# Patient Record
Sex: FEMALE | Race: BLACK OR AFRICAN AMERICAN | NOT HISPANIC OR LATINO | Employment: FULL TIME | URBAN - METROPOLITAN AREA
[De-identification: names, ages, dates, MRNs, and addresses within clinical notes are randomized per-mention and may not be internally consistent; named-entity substitution may affect disease eponyms.]

---

## 2017-04-14 ENCOUNTER — OFFICE VISIT (OUTPATIENT)
Dept: FAMILY MEDICINE | Facility: CLINIC | Age: 42
End: 2017-04-14
Payer: COMMERCIAL

## 2017-04-14 VITALS
TEMPERATURE: 98.3 F | OXYGEN SATURATION: 100 % | HEART RATE: 69 BPM | BODY MASS INDEX: 30.99 KG/M2 | SYSTOLIC BLOOD PRESSURE: 125 MMHG | DIASTOLIC BLOOD PRESSURE: 75 MMHG | WEIGHT: 186 LBS | HEIGHT: 65 IN

## 2017-04-14 DIAGNOSIS — Z11.3 SCREEN FOR STD (SEXUALLY TRANSMITTED DISEASE): ICD-10-CM

## 2017-04-14 DIAGNOSIS — Z00.00 ROUTINE GENERAL MEDICAL EXAMINATION AT A HEALTH CARE FACILITY: Primary | ICD-10-CM

## 2017-04-14 LAB
CHOLEST SERPL-MCNC: 177 MG/DL
GLUCOSE SERPL-MCNC: 101 MG/DL (ref 70–99)
HDLC SERPL-MCNC: 56 MG/DL
LDLC SERPL CALC-MCNC: 96 MG/DL
NONHDLC SERPL-MCNC: 121 MG/DL
TRIGL SERPL-MCNC: 125 MG/DL

## 2017-04-14 PROCEDURE — 86803 HEPATITIS C AB TEST: CPT | Performed by: PHYSICIAN ASSISTANT

## 2017-04-14 PROCEDURE — 87389 HIV-1 AG W/HIV-1&-2 AB AG IA: CPT | Performed by: PHYSICIAN ASSISTANT

## 2017-04-14 PROCEDURE — 99395 PREV VISIT EST AGE 18-39: CPT | Performed by: PHYSICIAN ASSISTANT

## 2017-04-14 PROCEDURE — 86709 HEPATITIS A IGM ANTIBODY: CPT | Performed by: PHYSICIAN ASSISTANT

## 2017-04-14 PROCEDURE — 82947 ASSAY GLUCOSE BLOOD QUANT: CPT | Performed by: PHYSICIAN ASSISTANT

## 2017-04-14 PROCEDURE — 36415 COLL VENOUS BLD VENIPUNCTURE: CPT | Performed by: PHYSICIAN ASSISTANT

## 2017-04-14 PROCEDURE — 87340 HEPATITIS B SURFACE AG IA: CPT | Performed by: PHYSICIAN ASSISTANT

## 2017-04-14 PROCEDURE — 86780 TREPONEMA PALLIDUM: CPT | Performed by: PHYSICIAN ASSISTANT

## 2017-04-14 PROCEDURE — 80061 LIPID PANEL: CPT | Performed by: PHYSICIAN ASSISTANT

## 2017-04-14 NOTE — NURSING NOTE
"Chief Complaint   Patient presents with     Physical     fasting       Initial /75 (BP Location: Left arm, Patient Position: Chair, Cuff Size: Adult Regular)  Pulse 69  Temp 98.3  F (36.8  C) (Oral)  Ht 5' 5\" (1.651 m)  Wt 186 lb (84.4 kg)  SpO2 100%  BMI 30.95 kg/m2 Estimated body mass index is 30.95 kg/(m^2) as calculated from the following:    Height as of this encounter: 5' 5\" (1.651 m).    Weight as of this encounter: 186 lb (84.4 kg).  Medication Reconciliation: complete       Rajni Sánchez MA  8:24 AM 4/14/2017    "

## 2017-04-14 NOTE — PATIENT INSTRUCTIONS
Preventive Health Recommendations  Female Ages 26 - 39  Yearly exam:   See your health care provider every year in order to    Review health changes.     Discuss preventive care.      Review your medicines if you your doctor has prescribed any.    Until age 30: Get a Pap test every three years (more often if you have had an abnormal result).    After age 30: Talk to your doctor about whether you should have a Pap test every 3 years or have a Pap test with HPV screening every 5 years.   You do not need a Pap test if your uterus was removed (hysterectomy) and you have not had cancer.  You should be tested each year for STDs (sexually transmitted diseases), if you're at risk.   Talk to your provider about how often to have your cholesterol checked.  If you are at risk for diabetes, you should have a diabetes test (fasting glucose).  Shots: Get a flu shot each year. Get a tetanus shot every 10 years.   Nutrition:     Eat at least 5 servings of fruits and vegetables each day.    Eat whole-grain bread, whole-wheat pasta and brown rice instead of white grains and rice.    Talk to your provider about Calcium and Vitamin D.     Lifestyle    Exercise at least 150 minutes a week (30 minutes a day, 5 days of the week). This will help you control your weight and prevent disease.    Limit alcohol to one drink per day.    No smoking.     Wear sunscreen to prevent skin cancer.    See your dentist every six months for an exam and cleaning.        Based on your medical history and these are the current health maintenance or preventive care services that you are due for (some may have been done at this visit)  Health Maintenance Due   Topic Date Due     PAP SCREENING Q3 YR (SYSTEM ASSIGNED)  07/22/1998         At [facility], we strive to deliver an exceptional experience to you, every time we see you.    If you receive a survey in the mail, please send us back your thoughts. We really do value your  feedback.    Your care team's suggested websites for health information:  Www.fairview.org : Up to date and easily searchable information on multiple topics.  Www.medlineplus.gov : medication info, interactive tutorials, watch real surgeries online  Www.familydoctor.org : good info from the Academy of Family Physicians  Www.cdc.gov : public health info, travel advisories, epidemics (H1N1)  Www.aap.org : children's health info, normal development, vaccinations  Www.health.[facility].[place].us : [place] dept of health, public health issues in [place], N1N1    How to contact your care team:   Team Ryann/Spirit [phone]         Pharmacy [phone]    Dr. Flowers, Tonie Bill PA-C, Dr. Noguera, Aditi Garcia APRN CNP, Adamaris Carrion PA-C, Dr. Grimes, and STEPHANIA Casillas CNP    Team RNs: Mariah & Desire      Clinic hours  M-Th 7 am-7 pm   Fri 7 am-5 pm.   Urgent care M-F 11 am-9 pm,   Sat/Sun 9 am-5 pm.  Pharmacy M-Th 8 am-8 pm Fri 8 am-6 pm  Sat/Sun 9 am-5 pm.     All password changes, disabled accounts, or ID changes in Swipely/MyHealth will be done by our Access Services Department.    If you need help with your account or password, call: [phone]. Clinic staff no longer has the ability to change passwords.

## 2017-04-14 NOTE — PROGRESS NOTES
SUBJECTIVE:     CC: Julianna Dickey is an 41 year old woman who presents for preventive health visit.     Healthy Habits:    Do you get at least three servings of calcium containing foods daily (dairy, green leafy vegetables, etc.)? no, taking calcium and/or vitamin D supplement: yes - when remembered    Amount of exercise or daily activities, outside of work: 0 day(s) per week    Problems taking medications regularly not applicable    Medication side effects: No    Have you had an eye exam in the past two years? no    Do you see a dentist twice per year? yes    Do you have sleep apnea, excessive snoring or daytime drowsiness?no    Pap smear done on this date: 2015 (approximately), by this group: [facility], results were normal.     Was on birth control pills off and on in the past.        She continues to have right shoulder pain (which has been treated at an outside clinic, she had an MRI and has been doing physical therapy and a cortisone shot).     Today's PHQ-2 Score:   PHQ-2 ( 1999 Pfizer) 4/14/2017   Q1: Little interest or pleasure in doing things 1   Q2: Feeling down, depressed or hopeless 0   PHQ-2 Score 1       Abuse: Current or Past(Physical, Sexual or Emotional)- No  Do you feel safe in your environment - Yes    Social History   Substance Use Topics     Smoking status: Never Smoker     Smokeless tobacco: Not on file     Alcohol use Yes      Comment: social, occasionally     The patient does not drink >3 drinks per day nor >7 drinks per week.    No results for input(s): CHOL, HDL, LDL, TRIG, CHOLHDLRATIO, NHDL in the last 88034 hours.    Reviewed orders with patient.  Reviewed health maintenance and updated orders accordingly - Yes    Mammo Decision Support:  Patient under age 50, mutual decision reflected in health maintenance.      Pertinent mammograms are reviewed under the imaging tab.  History of abnormal Pap smear: NO - age 30- 65 PAP every 3 years recommended    Reviewed and updated as needed this  "visit by clinical staff  Tobacco  Allergies         Reviewed and updated as needed this visit by Provider            ROS:  C: NEGATIVE for fever, chills, change in weight  I: NEGATIVE for worrisome rashes, moles or lesions  E: NEGATIVE for vision changes or irritation  ENT: NEGATIVE for ear, mouth and throat problems  R: NEGATIVE for significant cough or SOB  B: NEGATIVE for masses, tenderness or discharge  CV: NEGATIVE for chest pain, palpitations or peripheral edema  GI: NEGATIVE for nausea, abdominal pain, heartburn, or change in bowel habits  : NEGATIVE for unusual urinary or vaginal symptoms. Periods are regular.  M: NEGATIVE for significant arthralgias or myalgia  N: NEGATIVE for weakness, dizziness or paresthesias  P: NEGATIVE for changes in mood or affect    Problem list, Medication list, Allergies, and Medical/Social/Surgical histories reviewed in [facility] and updated as appropriate.  Labs reviewed in EPIC  BP Readings from Last 3 Encounters:   04/14/17 125/75   09/18/15 123/79    Wt Readings from Last 3 Encounters:   04/14/17 186 lb (84.4 kg)   09/18/15 176 lb (79.8 kg)                  OBJECTIVE:     /75 (BP Location: Left arm, Patient Position: Chair, Cuff Size: Adult Regular)  Pulse 69  Temp 98.3  F (36.8  C) (Oral)  Ht 5' 5\" (1.651 m)  Wt 186 lb (84.4 kg)  SpO2 100%  BMI 30.95 kg/m2  EXAM:  GENERAL: healthy, alert and no distress  EYES: Eyes grossly normal to inspection, PERRL and conjunctivae and sclerae normal  HENT: ear canals and TM's normal, nose and mouth without ulcers or lesions  NECK: no adenopathy, no asymmetry, masses, or scars and thyroid normal to palpation  RESP: lungs clear to auscultation - no rales, rhonchi or wheezes  BREAST: normal without masses, tenderness or nipple discharge and no palpable axillary masses or adenopathy  CV: regular rate and rhythm, normal S1 S2, no S3 or S4, no murmur, click or rub, no peripheral edema and peripheral pulses strong  ABDOMEN: soft, " "nontender, no hepatosplenomegaly, no masses and bowel sounds normal  MS: no gross musculoskeletal defects noted, no edema  SKIN: no suspicious lesions or rashes  NEURO: Normal strength and tone, mentation intact and speech normal  PSYCH: mentation appears normal, affect normal/bright    ASSESSMENT/PLAN:     1. Routine general medical examination at a health care facility       HEALTH CARE MAINTENANCE              Reviewed USPTF recommendations and anticipatory guidance.              See orders.   I discussed in detail with Julianna Dickey the importance of cervical cancer screenings through pap smears, will repeat pap every 3 year(s).         F/u with physician she has seen previously for her shoulder pain for next steps of treatment.     - Lipid panel reflex to direct LDL  - Glucose    2. Screen for STD (sexually transmitted disease)  Patient requests screening, no symptoms.   - Anti Treponema  - NEISSERIA GONORRHOEA PCR; Future  - CHLAMYDIA TRACHOMATIS PCR; Future  - Hepatitis A antibody IgM  - Hepatitis B surface antigen  - Hepatitis C antibody  - HIV Antigen Antibody Combo    COUNSELING:   Reviewed preventive health counseling, as reflected in patient instructions       Regular exercise       Healthy diet/nutrition       Safe sex practices/STD prevention         reports that she has never smoked. She does not have any smokeless tobacco history on file.    Estimated body mass index is 30.95 kg/(m^2) as calculated from the following:    Height as of this encounter: 5' 5\" (1.651 m).    Weight as of this encounter: 186 lb (84.4 kg).   Weight management plan: Discussed healthy diet and exercise guidelines and patient will follow up in 12 months in clinic to re-evaluate.    Counseling Resources:  ATP IV Guidelines  Pooled Cohorts Equation Calculator  Breast Cancer Risk Calculator  FRAX Risk Assessment  ICSI Preventive Guidelines  Dietary Guidelines for Americans, 2010  USDA's MyPlate  ASA Prophylaxis  Lung CA " Screening    Adamaris Carrion PA-C  [facility]

## 2017-04-14 NOTE — MR AVS SNAPSHOT
After Visit Summary   4/14/2017    Julianna Dickey    MRN: [id number]           Patient Information     Date Of Birth          1975        Visit Information        Provider Department      4/14/2017 8:00 AM Adamaris Carrion PA-C [facility]        Today's Diagnoses     Routine general medical examination at a health care facility    -  1    Screen for STD (sexually transmitted disease)          Care Instructions      Preventive Health Recommendations  Female Ages 26 - 39  Yearly exam:   See your health care provider every year in order to    Review health changes.     Discuss preventive care.      Review your medicines if you your doctor has prescribed any.    Until age 30: Get a Pap test every three years (more often if you have had an abnormal result).    After age 30: Talk to your doctor about whether you should have a Pap test every 3 years or have a Pap test with HPV screening every 5 years.   You do not need a Pap test if your uterus was removed (hysterectomy) and you have not had cancer.  You should be tested each year for STDs (sexually transmitted diseases), if you're at risk.   Talk to your provider about how often to have your cholesterol checked.  If you are at risk for diabetes, you should have a diabetes test (fasting glucose).  Shots: Get a flu shot each year. Get a tetanus shot every 10 years.   Nutrition:     Eat at least 5 servings of fruits and vegetables each day.    Eat whole-grain bread, whole-wheat pasta and brown rice instead of white grains and rice.    Talk to your provider about Calcium and Vitamin D.     Lifestyle    Exercise at least 150 minutes a week (30 minutes a day, 5 days of the week). This will help you control your weight and prevent disease.    Limit alcohol to one drink per day.    No smoking.     Wear sunscreen to prevent skin cancer.    See your dentist every six months for an exam and cleaning.        Based on your medical history and these  are the current health maintenance or preventive care services that you are due for (some may have been done at this visit)  Health Maintenance Due   Topic Date Due     PAP SCREENING Q3 YR (SYSTEM ASSIGNED)  07/22/1998         At [facility], we strive to deliver an exceptional experience to you, every time we see you.    If you receive a survey in the mail, please send us back your thoughts. We really do value your feedback.    Your care team's suggested websites for health information:  Www.Anexon.org : Up to date and easily searchable information on multiple topics.  Www.medlineplus.gov : medication info, interactive tutorials, watch real surgeries online  Www.familydoctor.org : good info from the Academy of Family Physicians  Www.cdc.gov : public health info, travel advisories, epidemics (H1N1)  Www.aap.org : children's health info, normal development, vaccinations  Www.health.[facility].[place].us : [place] dept of health, public health issues in [place], N1N1    How to contact your care team:   Team Ryann/Spirit [phone]         Pharmacy [phone]    Dr. Flowers, Tonie Bill PA-C, Dr. Noguera, Aditi CAT CNP, Adamaris Carrion PA-C, Dr. Grimes, and STEPHANIA Casillas CNP    Team RNs: Mariah & Desire      Clinic hours  M-Th 7 am-7 pm   Fri 7 am-5 pm.   Urgent care M-F 11 am-9 pm,   Sat/Sun 9 am-5 pm.  Pharmacy M-Th 8 am-8 pm Fri 8 am-6 pm  Sat/Sun 9 am-5 pm.     All password changes, disabled accounts, or ID changes in Flavourly/MyHealth will be done by our Access Services Department.    If you need help with your account or password, call: [phone]. Clinic staff no longer has the ability to change passwords.           Follow-ups after your visit        Your next 10 appointments already scheduled     Apr 19, 2017  3:30 PM CDT   [facility]    [address] [place] [address]  "[phone]   [phone]           Bring a current list of meds and any records pertaining to this visit.  For Physicals, please bring immunization records and any forms needing to be filled out.  Please arrive 10 minutes early to complete paperwork.              Future tests that were ordered for you today     Open Future Orders        Priority Expected Expires Ordered    NEISSERIA GONORRHOEA PCR Routine 2017    CHLAMYDIA TRACHOMATIS PCR Routine 2017            Who to contact     If you have questions or need follow up information about today's clinic visit or your schedule please contact [facility] directly at [phone].  Normal or non-critical lab and imaging results will be communicated to you by Unowhyhart, letter or phone within 4 business days after the clinic has received the results. If you do not hear from us within 7 days, please contact the clinic through Unowhyhart or phone. If you have a critical or abnormal lab result, we will notify you by phone as soon as possible.  Submit refill requests through [facility] or call your pharmacy and they will forward the refill request to us. Please allow 3 business days for your refill to be completed.          Additional Information About Your Visit        Unowhyhart Information     [facility] lets you send messages to your doctor, view your test results, renew your prescriptions, schedule appointments and more. To sign up, go to [email or web address] . Click on \"Log in\" on the left side of the screen, which will take you to the Welcome page. Then click on \"Sign up Now\" on the right side of the page.     You will be asked to enter the access code listed below, as well as some personal information. Please follow the directions to create your username and password.     Your access code is: [email or web address]  Expires: 2017  8:54 AM     Your access code will  in 90 days. If you need help or a new code, " "please call your [facility] clinic or [phone].        Care EveryWhere ID     This is your Care EveryWhere ID. This could be used by other organizations to access your [facility] medical records  [id number]        Your Vitals Were     Pulse Temperature Height Pulse Oximetry BMI (Body Mass Index)       69 98.3  F (36.8  C) (Oral) 5' 5\" (1.651 m) 100% 30.95 kg/m2        Blood Pressure from Last 3 Encounters:   04/14/17 125/75   09/18/15 123/79    Weight from Last 3 Encounters:   04/14/17 186 lb (84.4 kg)   09/18/15 176 lb (79.8 kg)              We Performed the Following     Anti Treponema     Glucose     Hepatitis A antibody IgM     Hepatitis B surface antigen     Hepatitis C antibody     HIV Antigen Antibody Combo     Lipid panel reflex to direct LDL        Primary Care Provider    None Specified       No primary provider on file.        Thank you!     Thank you for choosing [facility]  for your care. Our goal is always to provide you with excellent care. Hearing back from our patients is one way we can continue to improve our services. Please take a few minutes to complete the written survey that you may receive in the mail after your visit with us. Thank you!             Your Updated Medication List - Protect others around you: Learn how to safely use, store and throw away your medicines at www.disposemymeds.org.      Notice  As of 4/14/2017  8:54 AM    You have not been prescribed any medications.      "

## 2017-04-14 NOTE — Clinical Note
Please abstract the following data from this visit with this patient into the appropriate field in Epic:  Pap smear done on this date: 10/30/2015 (approximately), by this group: [facility], results were normal.

## 2017-04-15 LAB — T PALLIDUM IGG+IGM SER QL: NEGATIVE

## 2017-04-17 LAB
HAV IGM SERPL QL IA: NORMAL
HBV SURFACE AG SERPL QL IA: NONREACTIVE
HCV AB SERPL QL IA: NORMAL
HIV 1+2 AB+HIV1 P24 AG SERPL QL IA: NORMAL

## 2017-04-19 ENCOUNTER — OFFICE VISIT (OUTPATIENT)
Dept: OBGYN | Facility: CLINIC | Age: 42
End: 2017-04-19
Payer: COMMERCIAL

## 2017-04-19 VITALS
SYSTOLIC BLOOD PRESSURE: 132 MMHG | WEIGHT: 187 LBS | BODY MASS INDEX: 31.16 KG/M2 | HEART RATE: 81 BPM | HEIGHT: 65 IN | DIASTOLIC BLOOD PRESSURE: 80 MMHG

## 2017-04-19 DIAGNOSIS — R53.83 LOW ENERGY: ICD-10-CM

## 2017-04-19 DIAGNOSIS — N92.0 EXCESSIVE AND FREQUENT MENSTRUATION: ICD-10-CM

## 2017-04-19 DIAGNOSIS — D25.9 UTERINE LEIOMYOMA, UNSPECIFIED LOCATION: Primary | ICD-10-CM

## 2017-04-19 DIAGNOSIS — N94.6 DYSMENORRHEA: ICD-10-CM

## 2017-04-19 DIAGNOSIS — Z11.3 SCREEN FOR STD (SEXUALLY TRANSMITTED DISEASE): ICD-10-CM

## 2017-04-19 DIAGNOSIS — N83.202 CYST OF LEFT OVARY: ICD-10-CM

## 2017-04-19 LAB
ERYTHROCYTE [DISTWIDTH] IN BLOOD BY AUTOMATED COUNT: 14.5 % (ref 10–15)
HBA1C MFR BLD: 5.3 % (ref 4.3–6)
HCT VFR BLD AUTO: 36.7 % (ref 35–47)
HGB BLD-MCNC: 12.8 G/DL (ref 11.7–15.7)
MCH RBC QN AUTO: 26.6 PG (ref 26.5–33)
MCHC RBC AUTO-ENTMCNC: 34.9 G/DL (ref 31.5–36.5)
MCV RBC AUTO: 76 FL (ref 78–100)
PLATELET # BLD AUTO: 270 10E9/L (ref 150–450)
RBC # BLD AUTO: 4.82 10E12/L (ref 3.8–5.2)
TSH SERPL DL<=0.05 MIU/L-ACNC: 2.27 MU/L (ref 0.4–4)
WBC # BLD AUTO: 5.7 10E9/L (ref 4–11)

## 2017-04-19 PROCEDURE — 85027 COMPLETE CBC AUTOMATED: CPT | Performed by: OBSTETRICS & GYNECOLOGY

## 2017-04-19 PROCEDURE — 84443 ASSAY THYROID STIM HORMONE: CPT | Performed by: OBSTETRICS & GYNECOLOGY

## 2017-04-19 PROCEDURE — 87491 CHLMYD TRACH DNA AMP PROBE: CPT | Performed by: OBSTETRICS & GYNECOLOGY

## 2017-04-19 PROCEDURE — 99203 OFFICE O/P NEW LOW 30 MIN: CPT | Performed by: OBSTETRICS & GYNECOLOGY

## 2017-04-19 PROCEDURE — 87591 N.GONORRHOEAE DNA AMP PROB: CPT | Performed by: OBSTETRICS & GYNECOLOGY

## 2017-04-19 PROCEDURE — 36415 COLL VENOUS BLD VENIPUNCTURE: CPT | Performed by: OBSTETRICS & GYNECOLOGY

## 2017-04-19 PROCEDURE — 83036 HEMOGLOBIN GLYCOSYLATED A1C: CPT | Performed by: OBSTETRICS & GYNECOLOGY

## 2017-04-19 NOTE — MR AVS SNAPSHOT
After Visit Summary   4/19/2017    Julianna Dickey    MRN: [id number]           Patient Information     Date Of Birth          1975        Visit Information        Provider Department      4/19/2017 3:30 PM Gigi Poole MD [facility]        Care Instructions                                                        If you have any questions regarding your visit, Please contact your care team.     [facility] Access Services: [phone]    [facility] HOURS TELEPHONE NUMBER   Gigi Poole M.D.      Griselda-    Giuliano Stafford-ALISHA Camp-Medical Assistant   Monday-[place]  8:00a.m-4:45 p.m  Wednesday-[place] 8:00a.m-4:45 p.m.  Thursday-[place]  8:00a.m-4:45 p.m.  Friday-[place]  8:00a.m-4:45 p.m. [facility]  [address] [address] [place] [address] [place] [address]  [phone] ask [facility]  [phone] Fax  Imaging [phone]    [facility]  [address] [place] [address] [address].  [place], [place] [address] [phone]    [facility]  [address] [place] [address]  [phone] [facility]  [phone] Fax  Imaging [phone]     Urgent Care locations:    [place]        [place] Monday-Friday  5 pm - 9 pm  Saturday and Sunday   9 am - 5 pm    Monday-Friday   11 am - 9 pm  Saturday and Sunday   9 am - 5 pm   [phone]       If you need a medication refill, please contact your pharmacy. Please allow 3 business days for your refill to be completed.  As always, Thank you for trusting us with your healthcare needs!          Follow-ups after your visit        Who to contact     If you have questions or need follow up information about today's clinic visit or your schedule please contact [facility] directly at [phone].  Normal or non-critical lab and imaging results will be communicated to you by [employer]hart,  "letter or phone within 4 business days after the clinic has received the results. If you do not hear from us within 7 days, please contact the clinic through RestoMesto or phone. If you have a critical or abnormal lab result, we will notify you by phone as soon as possible.  Submit refill requests through [employer] or call your pharmacy and they will forward the refill request to us. Please allow 3 business days for your refill to be completed.          Additional Information About Your Visit        [facility] Information     [employer] gives you secure access to your electronic health record. If you see a primary care provider, you can also send messages to your care team and make appointments. If you have questions, please call your primary care clinic.  If you do not have a primary care provider, please call [phone] and they will assist you.        Care EveryWhere ID     This is your Care EveryWhere ID. This could be used by other organizations to access your [facility] medical records  [id number]        Your Vitals Were     Pulse Height Last Period Breastfeeding? BMI (Body Mass Index)       81 5' 5\" (1.651 m) 04/14/2017 No 31.12 kg/m2        Blood Pressure from Last 3 Encounters:   04/19/17 132/80   04/14/17 125/75   09/18/15 123/79    Weight from Last 3 Encounters:   04/19/17 187 lb (84.8 kg)   04/14/17 186 lb (84.4 kg)   09/18/15 176 lb (79.8 kg)              Today, you had the following     No orders found for display       Primary Care Provider    None Specified       No primary provider on file.        Thank you!     Thank you for choosing [facility]  for your care. Our goal is always to provide you with excellent care. Hearing back from our patients is one way we can continue to improve our services. Please take a few minutes to complete the written survey that you may receive in the mail after your visit with us. Thank you!             Your Updated Medication List - Protect others around " you: Learn how to safely use, store and throw away your medicines at www.disposemymeds.org.      Notice  As of 4/19/2017  3:35 PM    You have not been prescribed any medications.

## 2017-04-19 NOTE — PATIENT INSTRUCTIONS
If you have any questions regarding your visit, Please contact your care team.     [facility] Access Services: [phone]    [facility] CLINIC HOURS TELEPHONE NUMBER   NEIL Collins-    Giuliano Stafford-ALISHA Camp-Medical Assistant   Monday-[place]  8:00a.m-4:45 p.m  Wednesday-[place] 8:00a.m-4:45 p.m.  Thursday-[place]  8:00a.m-4:45 p.m.  Friday-[place]  8:00a.m-4:45 p.m. [facility]  [address]  [place], [place] [address] [phone] ask [facility]  [phone] Fax  Imaging [phone]    [facility]  [address] [place] [address] Dr.  [place], [place] [address] [phone]    [facility]  [address] [place] [address]  [phone] ask [facility]  [phone] Fax  Imaging [phone]     Urgent Care locations:    [place]        [place] Monday-Friday  5 pm - 9 pm  Saturday and Sunday   9 am - 5 pm    Monday-Friday   11 am - 9 pm  Saturday and Sunday   9 am - 5 pm   [phone]       If you need a medication refill, please contact your pharmacy. Please allow 3 business days for your refill to be completed.  As always, Thank you for trusting us with your healthcare needs!

## 2017-04-19 NOTE — NURSING NOTE
"Chief Complaint   Patient presents with     Consult     Discuss US done at  8/19/16 Fibroids/cysts       Initial /80 (BP Location: Left arm, Patient Position: Chair, Cuff Size: Adult Regular)  Pulse 81  Ht 5' 5\" (1.651 m)  Wt 187 lb (84.8 kg)  LMP 04/14/2017  Breastfeeding? No  BMI 31.12 kg/m2 Estimated body mass index is 31.12 kg/(m^2) as calculated from the following:    Height as of this encounter: 5' 5\" (1.651 m).    Weight as of this encounter: 187 lb (84.8 kg).  Medication Reconciliation: complete   Zoë Ramirez CMA      "

## 2017-04-20 LAB
C TRACH DNA SPEC QL NAA+PROBE: NORMAL
N GONORRHOEA DNA SPEC QL NAA+PROBE: NORMAL
SPECIMEN SOURCE: NORMAL
SPECIMEN SOURCE: NORMAL

## 2017-04-23 NOTE — PROGRESS NOTES
OB-GYN Problem-Oriented Visit or Consultation      Julianna Dickey is a 41 year old year old P 2 who presents with a chief complaint of follow-up to pelvic u/s done at  08/2016.  Referred by self.  Patient's last menstrual period was 04/14/2017.    HPI:     Menses q 14-42 days x 2-14 days. Dysmenorrhea. Heavy flow. Right sided pain at ovulation. Pelvic u/s last yr reviewed from Care Everywhere: 4 cm fibroid, 2 cm complex left ov cyst. No steady partner. Plans future pregnancy. Contraceptive method is spermicidal film. Dyspareunia over past 1 yr. Also notes poor appetite and low energy. Weight gain with DepoP.  Tried Mirena and Nuvaring. Prior OC use. No VTE.     Past medical, obstetrical, surgical, family and social history reviewed and as noted or updated in chart.     Allergies, meds and supplements are as noted or updated in chart.      ROS:   Systems reviewed include:  constitutional, cardiac, respiratory, gastrointestinal, genitourinary, psychological, hematologic/lymphatic and endocrine.    These systems were negative for significant symptoms except for the following additional: none; see HPI.    EXAM:  VS as noted.   Exam not repeated at this time. Had recent gen exam.     Assessment:   Encounter Diagnoses   Name Primary?     Uterine leiomyoma, unspecified location Yes     Cyst of left ovary      Dysmenorrhea      Excessive and frequent menstruation      Screen for STD (sexually transmitted disease)      Low energy      Plan and Recommendations: I reviewed the condition, causes, differential diagnosis, prognosis, evaluation and management considerations and options.  Questions answered and information given.  See orders.  Check pelvic u/s here and then consider another OC.   Total encounter time= 30min. Direct counseling, education and care coordination time with the patient present= 20min.     A/P:  Julianna was seen today for consult.    Diagnoses and all orders for this visit:    Uterine leiomyoma, unspecified  location  -     US Pelvic Complete with Transvaginal; Future    Cyst of left ovary  -     US Pelvic Complete with Transvaginal; Future    Dysmenorrhea  -     US Pelvic Complete with Transvaginal; Future    Excessive and frequent menstruation  -     US Pelvic Complete with Transvaginal; Future    Screen for STD (sexually transmitted disease)  -     CHLAMYDIA TRACHOMATIS PCR  -     NEISSERIA GONORRHOEA PCR    Low energy  -     CBC with platelets  -     TSH  -     Hemoglobin A1c        Gigi Poole MD

## 2017-05-01 ENCOUNTER — RADIANT APPOINTMENT (OUTPATIENT)
Dept: ULTRASOUND IMAGING | Facility: CLINIC | Age: 42
End: 2017-05-01
Attending: OBSTETRICS & GYNECOLOGY
Payer: COMMERCIAL

## 2017-05-01 DIAGNOSIS — N94.6 DYSMENORRHEA: ICD-10-CM

## 2017-05-01 DIAGNOSIS — N92.0 EXCESSIVE AND FREQUENT MENSTRUATION: ICD-10-CM

## 2017-05-01 DIAGNOSIS — N83.202 CYST OF LEFT OVARY: ICD-10-CM

## 2017-05-01 DIAGNOSIS — D25.9 UTERINE LEIOMYOMA, UNSPECIFIED LOCATION: ICD-10-CM

## 2017-05-01 PROCEDURE — 76856 US EXAM PELVIC COMPLETE: CPT

## 2017-05-01 PROCEDURE — 76830 TRANSVAGINAL US NON-OB: CPT

## 2017-06-21 ENCOUNTER — OFFICE VISIT (OUTPATIENT)
Dept: OBGYN | Facility: CLINIC | Age: 42
End: 2017-06-21
Payer: COMMERCIAL

## 2017-06-21 VITALS
DIASTOLIC BLOOD PRESSURE: 76 MMHG | WEIGHT: 188 LBS | HEART RATE: 95 BPM | BODY MASS INDEX: 31.32 KG/M2 | SYSTOLIC BLOOD PRESSURE: 129 MMHG | TEMPERATURE: 98.5 F | OXYGEN SATURATION: 100 % | HEIGHT: 65 IN

## 2017-06-21 DIAGNOSIS — N92.0 EXCESSIVE OR FREQUENT MENSTRUATION: Primary | ICD-10-CM

## 2017-06-21 DIAGNOSIS — D25.2 SUBSEROUS LEIOMYOMA OF UTERUS: ICD-10-CM

## 2017-06-21 PROCEDURE — 99214 OFFICE O/P EST MOD 30 MIN: CPT | Performed by: OBSTETRICS & GYNECOLOGY

## 2017-06-21 RX ORDER — LEVONORGESTREL AND ETHINYL ESTRADIOL 0.15-0.03
1 KIT ORAL DAILY
Qty: 84 TABLET | Refills: 1 | Status: SHIPPED | OUTPATIENT
Start: 2017-06-21 | End: 2017-12-12

## 2017-06-21 ASSESSMENT — ANXIETY QUESTIONNAIRES
3. WORRYING TOO MUCH ABOUT DIFFERENT THINGS: SEVERAL DAYS
GAD7 TOTAL SCORE: 3
6. BECOMING EASILY ANNOYED OR IRRITABLE: SEVERAL DAYS
2. NOT BEING ABLE TO STOP OR CONTROL WORRYING: SEVERAL DAYS
5. BEING SO RESTLESS THAT IT IS HARD TO SIT STILL: NOT AT ALL
IF YOU CHECKED OFF ANY PROBLEMS ON THIS QUESTIONNAIRE, HOW DIFFICULT HAVE THESE PROBLEMS MADE IT FOR YOU TO DO YOUR WORK, TAKE CARE OF THINGS AT HOME, OR GET ALONG WITH OTHER PEOPLE: NOT DIFFICULT AT ALL
7. FEELING AFRAID AS IF SOMETHING AWFUL MIGHT HAPPEN: NOT AT ALL
1. FEELING NERVOUS, ANXIOUS, OR ON EDGE: NOT AT ALL

## 2017-06-21 ASSESSMENT — PATIENT HEALTH QUESTIONNAIRE - PHQ9: 5. POOR APPETITE OR OVEREATING: NOT AT ALL

## 2017-06-21 ASSESSMENT — PAIN SCALES - GENERAL: PAINLEVEL: NO PAIN (0)

## 2017-06-21 NOTE — NURSING NOTE
"Chief Complaint   Patient presents with     Consult     Fibroids & Uterine Cyst       Initial /76 (BP Location: Left arm, Patient Position: Chair, Cuff Size: Adult Large)  Pulse 95  Temp 98.5  F (36.9  C) (Oral)  Ht 5' 5\" (1.651 m)  Wt 188 lb (85.3 kg)  SpO2 100%  BMI 31.28 kg/m2 Estimated body mass index is 31.28 kg/(m^2) as calculated from the following:    Height as of this encounter: 5' 5\" (1.651 m).    Weight as of this encounter: 188 lb (85.3 kg).  Medication Reconciliation: complete   JULIO Boudreaux MA      "

## 2017-06-21 NOTE — MR AVS SNAPSHOT
"              After Visit Summary   6/21/2017    Julianna Dickey    MRN: [id number]           Patient Information     Date Of Birth          1975        Visit Information        Provider Department      6/21/2017 3:00 PM Gigi Poole MD [facility]         Follow-ups after your visit        Who to contact     If you have questions or need follow up information about today's clinic visit or your schedule please contact [facility] directly at [phone].  Normal or non-critical lab and imaging results will be communicated to you by MyChart, letter or phone within 4 business days after the clinic has received the results. If you do not hear from us within 7 days, please contact the clinic through [employer] or phone. If you have a critical or abnormal lab result, we will notify you by phone as soon as possible.  Submit refill requests through [employer] or call your pharmacy and they will forward the refill request to us. Please allow 3 business days for your refill to be completed.          Additional Information About Your Visit        MyChart Information     [employer] gives you secure access to your electronic health record. If you see a primary care provider, you can also send messages to your care team and make appointments. If you have questions, please call your primary care clinic.  If you do not have a primary care provider, please call [phone] and they will assist you.        Care EveryWhere ID     This is your Care EveryWhere ID. This could be used by other organizations to access your [place] medical records  [id number]        Your Vitals Were     Pulse Temperature Height Pulse Oximetry BMI (Body Mass Index)       95 98.5  F (36.9  C) (Oral) 5' 5\" (1.651 m) 100% 31.28 kg/m2        Blood Pressure from Last 3 Encounters:   06/21/17 129/76   04/19/17 132/80   04/14/17 125/75    Weight from Last 3 Encounters:   06/21/17 188 lb (85.3 kg)   04/19/17 187 lb (84.8 kg) "   04/14/17 186 lb (84.4 kg)              Today, you had the following     No orders found for display       Primary Care Provider    None Specified       No primary provider on file.        Equal Access to Services     GAYATRI TIMMONS : Kamilah Castro, william chappell, guillemigue zelayaharshadmelinda bryantgayatri, waxyovany yusra lindseynydia bryantkate wheatraquel cotton. So [facility] [phone].    ATENCIÓN: Si habla español, tiene a deluna disposición servicios gratuitos de asistencia lingüística. Llame al [phone].    We comply with applicable federal civil rights laws and [place] laws. We do not discriminate on the basis of race, color, national origin, age, disability sex, sexual orientation or gender identity.            Thank you!     Thank you for choosing [facility]  for your care. Our goal is always to provide you with excellent care. Hearing back from our patients is one way we can continue to improve our services. Please take a few minutes to complete the written survey that you may receive in the mail after your visit with us. Thank you!             Your Updated Medication List - Protect others around you: Learn how to safely use, store and throw away your medicines at www.disposemymeds.org.      Notice  As of 6/21/2017  3:20 PM    You have not been prescribed any medications.

## 2017-06-22 ASSESSMENT — ANXIETY QUESTIONNAIRES: GAD7 TOTAL SCORE: 3

## 2017-06-22 ASSESSMENT — PATIENT HEALTH QUESTIONNAIRE - PHQ9: SUM OF ALL RESPONSES TO PHQ QUESTIONS 1-9: 6

## 2017-06-23 NOTE — PROGRESS NOTES
Julianna Dickey is a 41 year old year old who is here today for a recheck of dysmenorrhea and uterine fibroid.  See prior notes. Pelvic u/s including images reviewed.     Significant interval changes: none.  No signif signs, symptoms or concerns otherwise. LMP was same. Fibroid now 7 cm.     Past medical, obstetrical, surgical, family and social history reviewed and as noted or updated in chart.      Exam: not repeated.    A/P: Symptomatic uterus fibroid and small ovarian cyst. Chronic pelvic pain. Dysmenorrhea.   I reviewed the condition, causes, differential diagnosis, prognosis, evaluation and management considerations and options.  Questions answered and information given.  See orders.  Advise trying a new OC to improve menses. Plans to keep uterus and keep option of future pregnancy open. Lupron or UFE not advised. Alternatives of myomectomy or Lysteda also discussed.  Nordette prescription. Recheck progress and exam in 6 mo.   Medications and prescriptions given as noted.  I reviewed side effects, risks, benefits and instructions on proper use.  Total encounter time= 25min. Direct counseling, education and care coordination time with the patient present= 20min.     Julianna was seen today for consult.    Diagnoses and all orders for this visit:    Excessive or frequent menstruation  -     levonorgestrel-ethinyl estradiol (NORDETTE) 0.15-30 MG-MCG per tablet; Take 1 tablet by mouth daily    Subserous leiomyoma of uterus  -     levonorgestrel-ethinyl estradiol (NORDETTE) 0.15-30 MG-MCG per tablet; Take 1 tablet by mouth daily        Gigi Poole MD

## 2017-09-11 ENCOUNTER — OFFICE VISIT (OUTPATIENT)
Dept: OBGYN | Facility: CLINIC | Age: 42
End: 2017-09-11
Payer: COMMERCIAL

## 2017-09-11 VITALS
SYSTOLIC BLOOD PRESSURE: 125 MMHG | HEART RATE: 81 BPM | BODY MASS INDEX: 31.28 KG/M2 | DIASTOLIC BLOOD PRESSURE: 73 MMHG | WEIGHT: 188 LBS

## 2017-09-11 DIAGNOSIS — N94.6 DYSMENORRHEA: ICD-10-CM

## 2017-09-11 DIAGNOSIS — G89.29 CHRONIC PELVIC PAIN IN FEMALE: ICD-10-CM

## 2017-09-11 DIAGNOSIS — D25.2 SUBSEROUS LEIOMYOMA OF UTERUS: Primary | ICD-10-CM

## 2017-09-11 DIAGNOSIS — R10.2 CHRONIC PELVIC PAIN IN FEMALE: ICD-10-CM

## 2017-09-11 PROCEDURE — 99214 OFFICE O/P EST MOD 30 MIN: CPT | Performed by: OBSTETRICS & GYNECOLOGY

## 2017-09-11 NOTE — NURSING NOTE
"Chief Complaint   Patient presents with     RECHECK     Follow-up fibroids       Initial /73 (BP Location: Right arm, Patient Position: Chair, Cuff Size: Adult Regular)  Pulse 81  Wt 85.3 kg (188 lb)  LMP 08/29/2017  Breastfeeding? No  BMI 31.28 kg/m2 Estimated body mass index is 31.28 kg/(m^2) as calculated from the following:    Height as of 6/21/17: 1.651 m (5' 5\").    Weight as of this encounter: 85.3 kg (188 lb).  Medication Reconciliation: complete   Zoë Ramirez CMA      "

## 2017-09-11 NOTE — MR AVS SNAPSHOT
After Visit Summary   9/11/2017    Julianna Dickey    MRN: [id number]           Patient Information     Date Of Birth          1975        Visit Information        Provider Department      9/11/2017 2:30 PM Gigi Poole MD [facility]        Care Instructions                                                        If you have any questions regarding your visit, Please contact your care team.     [facility]: [phone]    [facility] CLINIC HOURS TELEPHONE NUMBER   Gigi Poole M.D.      Griselda-    Giuliano Stafford-ALISHA Camp-Medical Assistant   Monday-[place]  8:00a.m-4:45 p.m  Wednesday-[place] 8:00a.m-4:45 p.m.  Thursday-[place]  8:00a.m-4:45 p.m.  Friday-[place]  8:00a.m-4:45 p.m. [facility]  [address] [place], [place] [address].  [place], [place] [address] [phone] ask [facility]  [phone] Fax  Imaging [phone]    [facility]  [address] [place] [address] Dr.  [place], [place] [address] [phone]    [facility]  [address] [place], [place] [address] [phone] [facility]  [phone] Fax  Imaging [phone]     Urgent Care locations:    [facility] Monday-Friday  5 pm - 9 pm  Saturday and Sunday   9 am - 5 pm    Monday-Friday   11 am - 9 pm  Saturday and Sunday   9 am - 5 pm   [phone]       If you need a medication refill, please contact your pharmacy. Please allow 3 business days for your refill to be completed.  As always, Thank you for trusting us with your healthcare needs!            Follow-ups after your visit        Who to contact     If you have questions or need follow up information about today's clinic visit or your schedule please contact [facility] directly at [phone].  Normal or non-critical lab and imaging results will be communicated to you by MyChart, letter  or phone within 4 business days after the clinic has received the results. If you do not hear from us within 7 days, please contact the clinic through [employer] or phone. If you have a critical or abnormal lab result, we will notify you by phone as soon as possible.  Submit refill requests through [employer] or call your pharmacy and they will forward the refill request to us. Please allow 3 business days for your refill to be completed.          Additional Information About Your Visit        [employer] Information     [employer] gives you secure access to your electronic health record. If you see a primary care provider, you can also send messages to your care team and make appointments. If you have questions, please call your primary care clinic.  If you do not have a primary care provider, please call [phone] and they will assist you.        Care EveryWhere ID     This is your Care EveryWhere ID. This could be used by other organizations to access your [place] medical records  [id number]        Your Vitals Were     Pulse Last Period Breastfeeding? BMI (Body Mass Index)          81 08/29/2017 No 31.28 kg/m2         Blood Pressure from Last 3 Encounters:   09/11/17 125/73   06/21/17 129/76   04/19/17 132/80    Weight from Last 3 Encounters:   09/11/17 85.3 kg (188 lb)   06/21/17 85.3 kg (188 lb)   04/19/17 84.8 kg (187 lb)              Today, you had the following     No orders found for display       Primary Care Provider    None Specified       No primary provider on file.        Equal Access to Services     [facility] : Hadii titus Csatro, wataniada lualyssa, qaybta kaalmada molly, hilton danielle . So [facility] [phone].    ATENCIÓN: Si habla español, tiene a deluna disposición servicios gratuitos de asistencia lingüística. Llame al [phone].    We comply with applicable federal civil rights laws and [place] laws. We do not discriminate on the basis of race, color, national  origin, age, disability sex, sexual orientation or gender identity.            Thank you!     Thank you for choosing [facility]  for your care. Our goal is always to provide you with excellent care. Hearing back from our patients is one way we can continue to improve our services. Please take a few minutes to complete the written survey that you may receive in the mail after your visit with us. Thank you!             Your Updated Medication List - Protect others around you: Learn how to safely use, store and throw away your medicines at www.disposemymeds.org.          This list is accurate as of: 9/11/17  2:42 PM.  Always use your most recent med list.                   Brand Name Dispense Instructions for use Diagnosis    levonorgestrel-ethinyl estradiol 0.15-30 MG-MCG per tablet    ANALY    84 tablet    Take 1 tablet by mouth daily    Excessive or frequent menstruation, Subserous leiomyoma of uterus

## 2017-09-11 NOTE — PATIENT INSTRUCTIONS
If you have any questions regarding your visit, Please contact your care team.     [place] [facility] [facility]: [phone]    [facility] [address] CLINIC HOURS TELEPHONE NUMBER   NEIL Collins-    Giuliano Stafford-ALISHA Camp-Medical Assistant   Monday-[place]  8:00a.m-4:45 p.m  Wednesday-[place] 8:00a.m-4:45 p.m.  Thursday-[place]  8:00a.m-4:45 p.m.  Friday-[place]  8:00a.m-4:45 p.m. [facility]  [address]  [place], [place] [address] [phone] ask [facility]  [phone] Fax  Imaging [phone]    [facility]  [address] [place] [address] Dr.  [place], [place] [address] [phone]    [facility]  [address] [place] [address]  [phone] ask [facility]  [phone] Fax  Imaging [phone]     Urgent Care locations:    [place]        [place] Monday-Friday  5 pm - 9 pm  Saturday and Sunday   9 am - 5 pm    Monday-Friday   11 am - 9 pm  Saturday and Sunday   9 am - 5 pm   [phone]       If you need a medication refill, please contact your pharmacy. Please allow 3 business days for your refill to be completed.  As always, Thank you for trusting us with your healthcare needs!

## 2017-09-12 ENCOUNTER — DOCUMENTATION ONLY (OUTPATIENT)
Dept: OBGYN | Facility: CLINIC | Age: 42
End: 2017-09-12

## 2017-09-12 NOTE — PROGRESS NOTES
OB-GYN Problem-Oriented Visit or Consultation      Julianna Dickey is a 42 year old year old P 2 who presents with a chief complaint of dysmenorrhea.  Referred by self.  Patient's last menstrual period was 08/29/2017.    HPI:     See prior notes. Has known 7 cm subserosal fibroid on the right posterior uterus that is painful. Pain is in RLQ. Had small left ovarian cyst also that was likely functional. Menorrhagia is slightly better after two cycles on current OC. May not have yet achieved full effect of OC but improvement is limited so far. Has missed work due to pain. Dysmenorrhea remains disabling despite med treatment. Menses q 28-30 days x 8-10 days.  Contraceptive method is OC. Pap/HRHPV due in 1 yr.     Past medical, obstetrical, surgical, family and social history reviewed and as noted or updated in chart.     Allergies, meds and supplements are as noted or updated in chart.      ROS:   Systems reviewed include:  constitutional, gastrointestinal, genitourinary, musculoskeletal, integumentary, psychological, hematologic/lymphatic and endocrine.    These systems were negative for significant symptoms except for the following additional: none; see HPI.    EXAM:  VS as noted.   Exam not repeated at this time.      Assessment:   Encounter Diagnoses   Name Primary?     Subserous leiomyoma of uterus Yes     Chronic pelvic pain in female      Dysmenorrhea        Plan and Recommendations: I reviewed the condition, causes, differential diagnosis, prognosis, evaluation and management considerations and options.  Questions answered and information given. See orders. Advise and patient desires open myomectomy. Plan low transverse incision. I discussed the operation, indications, goals, general and specific risks, complications, alternatives and anticipated post-op course including success/failure rates, limitations and consequences.  Patient understands and desires to proceed.  Instructions reviewed. Second opinion or  subspecialty evaluation and management also offered.  Pre-anesthetic medical evaluation is to be arranged. Continue OC through another cycle until surgery.   Total encounter time= 25min. Direct counseling, education and care coordination time with the patient present= 20min.       A/P:  Julianna was seen today for recheck.    Diagnoses and all orders for this visit:    Subserous leiomyoma of uterus    Chronic pelvic pain in female    Dysmenorrhea        Gigi Poole MD

## 2017-09-12 NOTE — LETTER
[address] [address]  [phone]        September 14, 2017    Julianna Dickey                                                                                                            [address] [facility] [address]              Dear Julianna,    I have tried to reach you by phone to schedule surgery and your number is not in service.     Please call us at [phone] to update preferred contact information.      Thank you!        [place] [facility]

## 2017-09-12 NOTE — PROGRESS NOTES
Julianna Dickey  1975  Gender: Female  SSN: Not available  Phone: [phone] (home)     Pre-operative diagnosis: Symptomatic uterine leiomyoma, dysmenorrhea, chronic pelvic pain  Surgical procedure: Open myomectomy  Special equipment: None    Anesthesia: General  Position: Supine  Latex Allergy: No  VRE or MRSA or Other Precautions: None  Initiate Pre-op orders for above procedure: Yes as ordered in Epic.   Additional orders noted there also.   [facility] or [facility] Consent signed by surgeon: Not yet.  H&P: To be scheduled and will be faxed before surgery. Primary Care Provider will do H&P.  [facility] or [facility] Consent signed by patient: Not yet.  Sterilization or Hysterectomy consent signed in advance: Not applicable.   Date sterilization consent signed for Med Assistance pt: Not applicable.  Surgeon: Gigi Poole MD   PA assistant: No  Physician assistant: No  Hospital assistant requested: Yes    Electronically signed by: Gigi Poole MD       September 12, 2017          9:20 AM     Location for Surgery: [facility]  Date: open Tuesday.   Estimated Case Length: 2 hours.  Scheduled as: Admit following surgery  Other Special Preparations: None.  Pre-anesthetic medical evaluation is to be arranged with primary care provider.  Anticipate a post-op visit at 6 weeks will be needed. Pt may arrange this now or later.

## 2017-09-14 NOTE — PROGRESS NOTES
Attempted to contact patient. Number invalid. Letter sent to patient for request to contact clinic and update information to schedule surgery.    Zoë Ramirez, [facility]

## 2017-09-26 ENCOUNTER — TELEPHONE (OUTPATIENT)
Dept: OBGYN | Facility: CLINIC | Age: 42
End: 2017-09-26

## 2017-09-26 NOTE — PROGRESS NOTES
I called patient and have her scheduled on 10/31/2017 at 7:30am with a 6am arrival.  Pre-op is scheduled with Ekaterina Chapman on 10/18/2017.  Post op appointment is scheduled 12/12/2017.  Providers schedule was updated. Pre- certs done. Surgery Scheduling form emailed per protocol.  Darcy Hess RN    Surgery Scheduled.    Date of Surgery 10/31/2017 Time of Surgery 7:30am  Procedure: Open Myomectomy  Hospital/Surgical Facility: [facility]  Surgeon: Dr. Poole  Type of Anesthesia Anticipated: General  Pre-op: 10/18/2017 with Ekaterina Chapman at [facility]  2 week post op:  6 week post op 12[facility]12/2017 [facility] See below  Consent signed: NA  Hospital Stay yes        Surgery packet mailed to patient's home address.  Patient instructed NPO 12 hours prior to surgery, arrive 1 1/2 hours prior to surgery,  Patient understood and agrees to the plan.      Darcy Hess RN          Surgery Pre-Certification  Medical Record Number: [id number]  Julianna Dickey  YOB: 1975   Phone: [phone] (home) [phone] (work)  Primary Provider: No primary care provider on file.  Reason for Admit:  Surgeon: Gigi Poole MD  Surgical Procedure: Open Myomectomy  ICD-9 Coded: symptomatic uterine leiomyoma, dysmenorrhea, chronic pelvic pain  Date of Surgery: 10/31/2017  Consent signed? N/A    Date signed:   Hospital[facility]  Inpatient- Length of stay:  2 days.  Requestor:  Darcy Hess   [facility]  Darcy Hess RN

## 2017-10-18 ENCOUNTER — OFFICE VISIT (OUTPATIENT)
Dept: FAMILY MEDICINE | Facility: CLINIC | Age: 42
End: 2017-10-18
Payer: COMMERCIAL

## 2017-10-18 VITALS
DIASTOLIC BLOOD PRESSURE: 77 MMHG | TEMPERATURE: 98.1 F | WEIGHT: 186.2 LBS | OXYGEN SATURATION: 100 % | HEART RATE: 72 BPM | HEIGHT: 65 IN | SYSTOLIC BLOOD PRESSURE: 125 MMHG | BODY MASS INDEX: 31.02 KG/M2

## 2017-10-18 DIAGNOSIS — R10.13 DYSPEPSIA: ICD-10-CM

## 2017-10-18 DIAGNOSIS — D57.3 SICKLE CELL TRAIT (H): ICD-10-CM

## 2017-10-18 DIAGNOSIS — D25.2 SUBSEROUS LEIOMYOMA OF UTERUS: ICD-10-CM

## 2017-10-18 DIAGNOSIS — R10.2 CHRONIC PELVIC PAIN IN FEMALE: ICD-10-CM

## 2017-10-18 DIAGNOSIS — Z01.818 PREOP GENERAL PHYSICAL EXAM: Primary | ICD-10-CM

## 2017-10-18 DIAGNOSIS — G89.29 CHRONIC PELVIC PAIN IN FEMALE: ICD-10-CM

## 2017-10-18 LAB — HGB BLD-MCNC: 13.5 G/DL (ref 11.7–15.7)

## 2017-10-18 PROCEDURE — 36415 COLL VENOUS BLD VENIPUNCTURE: CPT | Performed by: NURSE PRACTITIONER

## 2017-10-18 PROCEDURE — 99214 OFFICE O/P EST MOD 30 MIN: CPT | Performed by: NURSE PRACTITIONER

## 2017-10-18 PROCEDURE — 85018 HEMOGLOBIN: CPT | Performed by: NURSE PRACTITIONER

## 2017-10-18 NOTE — NURSING NOTE
"Chief Complaint   Patient presents with     Pre-Op Exam       Initial /77 (BP Location: Left arm, Patient Position: Sitting, Cuff Size: Adult Regular)  Pulse 72  Temp 98.1  F (36.7  C) (Oral)  Ht 5' 5.12\" (1.654 m)  Wt 186 lb 3.2 oz (84.5 kg)  SpO2 100%  BMI 30.87 kg/m2 Estimated body mass index is 30.87 kg/(m^2) as calculated from the following:    Height as of this encounter: 5' 5.12\" (1.654 m).    Weight as of this encounter: 186 lb 3.2 oz (84.5 kg).  Medication Reconciliation: complete   Briana Phelps MA      "

## 2017-10-18 NOTE — MR AVS SNAPSHOT
After Visit Summary   10/18/2017    Julianna Dickey    MRN: [id number]           Patient Information     Date Of Birth          1975        Visit Information        Provider Department      10/18/2017 7:40 AM Marizol Chapman APRN CNP [facility]        Today's Diagnoses     Preop general physical exam    -  1    Subserous leiomyoma of uterus        Chronic pelvic pain in female        Dyspepsia        Sickle cell trait (H)          Care Instructions    Based on your medical history and these are the current health maintenance or preventive care services that you are due for (some may have been done at this visit)  Health Maintenance Due   Topic Date Due     INFLUENZA VACCINE (SYSTEM ASSIGNED)  09/01/2017         At [facility], we strive to deliver an exceptional experience to you, every time we see you.    If you receive a survey in the mail, please send us back your thoughts. We really do value your feedback.    Your care team's suggested websites for health information:  Www.[employer] : Up to date and easily searchable information on multiple topics.  Www.medlineplus.gov : medication info, interactive tutorials, watch real surgeries online  Www.familydoctor.org : good info from the Academy of Family Physicians  Www.cdc.gov : public health info, travel advisories, epidemics (H1N1)  Www.aap.org : children's health info, normal development, vaccinations  Www.health.[facility].[place].us : [place] dept of health, public health issues in [place], N1N1    How to contact your care team:   Team Ryann/Spirit [phone]         Pharmacy [phone]    Dr. Flowers, Tonie Bill PA-C, Dr. Noguera, Aditi CAT CNP, Adamaris Carrion PA-C, Dr. Grimes, and STEPHANIA Casillas CNP    Team RN: Desire      Clinic hours  M-Th 7 am-7 pm   Fri 7 am-5 pm.   Urgent care M-F 11 am-9 pm,   Sat/Sun 9 am-5 pm.  Pharmacy M-Th 8 am-8 pm Fri 8 am-6 pm  Sat/Sun 9 am-5 pm.     All  password changes, disabled accounts, or ID changes in [employer]/MyHealth will be done by our Access Services Department.    If you need help with your account or password, call: [phone]. Clinic staff no longer has the ability to change passwords.     Before Your Surgery      Call your surgeon if there is any change in your health. This includes signs of a cold or flu (such as a sore throat, runny nose, cough, rash or fever).    Do not smoke, drink alcohol or take over the counter medicine (unless your surgeon or primary care doctor tells you to) for the 24 hours before and after surgery.    If you take prescribed drugs: Follow your doctor s orders about which medicines to take and which to stop until after surgery.    Eating and drinking prior to surgery: follow the instructions from your surgeon    Take a shower or bath the night before surgery. Use the soap your surgeon gave you to gently clean your skin. If you do not have soap from your surgeon, use your regular soap. Do not shave or scrub the surgery site.  Wear clean pajamas and have clean sheets on your bed.           Follow-ups after your visit        Your next 10 appointments already scheduled     Dec 12, 2017  9:00 AM CST   Office Visit with Gigi Poole MD   [facility] ([facility])    [address] [place] [address] [phone]           Bring a current list of meds and any records pertaining to this visit. For Physicals, please bring immunization records and any forms needing to be filled out. Please arrive 10 minutes early to complete paperwork.              Who to contact     If you have questions or need follow up information about today's clinic visit or your schedule please contact [facility] directly at [phone].  Normal or non-critical lab and imaging results will be communicated to you by MyChart, letter or phone within 4 business days after the  "clinic has received the results. If you do not hear from us within 7 days, please contact the clinic through [facility] or phone. If you have a critical or abnormal lab result, we will notify you by phone as soon as possible.  Submit refill requests through [facility] or call your pharmacy and they will forward the refill request to us. Please allow 3 business days for your refill to be completed.          Additional Information About Your Visit        [email or web address] Information     [facility] gives you secure access to your electronic health record. If you see a primary care provider, you can also send messages to your care team and make appointments. If you have questions, please call your primary care clinic.  If you do not have a primary care provider, please call [phone] and they will assist you.        Care EveryWhere ID     This is your Care EveryWhere ID. This could be used by other organizations to access your [facility] medical records  [id number]        Your Vitals Were     Pulse Temperature Height Pulse Oximetry BMI (Body Mass Index)       72 98.1  F (36.7  C) (Oral) 5' 5.12\" (1.654 m) 100% 30.87 kg/m2        Blood Pressure from Last 3 Encounters:   10/18/17 125/77   09/11/17 125/73   06/21/17 129/76    Weight from Last 3 Encounters:   10/18/17 186 lb 3.2 oz (84.5 kg)   09/11/17 188 lb (85.3 kg)   06/21/17 188 lb (85.3 kg)              We Performed the Following     Hemoglobin          Today's Medication Changes          These changes are accurate as of: 10/18/17  8:12 AM.  If you have any questions, ask your nurse or doctor.               Start taking these medicines.        Dose/Directions    omeprazole 20 MG CR capsule   Commonly known as:  priLOSEC   Used for:  Dyspepsia   Started by:  Marizol Chapman APRN CNP        Dose:  20 mg   Take 1 capsule (20 mg) by mouth daily   Quantity:  90 capsule   Refills:  1            Where to get your medicines      These medications were sent to [facility] " [place], [place] - [address]  [address], [facility] [address]     Phone:  [phone]     omeprazole 20 MG CR capsule                Primary Care Provider Office Phone # Fax #    [facility] [phone]       [address]  [facility] [address]        Equal Access to Services     JEWEL TIMMONS : Hadii aad ku hadasho Soomaali, waaxda luqadaha, qaybta kaalmada adeegyada, waxay idiin hayaan adeeg kharash la'aan ah. So [facility] [phone].    ATENCIÓN: Si zain astorga, tiene a deluna disposición servicios gratuitos de asistencia lingüística. Llame al [phone].    We comply with applicable federal civil rights laws and [place] laws. We do not discriminate on the basis of race, color, national origin, age, disability, sex, sexual orientation, or gender identity.            Thank you!     Thank you for choosing [facility]  for your care. Our goal is always to provide you with excellent care. Hearing back from our patients is one way we can continue to improve our services. Please take a few minutes to complete the written survey that you may receive in the mail after your visit with us. Thank you!             Your Updated Medication List - Protect others around you: Learn how to safely use, store and throw away your medicines at www.disposemymeds.org.          This list is accurate as of: 10/18/17  8:12 AM.  Always use your most recent med list.                   Brand Name Dispense Instructions for use Diagnosis    levonorgestrel-ethinyl estradiol 0.15-30 MG-MCG per tablet    NORDETTE    84 tablet    Take 1 tablet by mouth daily    Excessive or frequent menstruation, Subserous leiomyoma of uterus       omeprazole 20 MG CR capsule    priLOSEC    90 capsule    Take 1 capsule (20 mg) by mouth daily    Dyspepsia

## 2017-10-18 NOTE — PROGRESS NOTES
[address] [address]  [phone]  Dept: [phone]    PRE-OP EVALUATION:  Today's date: 10/18/2017    Julianna Dickey (: 1975) presents for pre-operative evaluation assessment as requested by Gigi Monsalve.  She requires evaluation and anesthesia risk assessment prior to undergoing surgery/procedure for treatment of Open Myomectomy .      Date of Surgery/ Procedure: 10/31/2017  Time of Surgery/ Procedure: 7:30AM  [place] [facility]  [place] number for surgical facility:   Primary Physician: No primary care provider on file.  Type of Anesthesia Anticipated: General    Patient has a Health Care Directive or Living Will:  NO    1. NO - Do you have a history of heart attack, stroke, stent, bypass or surgery on an artery in the head, neck, heart or legs?  2. NO - Do you ever have any pain or discomfort in your chest?  3. NO - Do you have a history of  Heart Failure?  4. NO - Are you troubled by shortness of breath when: walking on the level, up a slight hill or at night?  5. NO - Do you currently have a cold, bronchitis or other respiratory infection?  6. NO - Do you have a cough, shortness of breath or wheezing?  7. NO - Do you sometimes get pains in the calves of your legs when you walk?  8. NO - Do you or anyone in your family have previous history of blood clots?  9. NO - Do you or does anyone in your family have a serious bleeding problem such as prolonged bleeding following surgeries or cuts?  10. NO - Have you ever had problems with anemia or been told to take iron pills?  11. NO - Have you had any abnormal blood loss such as black, tarry or bloody stools, or abnormal vaginal bleeding?  12. NO - Have you ever had a blood transfusion?  13. NO - Have you or any of your relatives ever had problems with anesthesia?  14. NO - Do you have sleep apnea, excessive snoring or daytime drowsiness?  15. NO - Do you have any prosthetic  heart valves?  16. NO - Do you have prosthetic joints?  17. NO - Is there any chance that you may be pregnant?    Patient also complains of dydpepsia off and on over the last month.  She denies specific food triggers but notes pain at HS especially, no nausea, vomiting, eructation, bloating, constipation or diarrhea, no change in stool character, no hematochezia,  No cp, shortness of breath, palpitations, diaphoresis, MOORE, decreased exercise tolerance, LE edema.    HPI:                                                      Brief HPI related to upcoming procedure: Patient has subserous leiomyoma of uterus with heavy menses and will have open myomectomy 10/31/17.      See problem list for active medical problems.  Problems all longstanding and stable, except as noted/documented.  See ROS for pertinent symptoms related to these conditions.                                                                                                  .    MEDICAL HISTORY:                                                    Patient Active Problem List    Diagnosis Date Noted     Fibroid      Priority: Medium     Ovarian cyst      Priority: Medium     Chronic pelvic pain in female 08/15/2016     Priority: Medium     Overview:   Has failed numerous hormonal interventions including OCPs, Mirena, Nuvaring. Unable to take NSAIDs secondary to elevated Creatinine.       Screening for malignant neoplasm of cervix 11/06/2015     Priority: Medium     Overview:   2010 NILM  2013 NILM  2015 NILM, neg HPV   40 y.o.  Plan:  Cotesting 10/2020       Sickle cell trait (H) 09/18/2015     Priority: Medium      Past Medical History:   Diagnosis Date     Fibroid      Ovarian cyst      Sickle cell trait (H)      Past Surgical History:   Procedure Laterality Date     HC INSERTION INTRAUTERINE DEVICE  2013    Mirena     HC REMOVE INTRAUTERINE DEVICE  2014     Current Outpatient Prescriptions   Medication Sig Dispense Refill     omeprazole (PRILOSEC) 20 MG CR  "capsule Take 1 capsule (20 mg) by mouth daily 90 capsule 1     levonorgestrel-ethinyl estradiol (NORDETTE) 0.15-30 MG-MCG per tablet Take 1 tablet by mouth daily (Patient not taking: Reported on 10/18/2017) 84 tablet 1     OTC products: None, except as noted above    No Known Allergies   Latex Allergy: NO    Social History   Substance Use Topics     Smoking status: Never Smoker     Smokeless tobacco: Never Used     Alcohol use Yes      Comment: social, occasionally     History   Drug Use No       REVIEW OF SYSTEMS:                                                    Constitutional, HEENT, cardiovascular, pulmonary, gi and gu systems are negative, except as otherwise noted.      EXAM:                                                    /77 (BP Location: Left arm, Patient Position: Sitting, Cuff Size: Adult Regular)  Pulse 72  Temp 98.1  F (36.7  C) (Oral)  Ht 5' 5.12\" (1.654 m)  Wt 186 lb 3.2 oz (84.5 kg)  SpO2 100%  BMI 30.87 kg/m2    GENERAL APPEARANCE: healthy, alert and no distress     EYES: EOMI, PERRL     HENT: ear canals and TM's normal and nose and mouth without ulcers or lesions     NECK: no adenopathy, no asymmetry, masses, or scars and thyroid normal to palpation     RESP: lungs clear to auscultation - no rales, rhonchi or wheezes     CV: regular rates and rhythm, normal S1 S2, no S3 or S4 and no murmur, click or rub     ABDOMEN:  soft, nontender, no HSM or masses and bowel sounds normal     MS: extremities normal- no gross deformities noted, no evidence of inflammation in joints, FROM in all extremities.     SKIN: no suspicious lesions or rashes     NEURO: Normal strength and tone, sensory exam grossly normal, mentation intact and speech normal     PSYCH: mentation appears normal. and affect normal/bright     LYMPHATICS: normal ant/post cervical, supraclavicular nodes    DIAGNOSTICS:                                                    EKG: Not indicated due to non-vascular surgery and low risk of " event (age <65 and without cardiac risk factors)  Hemoglobin (indicated for history of anemia or procedure with significant blood loss such as tonsillectomy, major intraperitoneal surgery, vascular surgery, major spine surgery, total joint replacement)    Recent Labs   Lab Test  04/19/17   1623   HGB  12.8   PLT  270   A1C  5.3        IMPRESSION:                                                    Reason for surgery/procedure: subserosal leiomyoma of uterus.open myomectomy  Diagnosis/reason for consult: preoperative physical    The proposed surgical procedure is considered INTERMEDIATE risk.    REVISED CARDIAC RISK INDEX  The patient has the following serious cardiovascular risks for perioperative complications such as (MI, PE, VFib and 3  AV Block):  No serious cardiac risks  INTERPRETATION: 0 risks: Class I (very low risk - 0.4% complication rate)    The patient has the following additional risks for perioperative complications:  No identified additional risks  The 10-year ASCVD risk score (Altaf CONNER Jr, et al., 2013) is: 0.4%    Values used to calculate the score:      Age: 42 years      Sex: Female      Is Non- : Yes      Diabetic: No      Tobacco smoker: No      Systolic Blood Pressure: 125 mmHg      Is BP treated: No      HDL Cholesterol: 56 mg/dL      Total Cholesterol: 177 mg/dL      ICD-10-CM    1. Preop general physical exam Z01.818 Hemoglobin   2. Subserous leiomyoma of uterus D25.2    3. Chronic pelvic pain in female R10.2     G89.29    4. Dyspepsia R10.13 omeprazole (PRILOSEC) 20 MG CR capsule  Discussed GERD triggers/avoidance, benefits of weight loss   5. Sickle cell trait (H) D57.3        RECOMMENDATIONS:                                                        --Patient is to take all scheduled medications on the day of surgery EXCEPT for modifications listed below.    Anticoagulant or Antiplatelet Medication Use  NSAIDS: Ibuprofen (Motrin):         Stop one day prior to surgery           APPROVAL GIVEN to proceed with proposed procedure, without further diagnostic evaluation       Signed Electronically by: STEPHANIA Valadez CNP    Copy of this evaluation report is provided to requesting physician.    [facility] Preop Guidelines

## 2017-10-18 NOTE — PROGRESS NOTES
Faxed Pre-op notes, Hgb, and no Ekg to [facility],  Right fax confirmed at 9:56 am today.  Clemencia Page MA/  For Teams Spirit and Ryann

## 2017-10-18 NOTE — PATIENT INSTRUCTIONS
Based on your medical history and these are the current health maintenance or preventive care services that you are due for (some may have been done at this visit)  Health Maintenance Due   Topic Date Due     INFLUENZA VACCINE (SYSTEM ASSIGNED)  09/01/2017         At [facility], we strive to deliver an exceptional experience to you, every time we see you.    If you receive a survey in the mail, please send us back your thoughts. We really do value your feedback.    Your care team's suggested websites for health information:  Www.[employer]org : Up to date and easily searchable information on multiple topics.  Www.medlineplus.gov : medication info, interactive tutorials, watch real surgeries online  Www.familydoctor.org : good info from the Academy of Family Physicians  Www.cdc.gov : public health info, travel advisories, epidemics (H1N1)  Www.aap.org : children's health info, normal development, vaccinations  Www.health.[facility].[place].us : [place] dept of health, public health issues in [place], N1N1    How to contact your care team:   Team Ryann/Spirit [phone]         Pharmacy [phone]    Dr. Flowers, Tonie Bill PA-C, Dr. Noguera, Aditi CAT CNP, Adamaris Carrion PA-C, Dr. Grimes, and STEPHANIA Casillas CNP    Team RN: Desire      Clinic hours  M-Th 7 am-7 pm   Fri 7 am-5 pm.   Urgent care M-F 11 am-9 pm,   Sat/Sun 9 am-5 pm.  Pharmacy M-Th 8 am-8 pm Fri 8 am-6 pm  Sat/Sun 9 am-5 pm.     All password changes, disabled accounts, or ID changes in [employer] and Services/MyHealth will be done by our Access Services Department.    If you need help with your account or password, call: [phone]. Clinic staff no longer has the ability to change passwords.     Before Your Surgery      Call your surgeon if there is any change in your health. This includes signs of a cold or flu (such as a sore throat, runny nose, cough, rash or fever).    Do not smoke, drink alcohol or take over the counter medicine  (unless your surgeon or primary care doctor tells you to) for the 24 hours before and after surgery.    If you take prescribed drugs: Follow your doctor s orders about which medicines to take and which to stop until after surgery.    Eating and drinking prior to surgery: follow the instructions from your surgeon    Take a shower or bath the night before surgery. Use the soap your surgeon gave you to gently clean your skin. If you do not have soap from your surgeon, use your regular soap. Do not shave or scrub the surgery site.  Wear clean pajamas and have clean sheets on your bed.

## 2017-11-07 ENCOUNTER — TELEPHONE (OUTPATIENT)
Dept: OBGYN | Facility: CLINIC | Age: 42
End: 2017-11-07

## 2017-11-07 NOTE — TELEPHONE ENCOUNTER
"Phone call from patient. Patient stated she has not had a BM since surgery on 10-31-17. She reported severe pelvic pain/pressure as she feels she needs to have a BM. Patient stated she has not been taking stool softeners but only started taking yesterday. Patient stated she attempted to use a glycerin suppository \"but it came right out.\" Patient was crying on the phone and very upset. Patient was with her 15 yo son who was helping her. Patient reported pain as 10/10.Patient stated she last took both pain med and IBP was at 0900.  Explained she could go to ER to be assessed as patient verbalized concerns about her sutures. Patient does not have anyone to drive her except her son who just got his license. Advised not to have him drive her as patient is crying and moaning on the phone. This RN spent > 13 mins on phone talking to her. Explained she could attempt to try another glycerin suppository and then lie down to rest. Patient stated she does feel better if she lies down. Recommended patient take some IBP with some food now. Patient had son assist her. Patient was going to reinsert suppository and rest. Patient has a heated blanket she is using for comfort/warmth. Patient stated she had sisters drive her to and from surgery but they are both at work. As this RN talked to her and son assisted her, patient started to calm down and was no longer crying on the phone. Explained will call back in a few minutes. Patient agreed and appreciative of call. Nydia Khan RN, BAN        "

## 2017-11-07 NOTE — TELEPHONE ENCOUNTER
Attempted to call patient twice on cell number and both times recording stated unable to complete call.  Called work number and left a message. Nydia Khan RN, BAN

## 2017-12-05 ENCOUNTER — TELEPHONE (OUTPATIENT)
Dept: OBGYN | Facility: CLINIC | Age: 42
End: 2017-12-05

## 2017-12-05 NOTE — LETTER
[address] [address]  Phone: [phone]    12/07/17    Julianna Dickey  [address] [facility] [address]      To whom it may concern:     Julianna Dickey is currently under my professional care.  Julianna may return to working light duty as of 12/10/17.  Julianna has a follow up appointment on 12/12/17 and will be re-evaluated at that time. Please contact our office if you have any questions.      Sincerely,      Gigi Poole MD

## 2017-12-05 NOTE — TELEPHONE ENCOUNTER
"Reason for Call:  Other     Detailed comments: Out of FMLA can I go back on light duty I have papers.    Phone Number Patient can be reached at: Cell number on file:    Telephone Information:   Mobile [phone]   You may need to dial a \"1\" before the number for michael to go thru.    Best Time: any    Can we leave a detailed message on this number? YES    Call taken on 12/5/2017 at 4:57 PM by Valeria Henderson      "

## 2017-12-06 NOTE — TELEPHONE ENCOUNTER
Spoke with patient- patient is feeling well at this time and would like to return light duty.  Patient's employer will be faxing forms to [employer] to complete.  Patient will call us back with the date she wants to return- either 12/10 or 12/11.

## 2017-12-06 NOTE — TELEPHONE ENCOUNTER
Patient had open myomectomy on 10/31/17.  Patient has upcoming post op visit scheduled with Dr. Poole on 12/12/17.  Will forward to provider to advise.

## 2017-12-06 NOTE — TELEPHONE ENCOUNTER
Ok to return to light duty work now as requested if doing well. Please complete forms.   Gigi Poole MD

## 2017-12-07 ENCOUNTER — TELEPHONE (OUTPATIENT)
Dept: OBGYN | Facility: CLINIC | Age: 42
End: 2017-12-07

## 2017-12-07 NOTE — TELEPHONE ENCOUNTER
Return call from patient- patient states her employer faxed forms to [employer].  No forms received on our end.  Patient would like a letter written to be faxed to her employer.  Patient will call back with fax number.

## 2017-12-07 NOTE — TELEPHONE ENCOUNTER
[facility] Call Center    Phone Message    Name of Caller: Julianna    Phone Number: Cell number on file:    Telephone Information:   Mobile [phone]       Best time to return call: any    May a detailed message be left on voicemail: yes    Relation to patient: Self    Reason for Call: Form or Letter   Type or form/letter needing completion: for restrictions for work, needs specific restrictions  Provider: Virgilio  Date form needed: today  Once completed: Fax form to: Griselda has fax number for patient Romina gutierrez. fax: [phone]          Action Taken: Message routed to:  [facility] p [phone]

## 2017-12-07 NOTE — TELEPHONE ENCOUNTER
Spoke with Julianna. She provided her work Fax # [phone] Attention: Romina Cabrera. Return to Work date: 12/10/17-Light Duty. Thank you.

## 2017-12-07 NOTE — TELEPHONE ENCOUNTER
Advise avoiding heavy lifting over 25 pounds for another 4 weeks from RTW date. Please notify or we can complete detailed paperwork pertinent to her job description and usual duties. Other activities should be fine if she is healing well. Keep appt as planned.   Gigi Poole MD

## 2017-12-07 NOTE — TELEPHONE ENCOUNTER
"Patient's employer is requesting more specific information, rather than \"light duty\".  Would like restrictions on pushing, pulling, lifting, if any. Please advise.  "

## 2017-12-08 NOTE — TELEPHONE ENCOUNTER
Patient is requesting that start date back to work be 12/9/17 instead of 12/11/17.  Please see below for fax instructions from previous encounter

## 2017-12-12 ENCOUNTER — OFFICE VISIT (OUTPATIENT)
Dept: OBGYN | Facility: CLINIC | Age: 42
End: 2017-12-12
Payer: COMMERCIAL

## 2017-12-12 VITALS
HEART RATE: 89 BPM | WEIGHT: 184 LBS | TEMPERATURE: 98.7 F | SYSTOLIC BLOOD PRESSURE: 132 MMHG | BODY MASS INDEX: 30.51 KG/M2 | DIASTOLIC BLOOD PRESSURE: 83 MMHG

## 2017-12-12 DIAGNOSIS — N92.0 EXCESSIVE OR FREQUENT MENSTRUATION: ICD-10-CM

## 2017-12-12 DIAGNOSIS — R10.2 CHRONIC PELVIC PAIN IN FEMALE: ICD-10-CM

## 2017-12-12 DIAGNOSIS — Z09 POSTOPERATIVE EXAMINATION: Primary | ICD-10-CM

## 2017-12-12 DIAGNOSIS — D25.2 SUBSEROUS LEIOMYOMA OF UTERUS: ICD-10-CM

## 2017-12-12 DIAGNOSIS — G89.29 CHRONIC PELVIC PAIN IN FEMALE: ICD-10-CM

## 2017-12-12 LAB
ERYTHROCYTE [DISTWIDTH] IN BLOOD BY AUTOMATED COUNT: 14.1 % (ref 10–15)
HCT VFR BLD AUTO: 36.7 % (ref 35–47)
HGB BLD-MCNC: 12.7 G/DL (ref 11.7–15.7)
MCH RBC QN AUTO: 25.8 PG (ref 26.5–33)
MCHC RBC AUTO-ENTMCNC: 34.6 G/DL (ref 31.5–36.5)
MCV RBC AUTO: 75 FL (ref 78–100)
PLATELET # BLD AUTO: 282 10E9/L (ref 150–450)
RBC # BLD AUTO: 4.92 10E12/L (ref 3.8–5.2)
WBC # BLD AUTO: 4.8 10E9/L (ref 4–11)

## 2017-12-12 PROCEDURE — 85027 COMPLETE CBC AUTOMATED: CPT | Performed by: OBSTETRICS & GYNECOLOGY

## 2017-12-12 PROCEDURE — 99024 POSTOP FOLLOW-UP VISIT: CPT | Performed by: OBSTETRICS & GYNECOLOGY

## 2017-12-12 PROCEDURE — 36415 COLL VENOUS BLD VENIPUNCTURE: CPT | Performed by: OBSTETRICS & GYNECOLOGY

## 2017-12-12 RX ORDER — LEVONORGESTREL AND ETHINYL ESTRADIOL 0.15-0.03
1 KIT ORAL DAILY
Qty: 84 TABLET | Refills: 1 | Status: SHIPPED | OUTPATIENT
Start: 2017-12-12 | End: 2018-09-07

## 2017-12-12 NOTE — LETTER
December 12, 2017      Julianna Dickey  [address] [facility] [address]        To Whom It May Concern:    Julianna Dickey was seen in our clinic. She may return to work, effectively 12/9/17, with the following restrictions for the next 4 weeks: limited to 8 hour workday and limited to lifting no greater than 25 pounds, pushing/pulling carts no greater than 35 pounds, and no excessive bending or stooping.    I hope this information is sufficient for your needs.  If you have any additional questions regarding this matter please contact our office.  Thank you.      Sincerely,        Gigi Poole MD

## 2017-12-12 NOTE — MR AVS SNAPSHOT
After Visit Summary   12/12/2017    Julianna Dickey    MRN: [id number]           Patient Information     Date Of Birth          1975        Visit Information        Provider Department      12/12/2017 9:00 AM Gigi Poole MD [facility]'s Diagnoses     Postoperative examination    -  1      Care Instructions                                                        If you have any questions regarding your visit, Please contact your care team.     [facility] Access Services: [phone]    [facility] CLINIC HOURS TELEPHONE NUMBER   Gigi Poole M.D.      Griselda-    Darcy-ALISHA Stafford-ALISHA Camp-Medical Assistant   Monday-[place]  8:00a.m-4:45 p.m  [facility]4:45 p.[facility].  [facility]  8:00a.m-4:45 p.m.  Friday-[place]  8:00a.m-4:45 p.m. [facility]  [address]  [place], [place] [address] [phone] ask [facility]  [phone] Fax  Imaging [phone]    [facility] Labor and Delivery  [address]  [place], [place] [address] [phone]    [facility]  [address] [facility] [place] [address] [phone] ask [facility]  [phone] Fax  Imaging [phone]     Urgent Care locations:    [facility] Monday-Friday  5 pm - 9 pm  Saturday and Sunday   9 am - 5 pm    Monday-Friday   11 am - 9 pm  Saturday and Sunday   9 am - 5 pm   [phone]       If you need a medication refill, please contact your pharmacy. Please allow 3 business days for your refill to be completed.  As always, Thank you for trusting us with your healthcare needs!            Follow-ups after your visit        Follow-up notes from your care team     Return in about 6 months (around 6/12/2018).      Who to contact     If you have questions or need follow up information about today's clinic visit or your schedule please  contact [facility] directly at [phone].  Normal or non-critical lab and imaging results will be communicated to you by MyChart, letter or phone within 4 business days after the clinic has received the results. If you do not hear from us within 7 days, please contact the clinic through MyChart or phone. If you have a critical or abnormal lab result, we will notify you by phone as soon as possible.  Submit refill requests through [employer] or call your pharmacy and they will forward the refill request to us. Please allow 3 business days for your refill to be completed.          Additional Information About Your Visit        DxNA[facility]af[employer] Information     [employer] gives you secure access to your electronic health record. If you see a primary care provider, you can also send messages to your care team and make appointments. If you have questions, please call your primary care clinic.  If you do not have a primary care provider, please call [phone] and they will assist you.        Care EveryWhere ID     This is your Care EveryWhere ID. This could be used by other organizations to access your [facility] medical records  [id number]        Your Vitals Were     Pulse Temperature Last Period Breastfeeding? BMI (Body Mass Index)       89 98.7  F (37.1  C) (Oral) 11/21/2017 (Approximate) No 30.51 kg/m2        Blood Pressure from Last 3 Encounters:   12/12/17 132/83   10/18/17 125/77   09/11/17 125/73    Weight from Last 3 Encounters:   12/12/17 184 lb (83.5 kg)   10/18/17 186 lb 3.2 oz (84.5 kg)   09/11/17 188 lb (85.3 kg)              Today, you had the following     No orders found for display       Primary Care Provider Office Phone # Fax #    [facility] [phone]       [address] [place]  [facility] [address]        Equal Access to Services     GAYATRI TIMMONS AH: Kamilah rowe Sodeepak, waaxda luqadaha, qaybta kaalmada adegayatri, hilton rey  laemily cotton. So [facility] [phone].    ATENCIÓN: Si habla rizwan, tiene a deluna disposición servicios gratuitos de asistencia lingüística. Justice al [phone].    We comply with applicable federal civil rights laws and [place] laws. We do not discriminate on the basis of race, color, national origin, age, disability, sex, sexual orientation, or gender identity.            Thank you!     Thank you for choosing [facility]  for your care. Our goal is always to provide you with excellent care. Hearing back from our patients is one way we can continue to improve our services. Please take a few minutes to complete the written survey that you may receive in the mail after your visit with us. Thank you!             Your Updated Medication List - Protect others around you: Learn how to safely use, store and throw away your medicines at www.disposemymeds.org.          This list is accurate as of: 12/12/17  9:11 AM.  Always use your most recent med list.                   Brand Name Dispense Instructions for use Diagnosis    levonorgestrel-ethinyl estradiol 0.15-30 MG-MCG per tablet    NORDETTE    84 tablet    Take 1 tablet by mouth daily    Excessive or frequent menstruation, Subserous leiomyoma of uterus       omeprazole 20 MG CR capsule    priLOSEC    90 capsule    Take 1 capsule (20 mg) by mouth daily    Dyspepsia

## 2017-12-12 NOTE — NURSING NOTE
"Chief Complaint   Patient presents with     Surgical Followup     Follow-up open myomectomy DOS 10/31/17       Initial /83 (BP Location: Left arm, Patient Position: Chair, Cuff Size: Adult Regular)  Pulse 89  Temp 98.7  F (37.1  C) (Oral)  Wt 184 lb (83.5 kg)  LMP 11/21/2017 (Approximate)  Breastfeeding? No  BMI 30.51 kg/m2 Estimated body mass index is 30.51 kg/(m^2) as calculated from the following:    Height as of 10/18/17: 5' 5.12\" (1.654 m).    Weight as of this encounter: 184 lb (83.5 kg).  Medication Reconciliation: complete   Zoë Ramirez CMA      "

## 2017-12-12 NOTE — PATIENT INSTRUCTIONS
If you have any questions regarding your visit, Please contact your care team.     [facility] Access Services: [phone]    [facility] CLINIC HOURS TELEPHONE NUMBER   NEIL Collins-    Eloy Stafford-ALISHA Camp-Medical Assistant   Monday-[place]  8:00a.m-4:45 p.m  Wednesday-[place] 8:00a.m-4:45 p.m.  Thursday-[place]  8:00a.m-4:45 p.m.  Friday-[place]  8:00a.m-4:45 p.m. [facility]  [address] [place] [address]  [phone] ask [facility] [place]  [phone] Fax  Imaging [phone]    [facility] Labor and Delivery  [address] [place] [address] [place] [address]  [phone]    [facility]  [address] [place] [address]  [phone] [facility]  [phone] Fax  Imaging [phone]     Urgent Care locations:    [place]        [place] Monday-Friday  5 pm - 9 pm  Saturday and Sunday   9 am - 5 pm    Monday-Friday   11 am - 9 pm  Saturday and Sunday   9 am - 5 pm   [phone]       If you need a medication refill, please contact your pharmacy. Please allow 3 business days for your refill to be completed.  As always, Thank you for trusting us with your healthcare needs!

## 2017-12-13 NOTE — PROGRESS NOTES
Julianna Dickey is a 42 year old year old who is here today for a postoperative exam.      Doing fair after recent surgery- open myomectomy.  No signif signs, symptoms or concerns except still has incisional and pelvic soreness particularly with activity. Using ibuprofen. Constipation better. Voiding fine. Afeb. LMP 11/22 normal.     Past medical, obstetrical, surgical, family and social history reviewed and as noted or updated in chart.      Exam: Abdomen and pelvis are negative except lower abd and incisional tenderness and mod uterine tenderness. No masses.  Incision: intact, no erythema, induration or discharge.    A/P: Satisfactory postop exam but some postop pain and tenderness. Reviewed operation and findings. Surgically dismissed. Instructions reviewed. See orders. Continue other general medical care. Will fax letter for RTW with lifting, pushing and pulling restrictions.  Baths are ok.   Check CBC. Resume OC. Patient to call if not improving in 2 weeks and will check pelvic u/s.   Medications and prescriptions given as noted.  I reviewed side effects, risks, benefits and instructions on proper use.    Gigi Poole MD

## 2018-09-07 ENCOUNTER — OFFICE VISIT (OUTPATIENT)
Dept: FAMILY MEDICINE | Facility: CLINIC | Age: 43
End: 2018-09-07
Payer: COMMERCIAL

## 2018-09-07 VITALS
WEIGHT: 188.2 LBS | DIASTOLIC BLOOD PRESSURE: 72 MMHG | TEMPERATURE: 98.3 F | HEART RATE: 85 BPM | OXYGEN SATURATION: 100 % | HEIGHT: 65 IN | RESPIRATION RATE: 16 BRPM | SYSTOLIC BLOOD PRESSURE: 123 MMHG | BODY MASS INDEX: 31.36 KG/M2

## 2018-09-07 DIAGNOSIS — E66.811 OBESITY (BMI 30.0-34.9): ICD-10-CM

## 2018-09-07 DIAGNOSIS — R10.13 ABDOMINAL PAIN, EPIGASTRIC: Primary | ICD-10-CM

## 2018-09-07 DIAGNOSIS — R10.13 DYSPEPSIA: ICD-10-CM

## 2018-09-07 DIAGNOSIS — D57.3 SICKLE CELL TRAIT (H): ICD-10-CM

## 2018-09-07 DIAGNOSIS — K21.00 GASTROESOPHAGEAL REFLUX DISEASE WITH ESOPHAGITIS: ICD-10-CM

## 2018-09-07 LAB
ALBUMIN SERPL-MCNC: 4 G/DL (ref 3.4–5)
ALP SERPL-CCNC: 72 U/L (ref 40–150)
ALT SERPL W P-5'-P-CCNC: 26 U/L (ref 0–50)
ANION GAP SERPL CALCULATED.3IONS-SCNC: 6 MMOL/L (ref 3–14)
AST SERPL W P-5'-P-CCNC: 15 U/L (ref 0–45)
BILIRUB SERPL-MCNC: 0.6 MG/DL (ref 0.2–1.3)
BUN SERPL-MCNC: 16 MG/DL (ref 7–30)
CALCIUM SERPL-MCNC: 9 MG/DL (ref 8.5–10.1)
CHLORIDE SERPL-SCNC: 100 MMOL/L (ref 94–109)
CO2 SERPL-SCNC: 33 MMOL/L (ref 20–32)
CREAT SERPL-MCNC: 1.25 MG/DL (ref 0.52–1.04)
ERYTHROCYTE [DISTWIDTH] IN BLOOD BY AUTOMATED COUNT: 14.9 % (ref 10–15)
GFR SERPL CREATININE-BSD FRML MDRD: 47 ML/MIN/1.7M2
GLUCOSE SERPL-MCNC: 129 MG/DL (ref 70–99)
HCT VFR BLD AUTO: 37 % (ref 35–47)
HGB BLD-MCNC: 13 G/DL (ref 11.7–15.7)
MCH RBC QN AUTO: 25.9 PG (ref 26.5–33)
MCHC RBC AUTO-ENTMCNC: 35.1 G/DL (ref 31.5–36.5)
MCV RBC AUTO: 74 FL (ref 78–100)
PLATELET # BLD AUTO: 270 10E9/L (ref 150–450)
POTASSIUM SERPL-SCNC: 3.8 MMOL/L (ref 3.4–5.3)
PROT SERPL-MCNC: 7.6 G/DL (ref 6.8–8.8)
RBC # BLD AUTO: 5.02 10E12/L (ref 3.8–5.2)
SODIUM SERPL-SCNC: 139 MMOL/L (ref 133–144)
WBC # BLD AUTO: 5.8 10E9/L (ref 4–11)

## 2018-09-07 PROCEDURE — 36415 COLL VENOUS BLD VENIPUNCTURE: CPT | Performed by: FAMILY MEDICINE

## 2018-09-07 PROCEDURE — 85027 COMPLETE CBC AUTOMATED: CPT | Performed by: FAMILY MEDICINE

## 2018-09-07 PROCEDURE — 80053 COMPREHEN METABOLIC PANEL: CPT | Performed by: FAMILY MEDICINE

## 2018-09-07 PROCEDURE — 99214 OFFICE O/P EST MOD 30 MIN: CPT | Performed by: FAMILY MEDICINE

## 2018-09-07 RX ORDER — PANTOPRAZOLE SODIUM 40 MG/1
40 TABLET, DELAYED RELEASE ORAL DAILY
Qty: 30 TABLET | Refills: 3 | Status: SHIPPED | OUTPATIENT
Start: 2018-09-07 | End: 2019-10-01

## 2018-09-07 ASSESSMENT — PAIN SCALES - GENERAL: PAINLEVEL: NO PAIN (0)

## 2018-09-07 NOTE — MR AVS SNAPSHOT
After Visit Summary   9/7/2018    Julianna Dickey    MRN: [id number]           Patient Information     Date Of Birth          1975        Visit Information        Provider Department      9/7/2018 4:40 PM Jennifer Garcia MD [facility]        Today's Diagnoses     Abdominal pain, epigastric    -  1    Gastroesophageal reflux disease with esophagitis        Sickle cell trait (H)        Dyspepsia          Care Instructions      Epigastric Pain (Uncertain Cause)     Epigastric pain can be a sign of disease in the upper abdomen. Common causes include:    Acid reflux (stomach acid flowing up into the esophagus)    Gastritis (irritation of the stomach lining)    Peptic Ulcer Disease    Inflammation of the pancreas    Gallstone    Infection in the gallbladder  Pain may be dull or burning. It may spread upward to the chest or to the back. There may be other symptoms such as belching, bloating, cramps or hunger pains. There may be weight loss or poor appetite, nausea or vomiting.  Since the diagnosis of your pain is not certain yet, further tests may sometimes be needed. Sometimes the doctor will treat you for the most likely condition to see if there is improvement before doing further tests.  Home care  Medicines    Antacids help neutralize the normal acids in your stomach. Examples are Maalox, Mylanta, Rolaids, and Tums. If you don t like the liquid, you can also try a chewable one. You may find one works better than another for you. Overuse can cause diarrhea or constipation.    Acid blockers (H2 blockers) decrease acid production. Examples are cimetidine (Tagamet), famotidine (Pepcid) and ranitidine (Zantac).    Acid inhibitors (PPIs) decrease acid production in a different way than the blockers. You may find they work better, but can take a little longer to take effect.  Examples are omeprazole (Prilosec), lansoprazole (Prevacid), pantoprazole (Protonix), rabeprazole (Aciphex), and  esomeprazole (Nexium).    Take an antacid 30-60 minutes after eating and at bedtime, but not at the same time as an acid blocker.    Try not to take NSAIDs. Aspirin may also cause problems, but if taking it for your heart or other medical reasons, talk to your doctor before stopping it; you do not want to cause a worse problem, like a heart attack or stroke.  Diet    If certain foods seem to cause your spasm, try to avoid them.     Eat slowly and chew food well before swallowing. Symptoms of gastritis can be worsened by certain foods. Limit or avoid fatty, fried, and spicy foods, as well as coffee, chocolate, mint, and foods with high acid content such as tomatoes and citrus fruit and juices (orange, grapefruit, lemon).    Avoid alcohol, caffeine, and tobacco, which can delay healing and worsen your problem.    Try eating smaller meals with snacks in between  Follow-up care  Follow up with your healthcare provider or as advised.  When to seek medical advice  Call your healthcare provider right away if any of the following occur:    Stomach pain worsens or moves to the right lower part of the abdomen    Chest pain appears, or if it worsens or spreads to the chest, back, neck, shoulder, or arm    Frequent vomiting (can t keep down liquids)    Blood in the stool or vomit (red or black color)    Feeling weak or dizzy, fainting, or having trouble breathing    Fever of 100.4 F (38 C) or higher, or as directed by your healthcare provider    Abdominal swelling  Date Last Reviewed: 9/25/2015 2000-2017 [employer]. [address] [place] [address], [place] [id number]. All rights reserved. This information is not intended as a substitute for professional medical care. Always follow your healthcare professional's instructions.        GERD (Adult)    The esophagus is a tube that carries food from the mouth to the stomach. A valve at the lower end of the esophagus prevents stomach acid from flowing upward. When this valve  "doesn't work properly, stomach contents may repeatedly flow back up (reflux) into the esophagus. This is called gastroesophageal reflux disease (GERD). GERD can irritate the esophagus. It can cause problems with swallowing or breathing. In severe cases, GERD can cause recurrent pneumonia or other serious problems.  Symptoms of reflux include burning, pressure or sharp pain in the upper abdomen or mid to lower chest. The pain can spread to the neck, back, or shoulder. There may be belching, an acid taste in the back of the throat, chronic cough, or sore throat or hoarseness. GERD symptoms often occur during the day after a big meal. They can also occur at night when lying down.   Home care  Lifestyle changes can help reduce symptoms. If needed, medicines may be prescribed. Symptoms often improve with treatment, but if treatment is stopped, the symptoms often return after a few months. So most persons with GERD will need to continue treatment.  Lifestyle changes    Limit or avoid fatty, fried, and spicy foods, as well as coffee, chocolate, mint, and foods with high acid content such as tomatoes and citrus fruit and juices (orange, grapefruit, lemon).    Don t eat large meals, especially at night. Frequent, smaller meals are best. Do not lie down right after eating. And don t eat anything 3 hours before going to bed.    Avoid drinking alcohol and smoking. As much as possible, stay away from second hand smoke.    If you are overweight, losing weight will reduce symptoms.     Avoid wearing tight clothing around your stomach area.    If your symptoms occur during sleep, use a foam wedge to elevate your upper body (not just your head.) Or, place 4\" blocks under the head of your bed.  Medicines  If needed, medicines can help relieve the symptoms of GERD and prevent damage to the esophagus. Discuss a medicine plan with your healthcare provider. This may include one or more of the following medicines:    Antacids to help " neutralize the normal acids in your stomach.    Acid blockers (H2 blockers) to decrease acid production.    Acid inhibitors (PPIs) to decrease acid production in a different way than the blockers. They may work better, but can take a little longer to take effect.  Take an antacid 30-60 minutes after eating and at bedtime, but not at the same time as an acid blocker.  Try not to take medicines such as ibuprofen and aspirin. If you are taking aspirin for your heart or other medical reasons, talk to your healthcare provider about stopping it.  Follow-up care  Follow up with your healthcare provider or as advised by our staff.  When to seek medical advice  Call your healthcare provider if any of the following occur:    Stomach pain gets worse or moves to the lower right abdomen (appendix area)    Chest pain appears or gets worse, or spreads to the back, neck, shoulder, or arm    Frequent vomiting (can t keep down liquids)    Blood in the stool or vomit (red or black in color)    Feeling weak or dizzy    Fever of 100.4 F (38 C) or higher, or as directed by your healthcare provider  Date Last Reviewed: 6/23/2015 2000-2017 [employer]. [address] [place] [address]. All rights reserved. This information is not intended as a substitute for professional medical care. Always follow your healthcare professional's instructions.        Understanding Gastric Ulcers    A gastric ulcer is an open sore in the stomach lining. It is sometimes called a peptic ulcer. This is a more general term for ulcers that may be in the stomach or the upper part of the small intestine. Ulcers can cause pain. But they may also have no symptoms for a long time.  What causes gastric ulcers?  Gastric ulcers have a few common causes. To find the cause of your ulcer, your healthcare provider will give you an exam and take your health history. He or she may also order some tests. The main causes of gastric ulcers  include:    Infection with the H. pylori (Helicobacter pylori) bacteria. This damages the stomach lining. Digestive juices can then harm the digestive tract.    Long-term use of some over-the-counter pain medicines. This reduces the body s ability to protect the stomach from damage.  Other causes of gastric ulcers include:    Heavy alcohol use    Having a family history of ulcers    In rare cases, a tumor in the digestive tract may cause an ulcer  Symptoms of a gastric ulcer  Ulcer symptoms may appear and then go away for a time. Symptoms of a gastric ulcer may include:    Stomach pain, often a dull or burning feeling toward the top of your belly    Feeling full or bloated    Heartburn or acid reflux    Upset stomach (nausea) or vomiting    Vomiting blood    Lack of appetite    Weight loss    Black stool    Red blood in the stool  Treatment for a gastric ulcer  Treatment for gastric ulcers may depend on what is causing them. Treatment may include:    Not using over-the-counter medicines. You will likely need to stop taking these medicines. But in some cases these medicines can t be safely stopped. Check with your healthcare provider to see what is best for you.     Taking medicines to ease symptoms. These medicines may help to reduce the amount of acid your stomach makes. They also may help coat your stomach lining.    Taking antibiotics. If your ulcer was caused by H. pylori, your provider will likely prescribe antibiotics to get rid of the infection.    Having an endoscopy. This is often done to check the stomach and diagnose the ulcer. In some cases it can also treat the ulcer. It involves passing a flexible tube through your mouth into your stomach and small intestine.    Using a tube (catheter) to stop the bleeding. A thin tube is passed into one of your blood vessels. Special tools are used to help stop the bleeding.    Having surgery. You often may need this if the ulcer has caused severe symptoms.  Making  some lifestyle changes can reduce ulcer symptoms. It may also prevent more damage to your digestive tract. These changes include:    Not taking over-the-counter pain medicines. Talk with your provider about using another type of pain reliever.    Not taking aspirin unless your provider has advised it    Limiting the amount of alcohol you drink    Quitting smoking  Possible complications of a gastric ulcer  Gastric ulcers can have serious complications. These can include:    Bleeding into the stomach    A hole (perforation) in the stomach    A blockage that interferes with food moving from your stomach to the small intestine  An ongoing infection with H. pylori may be a risk factor for stomach cancer. This is one reason it is important to get rid of this bacteria.  When to call your healthcare provider  Call your healthcare provider right away if you have any of these:    Vomiting blood, or vomit that looks like coffee grounds    Bloody, black, or tarry-looking stools    Fever of 100.4 F (38 C) or higher, or as directed    Pain that gets worse    Symptoms that don t get better with treatment, or symptoms that get worse    New symptoms   Date Last Reviewed: 5/1/2016 2000-2017 [employer]. [address] [place] [address]. All rights reserved. This information is not intended as a substitute for professional medical care. Always follow your healthcare professional's instructions.                Follow-ups after your visit        Future tests that were ordered for you today     Open Future Orders        Priority Expected Expires Ordered    H Pylori antigen, stool Routine  10/7/2018 9/7/2018            Who to contact     If you have questions or need follow up information about today's clinic visit or your schedule please contact [facility] directly at [phone].  Normal or non-critical lab and imaging results will be communicated to you by MyChart, letter or phone within  "4 business days after the clinic has received the results. If you do not hear from us within 7 days, please contact the clinic through [employer] or phone. If you have a critical or abnormal lab result, we will notify you by phone as soon as possible.  Submit refill requests through [employer] or call your pharmacy and they will forward the refill request to us. Please allow 3 business days for your refill to be completed.          Additional Information About Your Visit        [employer] Information     [employer] gives you secure access to your electronic health record. If you see a primary care provider, you can also send messages to your care team and make appointments. If you have questions, please call your primary care clinic.  If you do not have a primary care provider, please call [phone] and they will assist you.        Care EveryWhere ID     This is your Care EveryWhere ID. This could be used by other organizations to access your [facility] medical records  [id number]        Your Vitals Were     Pulse Temperature Respirations Height Last Period Pulse Oximetry    85 98.3  F (36.8  C) (Oral) 16 5' 5\" (1.651 m) 09/02/2018 100%    BMI (Body Mass Index)                   31.32 kg/m2            Blood Pressure from Last 3 Encounters:   09/07/18 123/72   12/12/17 132/83   10/18/17 125/77    Weight from Last 3 Encounters:   09/07/18 188 lb 3.2 oz (85.4 kg)   12/12/17 184 lb (83.5 kg)   10/18/17 186 lb 3.2 oz (84.5 kg)              We Performed the Following     CBC with platelets     Comprehensive metabolic panel          Today's Medication Changes          These changes are accurate as of 9/7/18  5:25 PM.  If you have any questions, ask your nurse or doctor.               Start taking these medicines.        Dose/Directions    pantoprazole 40 MG EC tablet   Commonly known as:  PROTONIX   Used for:  Abdominal pain, epigastric   Started by:  Jennifer Garcia MD        Dose:  40 mg   Take 1 tablet (40 mg) by mouth " daily Take 30-60 minutes before a meal.   Quantity:  30 tablet   Refills:  3            Where to get your medicines      These medications were sent [place] [facility] [place] [place] [address]  [address], [place] [address]     Phone:  [phone]     pantoprazole 40 MG EC tablet                Primary Care Provider Office Phone # Fax #    [facility] [phone]       [address]  [facility] [address]        Equal Access to Services     [facility]: Hadii aad ku hadasho Soomaali, waaxda luqadaha, qaybta kaalmada adeegyada, waxay idiin hayaan adeeg khararaquel danielle . So [facility] [phone].    ATENCIÓN: Si habla español, tiene a deluna disposición servicios gratuitos de asistencia lingüística. [facility] [phone].    We comply with applicable federal civil rights laws and [place] laws. We do not discriminate on the basis of race, color, national origin, age, disability, sex, sexual orientation, or gender identity.            Thank you!     Thank you for choosing [facility]  for your care. Our goal is always to provide you with excellent care. Hearing back from our patients is one way we can continue to improve our services. Please take a few minutes to complete the written survey that you may receive in the mail after your visit with us. Thank you!             Your Updated Medication List - Protect others around you: Learn how to safely use, store and throw away your medicines at www.disposemymeds.org.          This list is accurate as of 9/7/18  5:25 PM.  Always use your most recent med list.                   Brand Name Dispense Instructions for use Diagnosis    omeprazole 20 MG CR capsule    priLOSEC    90 capsule    Take 1 capsule (20 mg) by mouth daily    Dyspepsia       pantoprazole 40 MG EC tablet    PROTONIX    30 tablet    Take 1 tablet (40 mg) by mouth daily Take 30-60 minutes before a meal.    Abdominal  pain, epigastric

## 2018-09-07 NOTE — PATIENT INSTRUCTIONS
Epigastric Pain (Uncertain Cause)     Epigastric pain can be a sign of disease in the upper abdomen. Common causes include:    Acid reflux (stomach acid flowing up into the esophagus)    Gastritis (irritation of the stomach lining)    Peptic Ulcer Disease    Inflammation of the pancreas    Gallstone    Infection in the gallbladder  Pain may be dull or burning. It may spread upward to the chest or to the back. There may be other symptoms such as belching, bloating, cramps or hunger pains. There may be weight loss or poor appetite, nausea or vomiting.  Since the diagnosis of your pain is not certain yet, further tests may sometimes be needed. Sometimes the doctor will treat you for the most likely condition to see if there is improvement before doing further tests.  Home care  Medicines    Antacids help neutralize the normal acids in your stomach. Examples are Maalox, Mylanta, Rolaids, and Tums. If you don t like the liquid, you can also try a chewable one. You may find one works better than another for you. Overuse can cause diarrhea or constipation.    Acid blockers (H2 blockers) decrease acid production. Examples are cimetidine (Tagamet), famotidine (Pepcid) and ranitidine (Zantac).    Acid inhibitors (PPIs) decrease acid production in a different way than the blockers. You may find they work better, but can take a little longer to take effect.  Examples are omeprazole (Prilosec), lansoprazole (Prevacid), pantoprazole (Protonix), rabeprazole (Aciphex), and esomeprazole (Nexium).    Take an antacid 30-60 minutes after eating and at bedtime, but not at the same time as an acid blocker.    Try not to take NSAIDs. Aspirin may also cause problems, but if taking it for your heart or other medical reasons, talk to your doctor before stopping it; you do not want to cause a worse problem, like a heart attack or stroke.  Diet    If certain foods seem to cause your spasm, try to avoid them.     Eat slowly and chew food well  before swallowing. Symptoms of gastritis can be worsened by certain foods. Limit or avoid fatty, fried, and spicy foods, as well as coffee, chocolate, mint, and foods with high acid content such as tomatoes and citrus fruit and juices (orange, grapefruit, lemon).    Avoid alcohol, caffeine, and tobacco, which can delay healing and worsen your problem.    Try eating smaller meals with snacks in between  Follow-up care  Follow up with your healthcare provider or as advised.  When to seek medical advice  Call your healthcare provider right away if any of the following occur:    Stomach pain worsens or moves to the right lower part of the abdomen    Chest pain appears, or if it worsens or spreads to the chest, back, neck, shoulder, or arm    Frequent vomiting (can t keep down liquids)    Blood in the stool or vomit (red or black color)    Feeling weak or dizzy, fainting, or having trouble breathing    Fever of 100.4 F (38 C) or higher, or as directed by your healthcare provider    Abdominal swelling  Date Last Reviewed: 9/25/2015 2000-2017 [employer]. [address] [place] [address]. All rights reserved. This information is not intended as a substitute for professional medical care. Always follow your healthcare professional's instructions.        GERD (Adult)    The esophagus is a tube that carries food from the mouth to the stomach. A valve at the lower end of the esophagus prevents stomach acid from flowing upward. When this valve doesn't work properly, stomach contents may repeatedly flow back up (reflux) into the esophagus. This is called gastroesophageal reflux disease (GERD). GERD can irritate the esophagus. It can cause problems with swallowing or breathing. In severe cases, GERD can cause recurrent pneumonia or other serious problems.  Symptoms of reflux include burning, pressure or sharp pain in the upper abdomen or mid to lower chest. The pain can spread to the neck, back, or  "shoulder. There may be belching, an acid taste in the back of the throat, chronic cough, or sore throat or hoarseness. GERD symptoms often occur during the day after a big meal. They can also occur at night when lying down.   Home care  Lifestyle changes can help reduce symptoms. If needed, medicines may be prescribed. Symptoms often improve with treatment, but if treatment is stopped, the symptoms often return after a few months. So most persons with GERD will need to continue treatment.  Lifestyle changes    Limit or avoid fatty, fried, and spicy foods, as well as coffee, chocolate, mint, and foods with high acid content such as tomatoes and citrus fruit and juices (orange, grapefruit, lemon).    Don t eat large meals, especially at night. Frequent, smaller meals are best. Do not lie down right after eating. And don t eat anything 3 hours before going to bed.    Avoid drinking alcohol and smoking. As much as possible, stay away from second hand smoke.    If you are overweight, losing weight will reduce symptoms.     Avoid wearing tight clothing around your stomach area.    If your symptoms occur during sleep, use a foam wedge to elevate your upper body (not just your head.) Or, place 4\" blocks under the head of your bed.  Medicines  If needed, medicines can help relieve the symptoms of GERD and prevent damage to the esophagus. Discuss a medicine plan with your healthcare provider. This may include one or more of the following medicines:    Antacids to help neutralize the normal acids in your stomach.    Acid blockers (H2 blockers) to decrease acid production.    Acid inhibitors (PPIs) to decrease acid production in a different way than the blockers. They may work better, but can take a little longer to take effect.  Take an antacid 30-60 minutes after eating and at bedtime, but not at the same time as an acid blocker.  Try not to take medicines such as ibuprofen and aspirin. If you are taking aspirin for your " heart or other medical reasons, talk to your healthcare provider about stopping it.  Follow-up care  Follow up with your healthcare provider or as advised by our staff.  When to seek medical advice  Call your healthcare provider if any of the following occur:    Stomach pain gets worse or moves to the lower right abdomen (appendix area)    Chest pain appears or gets worse, or spreads to the back, neck, shoulder, or arm    Frequent vomiting (can t keep down liquids)    Blood in the stool or vomit (red or black in color)    Feeling weak or dizzy    Fever of 100.4 F (38 C) or higher, or as directed by your healthcare provider  Date Last Reviewed: 6/23/2015 2000-2017 [employer]. [address] [place] [address], [place] [address]. All rights reserved. This information is not intended as a substitute for professional medical care. Always follow your healthcare professional's instructions.        Understanding Gastric Ulcers    A gastric ulcer is an open sore in the stomach lining. It is sometimes called a peptic ulcer. This is a more general term for ulcers that may be in the stomach or the upper part of the small intestine. Ulcers can cause pain. But they may also have no symptoms for a long time.  What causes gastric ulcers?  Gastric ulcers have a few common causes. To find the cause of your ulcer, your healthcare provider will give you an exam and take your health history. He or she may also order some tests. The main causes of gastric ulcers include:    Infection with the H. pylori (Helicobacter pylori) bacteria. This damages the stomach lining. Digestive juices can then harm the digestive tract.    Long-term use of some over-the-counter pain medicines. This reduces the body s ability to protect the stomach from damage.  Other causes of gastric ulcers include:    Heavy alcohol use    Having a family history of ulcers    In rare cases, a tumor in the digestive tract may cause an ulcer  Symptoms of a gastric  ulcer  Ulcer symptoms may appear and then go away for a time. Symptoms of a gastric ulcer may include:    Stomach pain, often a dull or burning feeling toward the top of your belly    Feeling full or bloated    Heartburn or acid reflux    Upset stomach (nausea) or vomiting    Vomiting blood    Lack of appetite    Weight loss    Black stool    Red blood in the stool  Treatment for a gastric ulcer  Treatment for gastric ulcers may depend on what is causing them. Treatment may include:    Not using over-the-counter medicines. You will likely need to stop taking these medicines. But in some cases these medicines can t be safely stopped. Check with your healthcare provider to see what is best for you.     Taking medicines to ease symptoms. These medicines may help to reduce the amount of acid your stomach makes. They also may help coat your stomach lining.    Taking antibiotics. If your ulcer was caused by H. pylori, your provider will likely prescribe antibiotics to get rid of the infection.    Having an endoscopy. This is often done to check the stomach and diagnose the ulcer. In some cases it can also treat the ulcer. It involves passing a flexible tube through your mouth into your stomach and small intestine.    Using a tube (catheter) to stop the bleeding. A thin tube is passed into one of your blood vessels. Special tools are used to help stop the bleeding.    Having surgery. You often may need this if the ulcer has caused severe symptoms.  Making some lifestyle changes can reduce ulcer symptoms. It may also prevent more damage to your digestive tract. These changes include:    Not taking over-the-counter pain medicines. Talk with your provider about using another type of pain reliever.    Not taking aspirin unless your provider has advised it    Limiting the amount of alcohol you drink    Quitting smoking  Possible complications of a gastric ulcer  Gastric ulcers can have serious complications. These can include:     Bleeding into the stomach    A hole (perforation) in the stomach    A blockage that interferes with food moving from your stomach to the small intestine  An ongoing infection with H. pylori may be a risk factor for stomach cancer. This is one reason it is important to get rid of this bacteria.  When to call your healthcare provider  Call your healthcare provider right away if you have any of these:    Vomiting blood, or vomit that looks like coffee grounds    Bloody, black, or tarry-looking stools    Fever of 100.4 F (38 C) or higher, or as directed    Pain that gets worse    Symptoms that don t get better with treatment, or symptoms that get worse    New symptoms   Date Last Reviewed: 5/1/2016 2000-2017 [employer]. [address] [place] [address], [place] [address]. All rights reserved. This information is not intended as a substitute for professional medical care. Always follow your healthcare professional's instructions.

## 2018-09-07 NOTE — PROGRESS NOTES
SUBJECTIVE:   Julianna Dickey is a 43 year old female who presents to clinic today for the following health issues:    GERD/Heartburn  Onset: 2 weeks     Description:     Burning in chest: YES    Intensity: moderate    Progression of Symptoms: worsening    Accompanying Signs & Symptoms:  Does it feel like food gets stuck: YES  Nausea: no  Vomiting (bloody?): no  Abdominal Pain: no  Black-Tarry stools: no:  Bloody stools: no  Headaches: YES     History:   Previous ulcers: no    Precipitating factors:   Caffeine use: YES- tea  Alcohol use: YES  NSAID/Aspirin use: YES  Tobacco use: no  Worse with fatty foods and spicy foods.    Therapies Tried and outcome: apple cider vinegar some relief         Problem list and histories reviewed & adjusted, as indicated.  Additional history: as documented    Patient Active Problem List   Diagnosis     Sickle cell trait (H)     Chronic pelvic pain in female     Dyspepsia     Gastroesophageal reflux disease with esophagitis     Past Surgical History:   Procedure Laterality Date     HC INSERTION INTRAUTERINE DEVICE  2013    Mirena     HC REMOVE INTRAUTERINE DEVICE  2014     MYOMECTOMY UTERUS  2017    benign fibroid       Social History   Substance Use Topics     Smoking status: Never Smoker     Smokeless tobacco: Never Used     Alcohol use Yes      Comment: social, occasionally     History reviewed. No pertinent family history.      Current Outpatient Prescriptions   Medication Sig Dispense Refill     pantoprazole (PROTONIX) 40 MG EC tablet Take 1 tablet (40 mg) by mouth daily Take 30-60 minutes before a meal. 30 tablet 3     omeprazole (PRILOSEC) 20 MG CR capsule Take 1 capsule (20 mg) by mouth daily (Patient not taking: Reported on 9/7/2018) 90 capsule 1     No Known Allergies  Recent Labs   Lab Test  04/19/17   1623  04/14/17   0906   A1C  5.3   --    LDL   --   96   HDL   --   56   TRIG   --   125   TSH  2.27   --       BP Readings from Last 3 Encounters:   09/07/18 123/72   12/12/17  "132/83   10/18/17 125/77    Wt Readings from Last 3 Encounters:   09/07/18 188 lb 3.2 oz (85.4 kg)   12/12/17 184 lb (83.5 kg)   10/18/17 186 lb 3.2 oz (84.5 kg)                  Labs reviewed in EPIC    Reviewed and updated as needed this visit by clinical staff       Reviewed and updated as needed this visit by Provider         ROS:  Constitutional, HEENT, cardiovascular, pulmonary, gi and gu systems are negative, except as otherwise noted.    OBJECTIVE:     /72 (BP Location: Right arm, Patient Position: Sitting, Cuff Size: Adult Large)  Pulse 85  Temp 98.3  F (36.8  C) (Oral)  Resp 16  Ht 5' 5\" (1.651 m)  Wt 188 lb 3.2 oz (85.4 kg)  LMP 09/02/2018  SpO2 100%  BMI 31.32 kg/m2  Body mass index is 31.32 kg/(m^2).  GENERAL: healthy, alert, well nourished, well hydrated, no distress, obese  HENT: ear canals- normal; TMs- normal; Nose- normal; Mouth- no ulcers, no lesions, throat is clear with no erythema or exudate.   NECK: no tenderness, no adenopathy, no asymmetry, no masses, no stiffness; thyroid- normal to palpation  RESP: lungs clear to auscultation - no rales, no rhonchi, no wheezes  CV: regular rates and rhythm, normal S1 S2, no S3 or S4 and no murmur, no click or rub -  ABDOMEN: soft,  Tenderness mid epigastric area without rebound, no  hepatosplenomegaly, no masses, normal bowel sounds  MS: extremities- no gross deformities noted, no edema  SKIN: no suspicious lesions, no rashes  NEURO: strength and tone- normal, sensory exam- grossly normal, mentation- intact, speech- normal, reflexes- symmetric  BACK: no CVA tenderness, no paralumbar tenderness  PSYCH: Alert and oriented times 3; speech- coherent , normal rate and volume; able to articulate logical thoughts, able to abstract reason, no tangential thoughts, no hallucinations or delusions, affect- normal     Diagnostic Test Results:  No results found for this or any previous visit (from the past 24 hour(s)).    ASSESSMENT/PLAN:         Tobacco " "Cessation:   reports that she has never smoked. She has never used smokeless tobacco.      BMI:   Estimated body mass index is 31.32 kg/(m^2) as calculated from the following:    Height as of this encounter: 5' 5\" (1.651 m).    Weight as of this encounter: 188 lb 3.2 oz (85.4 kg).   Weight management plan: Discussed healthy diet and exercise guidelines and patient will follow up in 3 months in clinic to re-evaluate.        ICD-10-CM    1. Abdominal pain, epigastric- most likely due to gastritis or peptic ulcer disease, if symptoms do not resolve in the next 2-3 weeks on medication, recommend endoscopy.  R10.13 Comprehensive metabolic panel     CBC with platelets     pantoprazole (PROTONIX) 40 MG EC tablet daily for 1-2 months.    2. Gastroesophageal reflux disease with esophagitis K21.0 Recurrent heart burn and reflux symptoms. Sometimes has one meal a day later in evening. Discussed 3 smaller meals a day and avoid over eating, sugars, high fat meals, too much starchy foods   3. Sickle cell trait (H) D57.3 Recheck hemoglobin    4. Dyspepsia R10.13 H Pylori antigen, stool- check for infectious cause of symptoms. Avoid all NSAID's or aspirin    5. Obesity (BMI 30.0-34.9) E66.9 Weight loss counseling done        FURTHER TESTING:       - abdominal ultrasound       - endoscopy  CONSULTATION/REFERRAL to gastroenterology   FUTURE LABS:       - Schedule fasting labs in 6 months  FUTURE APPOINTMENTS:       - Follow-up visit in 2-3 weeks or sooner if any questions or concerns.   Work on weight loss  Regular exercise  See Patient Instructions    Jennifer Garcia MD  [facility]  "

## 2018-09-08 ASSESSMENT — PATIENT HEALTH QUESTIONNAIRE - PHQ9: SUM OF ALL RESPONSES TO PHQ QUESTIONS 1-9: 4

## 2018-09-08 NOTE — PROGRESS NOTES
Dear Julianna    Your test results are attached. I am happy to let you know that they are stable.    The hemoglobin is normal and you do not have anemia. The kidney test is a little low and we should recheck this in 3 months. I also think that you should have an ultrasound to check the liver and kidneys. I will put in a referral for this.    Please schedule your ultrasound by calling [facility] at [phone].      Please contact me by [email or web address] if you have any questions about your labs or management.    Jennifer Garcia MD

## 2018-09-09 PROCEDURE — 87338 HPYLORI STOOL AG IA: CPT | Performed by: FAMILY MEDICINE

## 2018-09-10 ENCOUNTER — TELEPHONE (OUTPATIENT)
Dept: FAMILY MEDICINE | Facility: CLINIC | Age: 43
End: 2018-09-10

## 2018-09-10 DIAGNOSIS — R10.13 DYSPEPSIA: ICD-10-CM

## 2018-09-10 NOTE — TELEPHONE ENCOUNTER
Reason for Call:  Other Work note    Detailed comments: patient is requesting a return to work note for today, 9/10/18. Please fax to patient employer at [phone] att: Romina Cabrera.     Phone Number Patient can be reached at: Cell number on file:    Telephone Information:   Mobile [phone]       Best Time: anytime     Can we leave a detailed message on this number? YES    Call taken on 9/10/2018 at 11:56 AM by Yi Garcia

## 2018-09-10 NOTE — LETTER
[address] [address]  Phone: [phone]    September 10, 2018        Julianna Dickey  [address] [facility] [address]          To whom it may concern:    RE: Julianna Dickey    Patient may return to work 9/10/2018  with the following:  No working or lifting restrictions    Please contact me for questions or concerns.      Sincerely,          Jennifer Garcia MD

## 2018-09-10 NOTE — TELEPHONE ENCOUNTER
Dr. Garcia is not in clinic today, routing message to Dr. Garcia to address when in clinic. Ralph Brown,  [employer]

## 2018-09-11 LAB
H PYLORI AG STL QL IA: NORMAL
SPECIMEN SOURCE: NORMAL

## 2018-09-11 NOTE — TELEPHONE ENCOUNTER
Letter is printed and put on Dr. Garcia's desk for signature.  Ralph Brown,  [employer]

## 2018-09-11 NOTE — TELEPHONE ENCOUNTER
Letter is signed and faxed to Romina as requested by patient below.  Ralph Brown,  [employer]

## 2018-09-12 NOTE — PROGRESS NOTES
Dear Julianna    Your test results are attached. I am happy to let you know that they are stable.    The test for Helicobacter Pylori was negative and does not show this infection in your stomach.     Please contact me by [employer] if you have any questions about your labs or management.    Jennifer Garcia MD

## 2019-02-15 ENCOUNTER — HEALTH MAINTENANCE LETTER (OUTPATIENT)
Age: 44
End: 2019-02-15

## 2019-06-28 ENCOUNTER — OFFICE VISIT (OUTPATIENT)
Dept: OBGYN | Facility: CLINIC | Age: 44
End: 2019-06-28

## 2019-06-28 VITALS
DIASTOLIC BLOOD PRESSURE: 84 MMHG | SYSTOLIC BLOOD PRESSURE: 157 MMHG | HEART RATE: 99 BPM | BODY MASS INDEX: 31.69 KG/M2 | HEIGHT: 65 IN | WEIGHT: 190.2 LBS

## 2019-06-28 DIAGNOSIS — N64.4 MASTALGIA: Primary | ICD-10-CM

## 2019-06-28 DIAGNOSIS — L91.0 KELOID SCAR: ICD-10-CM

## 2019-06-28 PROCEDURE — 99214 OFFICE O/P EST MOD 30 MIN: CPT | Performed by: OBSTETRICS & GYNECOLOGY

## 2019-06-28 ASSESSMENT — MIFFLIN-ST. JEOR: SCORE: 1529.23

## 2019-06-28 NOTE — PATIENT INSTRUCTIONS
If you have any questions regarding your visit, Please contact your care team.     [facility]: [phone]  [facility] CLINIC HOURS TELEPHONE NUMBER       Gigi Poole M.D.        Griselda-    Yuri Camp-Medical Assistant       Monday-[place]  8:00a.m-4:45 p.m  Tuesday-[place]  9:00a.m-4:00 p.m  Wednesday-[place] 8:00a.m-4:45 p.m.  Thursday-[place]  8:00a.m-4:45 p.m.  Friday-[place]  8:00a.m-4:45 p.m. [facility]  [address] [place] [address]  [phone] ask for [facility]  [phone] Fax  Imaging [phone]    [facility]  [address] [place] [address] [place] [address]  [phone]    [facility]  [address] [place] [address]  [phone] ask [facility]  [phone] Fax  Imaging [phone]     Urgent Care locations:    [facility] Monday-Friday  5 pm - 9 pm  Saturday and Sunday   9 am - 5 pm  Monday-Friday   11 am - 9 pm  Saturday and Sunday   9 am - 5 pm   [phone]   If you need a medication refill, please contact your pharmacy. Please allow 3 business days for your refill to be completed.  As always, Thank you for trusting us with your healthcare needs!

## 2019-06-29 NOTE — PROGRESS NOTES
"OB-GYN Problem-Oriented Visit or Consultation      Julianna Dickey is a 43 year old year old P 2 who presents with a chief complaint of mastalgia.  Referred by self.  Patient's last menstrual period was 06/23/2019.    HPI:     Diffuse bilateral mastalgia without mass or discharge x 2 weeks.     Past myomectomy and reports improved menses and decreased dysmenorrhea. Incision still itches and has tried many topical treatments.     Right shoulder problem improving but now has left rotator cuff symptoms and will have evaluation.     Past medical, obstetrical, surgical, family and social history reviewed and as noted or updated in chart.     Allergies, meds and supplements are as noted or updated in chart.      ROS:   Systems reviewed include:  constitutional, breast, gastrointestinal, genitourinary, musculoskeletal, integumentary, psychological, hematologic/lymphatic and endocrine.    These systems were negative for significant symptoms except for the following additional: none; see HPI.    EXAM:  VS as noted. /84   Pulse 99   Ht 1.652 m (5' 5.04\")   Wt 86.3 kg (190 lb 3.2 oz)   LMP 06/23/2019   BMI 31.61 kg/m    Constitutionally normal.     Nodes, Breasts, Abd were  normal or negative except for, or in particular noting, the following  pertinent findings: keloid scar.      Assessment:   Encounter Diagnoses   Name Primary?     Mastalgia Yes     Keloid scar        I reviewed the condition, causes, differential diagnosis, prognosis, evaluation and management considerations and options.  Questions answered and information given. See orders.         Plan and Recommendations: Diagnostic mammogram and supportive measures- expecting resolution in a short while.   Try silicone sheeting and then may need Derm referral, steroid inj or revision.   Ortho follow-up.     Total encounter time (physician together with patient) = 25min. Direct counseling, education and care coordination time (physician together with patient) = " 15min.       A/P:  Julianna was seen today for breast pain.    Diagnoses and all orders for this visit:    Mastalgia  -     MA Diagnostic Digital Bilateral; Future    Keloid scar        Gigi Poole MD

## 2019-09-27 ENCOUNTER — TELEPHONE (OUTPATIENT)
Dept: FAMILY MEDICINE | Facility: CLINIC | Age: 44
End: 2019-09-27

## 2019-09-27 DIAGNOSIS — R10.13 ABDOMINAL PAIN, EPIGASTRIC: ICD-10-CM

## 2019-09-27 NOTE — TELEPHONE ENCOUNTER
"Requested Prescriptions   Pending Prescriptions Disp Refills     pantoprazole (PROTONIX) 40 MG EC tablet  Last Written Prescription Date:  9/7/18  Last Fill Quantity: 30,  # refills: 3   Last Office Visit with [facility], P or [facility] prescribing provider:  9/7/18   Future Office Visit:      30 tablet 3     Sig: Take 1 tablet (40 mg) by mouth daily Take 30-60 minutes before a meal.       PPI Protocol Failed - 9/27/2019 12:49 PM        Failed - Recent (12 mo) or future (30 days) visit within the authorizing provider's specialty     Patient has had an office visit with the authorizing provider or a provider within the authorizing providers department within the previous 12 mos or has a future within next 30 days. See \"Patient Info\" tab in inbasket, or \"Choose Columns\" in Meds & Orders section of the refill encounter.              Passed - Not on Clopidogrel (unless Pantoprazole ordered)        Passed - No diagnosis of osteoporosis on record        Passed - Medication is active on med list        Passed - Patient is age 18 or older        Passed - No active pregnacy on record        Passed - No positive pregnancy test in past 12 months          "

## 2019-10-01 RX ORDER — PANTOPRAZOLE SODIUM 40 MG/1
40 TABLET, DELAYED RELEASE ORAL DAILY
Qty: 30 TABLET | Refills: 0 | Status: SHIPPED | OUTPATIENT
Start: 2019-10-01 | End: 2020-03-26

## 2019-10-01 NOTE — TELEPHONE ENCOUNTER
Routing refill request to provider for review/approval because:  A break in medication  Patient needs to be seen because it has been more than 1 year since last office visit.  Paula Inman RN

## 2019-10-02 NOTE — TELEPHONE ENCOUNTER
Called, spoke to patient, she is notified and will call back to schedule the appointment, she is working on her insurance issue at this time.  Ralph Brown,  [employer]

## 2020-03-01 ENCOUNTER — HEALTH MAINTENANCE LETTER (OUTPATIENT)
Age: 45
End: 2020-03-01

## 2020-03-26 ENCOUNTER — VIRTUAL VISIT (OUTPATIENT)
Dept: FAMILY MEDICINE | Facility: CLINIC | Age: 45
End: 2020-03-26

## 2020-03-26 DIAGNOSIS — R10.13 ABDOMINAL PAIN, EPIGASTRIC: ICD-10-CM

## 2020-03-26 PROCEDURE — 99213 OFFICE O/P EST LOW 20 MIN: CPT | Mod: TEL | Performed by: FAMILY MEDICINE

## 2020-03-26 RX ORDER — PANTOPRAZOLE SODIUM 40 MG/1
40 TABLET, DELAYED RELEASE ORAL DAILY
Qty: 90 TABLET | Refills: 1 | Status: SHIPPED | OUTPATIENT
Start: 2020-03-26 | End: 2022-01-18 | Stop reason: ALTCHOICE

## 2020-03-26 NOTE — PATIENT INSTRUCTIONS
Patient Education     Epigastric Pain (Uncertain Cause)  Epigastric pain is pain in the upper abdomen. It can be a sign of disease. Common causes include:    Acid reflux (stomach acid flowing up into the esophagus)    Gastritis (irritation of the stomach lining) Most often this is from aspirin or NSAID medicines such as ibuprofen, bacteria called H. pylori, or frequent alcohol use.    Peptic ulcer disease    Inflammation of the pancreas    Gallstone    Infection in the gallbladder  Pain may be dull or burning. It may spread upward to the chest or to the back. There may be other symptoms such as belching, bloating, cramps or hunger pains. There may be weight loss or poor appetite, nausea or vomiting.  Since the cause of your pain is not certain yet,you may need more tests. Sometimes the doctor will treat you for the most likely condition to see if there is improvement before doing more tests.  Home care  Medicines    Antacids help neutralize the normal acids in your stomach.If you don t like the liquid, you can try a chewable one. You may find one works better than another for you. Overuse can cause diarrhea or constipation.    Acid blockers (H2 blockers) decrease acid production. Examples are cimetidine, famotidine, and ranitidine.    Acid inhibitors (PPIs) decrease acid production in a different way than the blockers. You may find they work better, but can take a little longer to take effect.  Examples are omeprazole, lansoprazole, pantoprazole, rabeprazole, and esomeprazole. Many of these are available over-the-counter or available as generics.    Take an antacid 30 to 60 minutes after eating and at bedtime, but not at the same time as an acid blocker.    Try not to take NSAIDs such as ibuprofen. Aspirin may also cause problems, but if taking it for your heart or other medical reasons, talk to your doctor before stopping it; you don't want to cause a worse problem, like a heart attack or stroke.  Diet    If  certain foods seem to cause your pain, try not to eat them. Certain foods can worsen symptoms of gastritis. Limit or avoid fatty, fried, and spicy foods, as well as coffee, chocolate, mint, and foods with high acid content such as tomatoes and citrus fruit and juices (orange, grapefruit, lemon).    Eat slowly and chew food well before swallowing. Symptoms of gastritis can be worsened by certain foods.    Don't drink alcohol. It can irritate the stomach.    Don't consume caffeine, or use tobacco. These can delay healing and worsen your problem.    Try eating smaller meals with snacks in between.    Keep an empty stomach for 2 to 3 hours before lying down.    Prop the head of the bed up if you have overnight symptoms. This helps acid clear from your esophagus.    Follow-up care  Follow up with your healthcare provider or as advised.  When to seek medical advice  Call your healthcare provider right away if any of the following occur:    Stomach pain worsens or moves to the right lower part of the abdomen    Chest pain appears, or if it worsens or spreads to the chest, back, neck, shoulder, or arm    Frequent vomiting (can t keep down liquids)    Blood in the stool or vomit (red or black color)    Feeling weak or dizzy, fainting, or having trouble breathing    Fever of 100.4 F (38 C) or higher, or as directed by your healthcare provider    Abdominal swelling  Date Last Reviewed: 3/1/2018    [phone] [employer]. [address] [place], [place] [address]. All rights reserved. This information is not intended as a substitute for professional medical care. Always follow your healthcare professional's instructions.           Patient Education     GERD (Adult)    The esophagus is a tube that carries food from the mouth to the stomach. A valve (the LES, lower esophageal sphincter) at the lower end of the esophagus prevents stomach acid from flowing upward. When this valve doesn't work properly, stomach contents  "may repeatedly flow back up (reflux) into the esophagus. This is called gastroesophageal reflux disease (GERD). GERD can irritate the esophagus. It can cause problems with pain, swallowing or breathing. In severe cases, GERD can cause recurrent pneumonia (from aspiration or breathing in particles) or other serious problems.  Symptoms of reflux include burning, pressure or sharp pain in the upper abdomen or mid to lower chest. The pain can spread to the neck, back, or shoulder. There may be belching, an acid taste in the back of the throat, chronic cough, or sore throat, or hoarseness. GERD symptoms often occur during the day after a big meal. They can also occur at night when lying down.   Home care  Lifestyle changes can help reduce symptoms. If needed, your healthcare provider may prescribe medicines. Symptoms often improve with treatment, but if treatment is stopped, the symptoms often return after a few months. So most persons with GERD will need to continue treatment or get treatment on and off.  Lifestyle changes    Limit or avoid fatty, fried, and spicy foods, as well as coffee, chocolate, mint, and foods with high acid content such as tomatoes and citrus fruit and juices (orange, grapefruit, lemon).    Don t eat large meals, especially at night. Frequent, smaller meals are best. Don't lie down right after eating. And don t eat anything 3 hours before going to bed.    Don't drink alcohol or smoke. As much as possible, stay away from second hand smoke.    If you are overweight, losing weight will reduce symptoms.     Don't wear tight clothing around your stomach area.    If your symptoms occur during sleep, use a foam wedge to elevate your upper body (not just your head.) Or, place 4\" blocks under the head of your bed. Or use 2 bed risers under your bedframe.  Medicines  If needed, medicines can help relieve the symptoms of GERD and prevent damage to the esophagus. Discuss a medicine plan with your healthcare " provider. This may include one or more of the following medicines:    Antacids to help neutralize the normal acids in your stomach.    Acid blockers (Histamine or H2 blockers) to decrease acid production.    Acid inhibitors (proton pump inhibitors PPIs) to decrease acid production in a different way than the blockers. They may work better, but can take a little longer to take effect.  Take an antacid 30 to 60 minutes after eating and at bedtime, but not at the same time as an acid blocker.  Try not to take medicines such as ibuprofen and aspirin. If you are taking aspirin for your heart or other medical reasons, talk to your healthcare provider about stopping it.  Follow-up care  Follow up with your healthcare provider or as advised by our staff.  When to seek medical advice  Call your healthcare provider if any of the following occur:    Stomach pain gets worse or moves to the lower right abdomen (appendix area)    Chest pain appears or gets worse, or spreads to the back, neck, shoulder, or arm    An over-the-counter trial of medicine doesn't relieve your symptoms    Weight loss that can't be explained    Trouble or pain swallowing    Frequent vomiting (can t keep down liquids)    Blood in the stool or vomit (red or black in color)    Feeling weak or dizzy    Fever of 100.4 F (38 C) or higher, or as directed by your healthcare provider  Date Last Reviewed: 3/1/2018    [id number] [employer]. [address] [place] [address], [place] [address]. All rights reserved. This information is not intended as a substitute for professional medical care. Always follow your healthcare professional's instructions.

## 2020-03-26 NOTE — PROGRESS NOTES
"Julianna Dickey is a 44 year old female who is being evaluated via a billable telephone visit.  [phone]    The patient has been notified of following:     \"This telephone visit will be conducted via a call between you and your physician/provider. We have found that certain health care needs can be provided without the need for a physical exam.  This service lets us provide the care you need with a short phone conversation.  If a prescription is necessary we can send it directly to your pharmacy.  If lab work is needed we can place an order for that and you can then stop by our lab to have the test done at a later time.    If during the course of the call the physician/provider feels a telephone visit is not appropriate, you will not be charged for this service.\"     Julianna Dickey complains of   Chief Complaint   Patient presents with     Recheck Medication     requesting refill on pantoprazole       I have reviewed and updated the patient's Past Medical History, Social History, Family History and Medication List.    ALLERGIES  Patient has no known allergies.    GERD/Heartburn      Duration: several weeks    Description (location/character/radiation): heart burn, reflux and pain after eating    Intensity:  moderate    Accompanying signs and symptoms:  food getting stuck: no   nausea/vomiting/blood: no   abdominal pain: YES  black/tarry or bloody stools: no :    History (similar episodes/previous evaluation): Yes and Protonix helped in the past. Scheduled to get an ultrasound and upper GI exam but did not have insurance. Now has insurance but office visits are restricted.     Precipitating or alleviating factors:  worse with fatty foods and spicy foods.  current NSAID/Aspirin use: no     Therapies tried and outcome: Protonix and medication helpful         Assessment/Plan:  1. Abdominal pain, epigastric  Will restart proton pump inhibitor at this time. Recommend further testing due to severity and persistence of symptoms. " Will wait until COVID 19 pandemic eases up and risk of office visits is less, unless symptoms get worse or do not respond to treatment.   - pantoprazole (PROTONIX) 40 MG EC tablet; Take 1 tablet (40 mg) by mouth daily Take 30-60 minutes before a meal.  Dispense: 90 tablet; Refill: 1    Phone call duration:  8 minutes    Jennifer Garcia MD

## 2020-05-20 ENCOUNTER — VIRTUAL VISIT (OUTPATIENT)
Dept: FAMILY MEDICINE | Facility: OTHER | Age: 45
End: 2020-05-20

## 2020-05-20 NOTE — PROGRESS NOTES
"Date: 2020 08:40:58  Clinician: Maricel Bazzi  Clinician NPI: [id number]  Patient: Julianna Ayi  Patient : 1975  Patient Address: [address] [place] [address]  Patient Phone: [phone]  Visit Protocol: URI  Patient Summary:  Julianna is a 44 year old ( : 1975 ) female who initiated a Visit for COVID-19 (Coronavirus) evaluation and screening. When asked the question \"Please sign me up to receive news, health information and promotions. \", Julianna responded \"No\".    Julianna states her symptoms started 1-2 days ago.   Her symptoms consist of ageusia, diarrhea, facial pain or pressure, a sore throat, a cough, nasal congestion, rhinitis, malaise, and anosmia.   Symptom details     Nasal secretions: The color of her mucus is yellow.    Cough: Julianna coughs a few times an hour and her cough is not more bothersome at night. Phlegm does not come into her throat when she coughs. She does not believe her cough is caused by post-nasal drip.     Sore throat: Julianna reports having moderate throat pain (4-6 on a 10 point pain scale), does not have exudate on her tonsils, and can swallow liquids. The lymph nodes in her neck are not enlarged. A rash has not appeared on the skin since the sore throat started.     Facial pain or pressure: The facial pain or pressure feels worse when bending over or leaning forward.      Julianna denies having nausea, teeth pain, chills, wheezing, enlarged lymph nodes, fever, vomiting, ear pain, headache, and myalgias. She also denies having recent facial or sinus surgery in the past 60 days, taking antibiotic medication for the symptoms, and having a sinus infection within the past year. She is not experiencing dyspnea.   Precipitating events  Julianna is not sure if she has been exposed to someone with strep throat. She has not recently been exposed to someone with influenza. Julianna has been in close contact with the following high risk individuals: people with asthma, heart " disease or diabetes.   Pertinent COVID-19 (Coronavirus) information  In the past 14 days, Julianna has worked in a congregate living setting.   She either works or volunteers as a healthcare worker or a , or works or volunteers in a healthcare facility. 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   Julianna has not lived in a congregate living setting in the past 14 days. She lives with a healthcare worker.   Julianna has not had a close contact with a laboratory-confirmed COVID-19 patient within 14 days of symptom onset.   Pertinent medical history  Julianna does not get yeast infections when she takes antibiotics.   Julianna needs a return to work/school note.   Weight: 193 lbs   Julianna does not smoke or use smokeless tobacco.   She denies pregnancy and denies breastfeeding. She has menstruated in the past month.   Weight: 193 lbs    MEDICATIONS: No current medications, ALLERGIES: NKDA  Clinician Response:  Dear Julianna,   Dear Julianna  Your symptoms show that you may have coronavirus (COVID-19). This illness can cause fever, cough and trouble breathing. Many people get a mild case and get better on their own. Some people can get very sick.  What should I do?  We would like to test you for this virus. This will be a curbside test done outside the clinic.  Please call [phone] to schedule your visit. Explain that you were referred by [facility] to have a COVID-19 test. Be ready to share your [facility] visit ID number.  Starting now:  Stay at least 6 feet away from others. (If someone will drive you to your test, stay in the backseat, as far away from the  as you can.)   Don't go to work, school or anywhere else. When it's time for your test, go straight to the testing site. Don't make any stops on the way there or back.   Wash your hands and face often. Use soap and water.   " Cover your mouth and nose with a mask, tissue or washcloth.   Don't touch anyone. No hugging, kissing or handshakes.  While at home   Stay home and away from others (self-isolate) until:  You've had no fever---and no medicine that reduces fever---for 3 full days (72 hours). And...  Your other symptoms have gotten better. For example, your cough or breathing has improved. And...  At least 10 days have passed since your symptoms started.  During this time:  Stay in your own room (and use your own bathroom), if you can.  Don't go to work, school or anywhere else.  Stay away from others in your home. No hugging, kissing or shaking hands.  Don't let anyone visit.  Cover your mouth and nose with a mask, tissue or washcloth to avoid spreading germs.  Clean \"high touch\" surfaces often (doorknobs, counters, handles, etc.). Use a household cleaning spray or wipes.  Wash your hands and face often. Use soap and water.  How can I take care of myself?  1. Get lots of rest. Drink extra fluids (unless your doctor has told you not to).  2. Take Tylenol (acetaminophen) for fever or pain. If you have liver or kidney problems, ask your family doctor if it's okay to take Tylenol.  Adults can take either:   650 mg (two 325 mg pills) every 4 to 6 hours, or...  1,000 mg (two 500 mg pills) every 8 hours as needed.   Note: Don't take more than 3,000 mg in one day.   Acetaminophen is found in many medicines (both prescribed and over-the-counter medicines). Read all labels to be sure you don't take too much.   For children, check the Tylenol bottle for the right dose. The dose is based on the child's age or weight.  3. If you have other health problems (like cancer, heart failure, an organ transplant or severe kidney disease): Call your specialty clinic if you don't feel better in the next 2 days.  4. Know when to call 911: If your breathing is so bad that it keeps you from doing normal activities, call 911 or go to the emergency room. Tell " them that you've been staying home and may have COVID-19.  5. Sign up for [employer]. We know it's scary to hear that you might have COVID-19. We want to track your symptoms to make sure you're okay over the next 2 weeks. Please look for an email from [employer]---this is a free, online program that we'll use to keep in touch. To sign up, follow the link in the email. Learn more at [email or web address].  6. The following will serve as your written order for this Covid Test ordered by me for the indication of suspected Covid [Z20.828]: The test will be ordered in [employer], our electronic health record after you are scheduled and will show as ordered and authorized by Gamaliel Sibley MD   Order: Covid-19 (Coronavirus) PCR [facility]IC [facility]  Where can I get more information?  To learn more about COVID-19 and how to care for yourself at home, please visit the CDC website at https://www.cdc.gov/coronavirus/2019-ncov/about/steps-when-sick.html.  For more about your care at [facility], please visit [email or web address]  If you'd like to be part of a COVID-19 clinical trial (research study) at the [facility], go to [email or web address] for details.    Diagnosis: Cough  Diagnosis ICD: R05

## 2020-05-21 ENCOUNTER — OFFICE VISIT (OUTPATIENT)
Dept: URGENT CARE | Facility: URGENT CARE | Age: 45
End: 2020-05-21
Payer: COMMERCIAL

## 2020-05-21 DIAGNOSIS — Z20.822 SUSPECTED COVID-19 VIRUS INFECTION: Primary | ICD-10-CM

## 2020-05-21 PROCEDURE — 99000 SPECIMEN HANDLING OFFICE-LAB: CPT | Performed by: FAMILY MEDICINE

## 2020-05-21 PROCEDURE — 87635 SARS-COV-2 COVID-19 AMP PRB: CPT | Mod: 90 | Performed by: FAMILY MEDICINE

## 2020-05-22 ENCOUNTER — TELEPHONE (OUTPATIENT)
Dept: URGENT CARE | Facility: URGENT CARE | Age: 45
End: 2020-05-22

## 2020-05-22 LAB
SARS-COV-2 RNA SPEC QL NAA+PROBE: ABNORMAL
SPECIMEN SOURCE: ABNORMAL

## 2020-05-22 NOTE — LETTER
May 22, 2020        Julianna Dickey  [address] [place] [facility] [address]    This letter provides a written record that you were tested for COVID-19 on 5/21/2020.     Your result was positive.     This means that we found the virus that causes COVID-19 in your sample.    How can I protect others?    For safety, it s very important to follow these rules.    First, stay home and away from others (self-isolate) until:      You ve had no fever--and no medicine that reduces fever--for 3 full days (72 hours). And      Your other symptoms have gotten better. For example, your cough or breathing has improved. And     At least 10 days have passed since your symptoms started.    During this time:    Stay in your own room (and use your own bathroom), if you can.    Stay away from others in your home. No hugging, kissing or shaking hands.    Don t let anyone visit.    Don t go to work, school or anywhere else.     Clean  high touch  surfaces often (doorknobs, counters, handles, etc.). Use a household cleaning spray or wipes.    Cover your mouth and nose with a mask, tissue or washcloth to avoid spreading germs.    Wash your hands and face often with soap and water.    You should not go back to work until you meet the guidelines above for ending your home isolation. You should meet these along with any other guidelines that your employer has.    Employers: This document serves as formal notice of your employee s medical guidelines for going back to work. They must meet the above guidelines before going back to work in person.    How can I take care of myself?    1. Get lots of rest. Drink extra fluids (unless a doctor has told you not to).      2. Take Tylenol (acetaminophen) for fever or pain. If you have liver or kidney problems, ask your family doctor if it s okay to take Tylenol.     Take either:     650 mg (two 325 mg pills) every 4 to 6 hours, or     1,000 mg (two 500 mg pills) every 8 hours as needed.     Note: Don t  take more than 3,000 mg in one day. Acetaminophen is found in many medicines (both prescribed and over-the-counter medicines). Read all labels to be sure you don t take too much.    For children, check the Tylenol bottle for the right dose. The dose is based on the child s age or weight.    3. If you have other health problems (like cancer, heart failure, an organ transplant or severe kidney disease): Call your specialty clinic if you don t feel better in the next 2 days.    4. Know when to call 911: If your breathing is so bad that it keeps you from doing normal activities, call 911 or go to the emergency room. Tell them that you ve been staying home and may have COVID-19.    5. Sign up for [employer] We know it s scary to hear that you have COVID-19. We want to track your symptoms to make sure you re okay over the next 2 weeks. Please look for an email from [employer]--this is a free, online program that we ll use to keep in touch. To sign up, follow the link in the email. Learn more at [email or web address].    6. Interested is participating in research? Visit the link below to view current clinical trials that apply to your situation:  [email or web address]      Where can I get more information?    To learn the [facility] guidelines for staying home, please visit the [facility] website at [email or web address].state.[place]/diseases/coronavirus/basics.html.    To learn more about COVID-19 and how to care for yourself at home, please visit the CDC website at https://www.cdc.gov/coronavirus/2019-ncov/about/steps-when-sick.html.    For more options for care at [facility], please visit our website at [email or web address]

## 2020-05-22 NOTE — TELEPHONE ENCOUNTER
"Coronavirus (COVID-19) Notification    Patient  Julianna Dickey    Reason for call  Notify of Positive Coronavirus (COVID-19) lab results, assess symptoms,  review [facility] recommendations    Lab Result    Lab test:  2019-nCoV rRt-PCR or SARS-CoV-2 PCR    Oropharyngeal AND/OR nasopharyngeal swabs is POSITIVE for 2019-nCoV RNA/SARS-COV-2 PCR (COVID-19 virus)    RN Recommendations/Instructions per [facility] Coronavirus COVID-19 recommendations    Brief introduction script  Hi, My name is Ekaterina and I am calling on behalf of [facility].  We were notified that your Coronavirus test (COVID-19) for was POSITIVE for the virus.  I have some information to relay to you but first I wanted to mention that the [place] Dept of Health will be contacting you shortly [it's possible MD already called Patient] to talk to you more about how you are feeling and other people you have had contact with who might now also have the virus.  Also, [facility] is Partnering with the [facility] for Covid-19 research, you may be contacted directly by research staff.    Assessment (Inquire about Patient's current symptoms)  Julianna advised of result.  \"Started with a cold and sinus pressure on my face\", started one week ago.  No fever.  Recommendations reviewed.            If at time of call, Patients symptoms hare worsened, the Patient should contact 911 or have someone drive them to Emergency Dept promptly:      If Patient calling 911, inform 911 personal that you have tested positive for the Coronavirus (COVID-19).  Place mask on and await 911 to arrive.    If Emergency Dept, If possible, please have another adult drive you to the Emergency Dept but you need to wear mask when in contact with other people.      Review information with Patient    Since you tested POSITIVE for the COVID-19 virus, it is important that you protect others from being exposed and infected with this virus.    [For safety, it's very important to follow " these rules.]    First, stay home and away from others (self-isolate) until:    You've had no fever--and no medicine that reduces fever--for 3 full days (72 hours). And      Your other symptoms have gotten better. For example, your cough or breathing has improved. And     At least 10 days have passed since your symptoms started.    During this time:    Stay in your own room (and use your own bathroom), if you can.    Stay away from others in your home. No hugging, kissing or shaking hands.    Don't let anyone visit.    Don't go to work, school or anywhere else.     Clean  high touch  surfaces often (doorknobs, counters, handles, etc.). Use a household cleaning spray or wipes.    Cover your mouth and nose with a mask, tissue or washcloth to avoid spreading germs.    Wash your hands and face often with soap and water.    You should not go back to work until you meet the guidelines above for ending your home isolation. You should meet these along with any other guidelines that your employer has.  Employers: This document serves as formal notice of your employee's medical guidelines for going back to work. They must meet the above guidelines before going back to work in person.      How can I take care of myself?  1. Get lots of rest. Drink extra fluids (unless a doctor has told you not to).    2. Take Tylenol (acetaminophen) for fever or pain. If you have liver or kidney problems, ask your family doctor if it's okay to take Tylenol.     Take either:     650 mg (two 325 mg pills) every 4 to 6 hours, or     1,000 mg (two 500 mg pills) every 8 hours as needed.     Note: Don't take more than 3,000 mg in one day. Acetaminophen is found in many medicines (both prescribed and over-the-counter medicines). Read all labels to be sure you don't take too much.  For children, check the Tylenol bottle for the right dose. The dose is based on the child's age or weight.  3. If you have other health problems (like cancer, heart  failure, an organ transplant or severe kidney disease): Call your specialty clinic if you don't feel better in the next 2 days.    4. Know when to call 911: If your breathing is so bad that it keeps you from doing normal activities, call 911 or go to the emergency room. Tell them that you've been staying home and may have COVID-19.    5. Sign up for [employer]. We know it's scary to hear that you have COVID-19. We want to track your symptoms to make sure you're okay over the next 2 weeks. Please look for an email from [employer]--this is a free, online program that we'll use to keep in touch. To sign up, follow the link in the email. Learn more at [email or web address].      Where can I get more information?    To learn the [place]'s guidelines for staying home, please visit the [facility] of Health website at [email or web address]://www.health.[facility].[place]/diseases/coronavirus/basics.html.    To learn more about COVID-19 and how to care for yourself at home, please visit the CDC website at https://www.cdc.gov/coronavirus/2019-ncov/about/steps-when-sick.html.    For more options for care at [facility] [place], please visit our website at [email or web address]      [place] Dept of Health ([facility]) COVID-19 Hotline:   [phone]    Positive COVID-19 letter sent (Yes/No):  YES      Ekaterina Urban, RN    [facility]   Lung Nodule and ED Lab Results F/U RN  Epic pool (ED late result f/u RN) : P [id number]   # [phone]

## 2020-09-07 ENCOUNTER — OFFICE VISIT (OUTPATIENT)
Dept: URGENT CARE | Facility: URGENT CARE | Age: 45
End: 2020-09-07
Payer: COMMERCIAL

## 2020-09-07 ENCOUNTER — NURSE TRIAGE (OUTPATIENT)
Dept: NURSING | Facility: CLINIC | Age: 45
End: 2020-09-07

## 2020-09-07 VITALS
WEIGHT: 191.4 LBS | BODY MASS INDEX: 31.81 KG/M2 | TEMPERATURE: 98.7 F | OXYGEN SATURATION: 100 % | SYSTOLIC BLOOD PRESSURE: 143 MMHG | HEART RATE: 73 BPM | DIASTOLIC BLOOD PRESSURE: 86 MMHG | RESPIRATION RATE: 16 BRPM

## 2020-09-07 DIAGNOSIS — R51.9 NONINTRACTABLE EPISODIC HEADACHE, UNSPECIFIED HEADACHE TYPE: ICD-10-CM

## 2020-09-07 DIAGNOSIS — S06.0X0A CONCUSSION WITHOUT LOSS OF CONSCIOUSNESS, INITIAL ENCOUNTER: Primary | ICD-10-CM

## 2020-09-07 PROCEDURE — 99214 OFFICE O/P EST MOD 30 MIN: CPT | Performed by: NURSE PRACTITIONER

## 2020-09-07 NOTE — PROGRESS NOTES
SUBJECTIVE:   Julianna Dickey is a 45 year old female presenting with a chief complaint of headache for the last week.  Last week she was getting into her car and the door came back and hit her in the head which led to an instant headache.    Onset of symptoms was 7 day(s) ago.  Course of illness is waxing and waning  Severity severe  Previous Treatment tylenol 500mg about once a day.      Past Medical History:   Diagnosis Date     Fibroid 2017    resolved     Sickle cell trait (H)      Current Outpatient Medications   Medication Sig Dispense Refill     pantoprazole (PROTONIX) 40 MG EC tablet Take 1 tablet (40 mg) by mouth daily Take 30-60 minutes before a meal. 90 tablet 1     Social History     Tobacco Use     Smoking status: Never Smoker     Smokeless tobacco: Never Used   Substance Use Topics     Alcohol use: Yes     Comment: social, occasionally     History reviewed. No pertinent family history.     ROS: 10 point ROS neg other than the symptoms noted above in the HPI.     OBJECTIVE  BP (!) 143/86   Pulse 73   Temp 98.7  F (37.1  C) (Tympanic)   Resp 16   Wt 86.8 kg (191 lb 6.4 oz)   SpO2 100%   BMI 31.81 kg/m        GENERAL APPEARANCE: healthy appearing, alert     EYES: PERRL, EOMI, sclera non-icteric     HENT: oral exam benign, mucus membranes intact, without ulcers or lesions. She does have a tiny bit of edema in the post auricular area and is tender here as well     NECK: no adenopathy or asymmetry, thyroid normal to palpation     RESP: lungs clear to auscultation - no rales, rhonchi or wheezes     CV: regular rates and rhythm, no murmurs, rubs, or gallop     ABDOMEN:  soft, nontender, no HSM or masses and bowel sounds normal     MS: extremities normal- no gross deformities noted; normal muscle tone.     SKIN: no suspicious lesions or rashes     NEURO: Normal strength and tone, mentation intact and speech normal     PSYCH: normal thought process; no significant mood disturbance    ASSESSMENT/PLAN:       "ICD-10-CM    1. Concussion without loss of consciousness, initial encounter  S06.0X0A Patient to see primary care provider at the end of the week for another evaluation. May consider referral to  Concussion clinic or facial /head CT at that time.   2. Nonintractable episodic headache, unspecified headache type  R51 Tylenol, Ice, brain rest, one week off from work.        Follow Up:      Patient Instructions   Take Tylenol 1000mg every 6 hors for pain, Use Ice to the left ear, face as needed.  Patient Education   Concussion Discharge Instructions  You were seen today for signs of a concussion. The symptoms will vary, depending on the nature of your injury and your health. You may have: headache, confusion, nausea (feel sick to your stomach), vomiting (throwing up) and problems with memory, concentrating or sleep. You may feel dizzy, irritable, and tired.   Children and teens may need help from their parents, teachers and coaches to watch for symptoms as they recover.  Follow-up  It is important for you to see a doctor for follow-up care to see how you are recovering. Please see your primary doctor within the next 5 to 7 days. You may have also been referred to the Concussion  service. They will contact you and arrange a follow-up visit if needed. If you need help sooner, you may call them at [phone].  Warning signs  Call your doctor or come back to Emergency if you suddenly have any of these symptoms:    Headaches that get worse    Feeling more and more drowsy    You keep repeating yourself    Strange behavior    Seizures    Repeat vomiting (throwing up)    Trouble walking    Growing confusion    Feeling more irritable    Neck pain that gets worse    Slurred speech    Weakness or numbness    Loss of consciousness    Fluid or blood coming from ears or nose  Self-care    Get lots of rest and get enough sleep at night. Take daytime naps or rest if you feel tired.    Limit physical activity and \"thinking\" " activities. These can make symptoms worse.  ? Physical activity includes gym, sports, weight training, running, exercise and heavy lifting.  ? Thinking activities include homework, class work, job-related work and screen time (phone, computer, tablet, TV and video games).    Stick to a healthy diet and drink lots of fluids.    As symptoms improve, you may slowly return to your daily activities. If symptoms get worse   or return, reduce your activity.    Know that it is normal to feel sad and frustrated when you do not feel right and are less active.  Going back to work    Your care team will tell you when you are ready to return to work.    Limit the amount of work you do soon after your injury. This may speed healing. Take breaks if your symptoms get worse. You should also reduce your physical activity as well as activities that require a lot of thinking until you see your doctor.    You may need shorter work days and a lighter workload.    Avoid heavy lifting, working with machinery, driving and working at heights until your symptoms are gone or you are cleared by a doctor.  Returning to sports    Never return to play if you have any symptoms. A full recovery will reduce the chances of getting hurt again. Remember, it is better to miss one or two games than a whole season.    You should rest from all physical activity until you see your doctor. Generally, if all symptoms have completely cleared, your doctor can help guide you to slowly return to sports. If symptoms return or worsen, stop the activity and see your doctor.    Important: If you are in an organized sport and under age 18, you will need written consent from a healthcare provider before you return to sports. Typically, this will be your primary care or sports medicine doctor. Please make an appointment.  Going back to school    If you are still having symptoms, you may need extra help at school.    Tell your teachers and school nurse about your injury  "and symptoms. Ask them to watch for problems with learning, memory and concentrating. Symptoms may get worse when you do schoolwork, and you may become more irritable.    You may need shorter school days, a reduced workload, and to postpone testing.    Do not drive or take gym class (physical activity) until cleared by a doctor.    For informational purposes only. Not to replace the advice of your health care provider.   2009 [employer]. Used with permission. This form is adapted from the \"Heads Up: Brain Injury in Your Practice\" tool kit developed by the Centers for Disease Control and Prevention (CDC). All rights reserved. [facility]. [employer] [id number] - Rev 03/17.           STEPHANIA Menezes, CNP  [facility] Provider  "

## 2020-09-07 NOTE — PATIENT INSTRUCTIONS
"Take Tylenol 1000mg every 6 hors for pain, Use Ice to the left ear, face as needed.  Patient Education   Concussion Discharge Instructions  You were seen today for signs of a concussion. The symptoms will vary, depending on the nature of your injury and your health. You may have: headache, confusion, nausea (feel sick to your stomach), vomiting (throwing up) and problems with memory, concentrating or sleep. You may feel dizzy, irritable, and tired.   Children and teens may need help from their parents, teachers and coaches to watch for symptoms as they recover.  Follow-up  It is important for you to see a doctor for follow-up care to see how you are recovering. Please see your primary doctor within the next 5 to 7 days. You may have also been referred to the Concussion  service. They will contact you and arrange a follow-up visit if needed. If you need help sooner, you may call them at [phone].  Warning signs  Call your doctor or come back to Emergency if you suddenly have any of these symptoms:    Headaches that get worse    Feeling more and more drowsy    You keep repeating yourself    Strange behavior    Seizures    Repeat vomiting (throwing up)    Trouble walking    Growing confusion    Feeling more irritable    Neck pain that gets worse    Slurred speech    Weakness or numbness    Loss of consciousness    Fluid or blood coming from ears or nose  Self-care    Get lots of rest and get enough sleep at night. Take daytime naps or rest if you feel tired.    Limit physical activity and \"thinking\" activities. These can make symptoms worse.  ? Physical activity includes gym, sports, weight training, running, exercise and heavy lifting.  ? Thinking activities include homework, class work, job-related work and screen time (phone, computer, tablet, TV and video games).    Stick to a healthy diet and drink lots of fluids.    As symptoms improve, you may slowly return to your daily activities. If symptoms get " worse   or return, reduce your activity.    Know that it is normal to feel sad and frustrated when you do not feel right and are less active.  Going back to work    Your care team will tell you when you are ready to return to work.    Limit the amount of work you do soon after your injury. This may speed healing. Take breaks if your symptoms get worse. You should also reduce your physical activity as well as activities that require a lot of thinking until you see your doctor.    You may need shorter work days and a lighter workload.    Avoid heavy lifting, working with machinery, driving and working at heights until your symptoms are gone or you are cleared by a doctor.  Returning to sports    Never return to play if you have any symptoms. A full recovery will reduce the chances of getting hurt again. Remember, it is better to miss one or two games than a whole season.    You should rest from all physical activity until you see your doctor. Generally, if all symptoms have completely cleared, your doctor can help guide you to slowly return to sports. If symptoms return or worsen, stop the activity and see your doctor.    Important: If you are in an organized sport and under age 18, you will need written consent from a healthcare provider before you return to sports. Typically, this will be your primary care or sports medicine doctor. Please make an appointment.  Going back to school    If you are still having symptoms, you may need extra help at school.    Tell your teachers and school nurse about your injury and symptoms. Ask them to watch for problems with learning, memory and concentrating. Symptoms may get worse when you do schoolwork, and you may become more irritable.    You may need shorter school days, a reduced workload, and to postpone testing.    Do not drive or take gym class (physical activity) until cleared by a doctor.    For informational purposes only. Not to replace the advice of your health care  "provider.   2009 [employer]. Used with permission. This form is adapted from the \"Heads Up: Brain Injury in Your Practice\" tool kit developed by the Centers for Disease Control and Prevention (CDC). All rights reserved. [employer]. [employer] [id number] - Rev 03/17.       "

## 2020-09-07 NOTE — LETTER
September 7, 2020      Julianna Dickey  [address] [place] [address]        To Whom It May Concern:    Julianna Dickey  was seen on 9/7/2020.  Please excuse her  until 9/14/2020 due to injury.        Sincerely,        Stephanie Bruce, CNP

## 2020-09-07 NOTE — TELEPHONE ENCOUNTER
"\"Last week I was getting in my car, I was bending over and the wind blew the car door shut on my ear and side of my head. It was like a blow to the head. I took Tylenol and thought it would go away but I still have a headache and ear pain. It comes and goes, when it's bad it's an 8/10. I can hear ok. At times I get dizzy.\"  Denies other sx  Triaged, offered UC or ER  Vianey Goodman RN [facility]    COVID 19 Nurse Triage Plan/Patient Instructions    Please be aware that novel coronavirus (COVID-19) may be circulating in the community. If you develop symptoms such as fever, cough, or SOB or if you have concerns about the presence of another infection including coronavirus (COVID-19), please contact your health care provider or visit [email or web address].     Disposition/Instructions    In-Person Visit with provider recommended. Reference Visit Selection Guide.    Thank you for taking steps to prevent the spread of this virus.  o Limit your contact with others.  o Wear a simple mask to cover your cough.  o Wash your hands well and often.    Resources    [facility]: About COVID-19: [email or web address]    CDC: What to Do If You're Sick: www.cdc.gov/coronavirus/2019-ncov/about/steps-when-sick.html    CDC: Ending Home Isolation: www.cdc.gov/coronavirus/2019-ncov/hcp/disposition-in-home-patients.html     CDC: Caring for Someone: www.cdc.gov/coronavirus/2019-ncov/if-you-are-sick/care-for-someone.html     [facility]: Interim Guidance for Hospital Discharge to Home: www.health.[facility].[place]/diseases/coronavirus/hcp/hospdischarge.pdf    [facility] clinical trials (COVID-19 research studies): clinicalaffairs.[facility]/umn-clinical-trials     Below are the COVID-19 hotlines at the [place] Department of Health ([facility]). Interpreters are available.   o For health questions: Call 031-240-5465 or 1-865.538.4923 (7 a.m. to 7 p.m.)  o For questions about schools and childcare: Call 569-110-2840 or " 1-889.881.3705 (7 a.m. to 7 p.m.)                     Additional Information    Negative: [1] SEVERE pain AND [2] not improved 2 hours after pain medicine    Negative: [1] Pointed object was inserted into the ear canal AND [2] now has bleeding or earache     (Exception: doctor's ear exam)    Negative: [1] Cotton swab (e.g., Q-tip) inserted with force AND [2] pain persists > 30 minutes    Negative: [1] Bleeding recurs AND [2] from cotton swab or other minor trauma    Negative: [1] Direct blow to area (e.g., ball, slapped hard) AND [2] bleeding from ear canal    Negative: Hearing is decreased on injured side    Negative: Ear looks swollen    Negative: [1] No prior tetanus shots (or is not fully vaccinated) AND [2] any wound (e.g., cut, scrape)    Negative: [1] Last tetanus shot > 5 years ago AND [2] DIRTY cut or scrape    Negative: [1] Last tetanus shot > 10 years ago AND [2] CLEAN cut or scrape (e.g., object AND skin were clean)    Direct blow to area (e.g., baseball, punched)    Protocols used: EAR INJURY-A-AH

## 2020-09-09 ENCOUNTER — VIRTUAL VISIT (OUTPATIENT)
Dept: FAMILY MEDICINE | Facility: CLINIC | Age: 45
End: 2020-09-09
Payer: COMMERCIAL

## 2020-09-09 DIAGNOSIS — S06.0X0A CONCUSSION WITHOUT LOSS OF CONSCIOUSNESS, INITIAL ENCOUNTER: Primary | ICD-10-CM

## 2020-09-09 DIAGNOSIS — M62.838 NECK MUSCLE SPASM: ICD-10-CM

## 2020-09-09 PROCEDURE — 99213 OFFICE O/P EST LOW 20 MIN: CPT | Mod: 95 | Performed by: FAMILY MEDICINE

## 2020-09-09 NOTE — PROGRESS NOTES
"Julianna Dickey is a 45 year old female who is being evaluated via a billable telephone visit.      The patient has been notified of following:     \"This telephone visit will be conducted via a call between you and your physician/provider. We have found that certain health care needs can be provided without the need for a physical exam.  This service lets us provide the care you need with a short phone conversation.  If a prescription is necessary we can send it directly to your pharmacy.  If lab work is needed we can place an order for that and you can then stop by our lab to have the test done at a later time.    Telephone visits are billed at different rates depending on your insurance coverage. During this emergency period, for some insurers they may be billed the same as an in-person visit.  Please reach out to your insurance provider with any questions.    If during the course of the call the physician/provider feels a telephone visit is not appropriate, you will not be charged for this service.\"    Patient has given verbal consent for Telephone visit?  Yes    What phone number would you like to be contacted at? See appt notes    How would you like to obtain your AVS? Carmen Valle     Julianna Dickey is a 45 year old female who presents via phone visit today for the following health issues:    HPI    On 8/31/2020 was rushing to work and top part of car door hit her in the earlobe.  She went to work and finished the day. Had worsening headaches not responding to tylenol 500 mg daily after a week.    Still having headaches in left > right temple and ear tragus.  She is trying to minimize her screens and is home the rest of the week.  Pain described as \"trickle\" but not sharp. Denies hearing changes.  Maybe the television is a little blurry.    Having some chronic neck and upper back spasms as well       Review of Systems   Constitutional, HEENT, cardiovascular, pulmonary, gi and gu systems are negative, except as " otherwise noted.       Objective          Vitals:  No vitals were obtained today due to virtual visit.    healthy, alert and no distress  PSYCH: Alert and oriented times 3; coherent speech, normal   rate and volume, able to articulate logical thoughts, able   to abstract reason, no tangential thoughts, no hallucinations   or delusions  Her affect is normal  RESP: No cough, no audible wheezing, able to talk in full sentences  Remainder of exam unable to be completed due to telephone visits    No results found for this or any previous visit (from the past 24 hour(s)).        Assessment/Plan:    Assessment & Plan     Concussion without loss of consciousness, initial encounter  Trial of tizanidine and referral to concussion clinic  - CONCUSSION  REFERRAL  - tiZANidine (ZANAFLEX) 4 MG tablet; Take 1 tablet (4 mg) by mouth 3 times daily as needed for muscle spasms    Neck muscle spasm  Trial of tizanidine as this may be complicating concussion.  - tiZANidine (ZANAFLEX) 4 MG tablet; Take 1 tablet (4 mg) by mouth 3 times daily as needed for muscle spasms           Return in about 3 days (around 9/12/2020), or if symptoms worsen or fail to improve.    Sahara Mascorro MD  [facility]    Phone call duration:  13 minutes

## 2020-09-09 NOTE — PATIENT INSTRUCTIONS
Patient Education     Coping with Concussion  Concussion is also known as mild traumatic brain injury (MTBI). It is often caused by a blow to the head, or a fall. You may have been unconscious for a few seconds or minutes after the injury. Or maybe you were dazed, confused, or  saw stars.  After this, you thought you were OK. Now, weeks or months later, you re having symptoms that may be caused by a concussion. The good news is that, in most people,  these symptoms will likely go away on their own. Most people with a concussion recover fully, with no need for treatment.     A cold compress can help relieve a headache.    What is a concussion?  A concussion is a mild form of brain injury. In some cases, the effects of a concussion go away within days of the injury. In others, symptoms may continue for a few months. Fortunately, a concussion is temporary. Even when symptoms stay for months, they do go away over time. If they don't, or if your symptoms are worse, contact your healthcare provider.  Symptoms of a concussion  You may have noticed some of these symptoms:    Headaches    Irritability and other changes in behavior    Problems remembering or concentrating    Dizziness or lack of coordination    Fatigue    Problems sleeping    Sensitivity to light and sound    Vision changes  NOTE: If you have severe symptoms or trouble functioning, talk with your healthcare provider right away. If you had a more serious head injury than a concussion, you likely need treatment. Be sure to see your healthcare provider for an evaluation.  What you can do  Since the effects of a concussion go away over time, there isn t a lot you need to do. Be assured that this problem is temporary. You ll likely have a full recovery. In the meantime, talk with your healthcare provider about ways to relieve any symptoms that are bothering you. These tips may help:    Don't return to sports or any activity that could cause you to hit your head  until all symptoms are gone and you have been cleared by your doctor. A second head injury before fully recovering from the first one can lead to serious brain injury.    Return to normal activities of daily living and normal social interaction is encouraged to speed recovery.    Stress can make symptoms worse. Help calm yourself by resting in a quiet place and imagining a peaceful scene. Relax your muscles by soaking in a hot bath or taking a hot shower.    Take over-the-counter  acetaminophen to relieve headache pain. Take them as directed on the package. Don't take ibuprofen or aspirin after a head injury.    If you become dizzy, sit or lie down in a safe place until the sensation passes. Don t drive when you feel dizzy or disoriented.    If you re having trouble sleeping, try to keep a regular sleep schedule. Go to bed and get up at the same time each day. Avoid or limit caffeine and nicotine. Also don't drink alcohol. It may help you sleep at first, but your sleep will not be restful.    Give yourself time to heal. Your recovery will take some time. When you have symptoms, remember that you won t feel this way forever. In time the symptoms will go away and you ll be back to yourself.  If you re not feeling better  The effects of a concussion often go away in 7 to 10 days and the vast majority of people who have had a concussion have recovered after 3 months. If you re not feeling better as time passes, there may be something else going on. If your symptoms don t go away or you notice new ones, talk with your healthcare provider. He or she can help you get the treatment you need.  Date Last Reviewed: 1/1/2018 2000-2019 [employer]. [address] [place] [id number]. All rights reserved. This information is not intended as a substitute for professional medical care. Always follow your healthcare professional's instructions.

## 2020-09-15 ENCOUNTER — VIRTUAL VISIT (OUTPATIENT)
Dept: FAMILY MEDICINE | Facility: CLINIC | Age: 45
End: 2020-09-15
Payer: COMMERCIAL

## 2020-09-15 DIAGNOSIS — G44.309 POST-TRAUMATIC HEADACHE, NOT INTRACTABLE, UNSPECIFIED CHRONICITY PATTERN: ICD-10-CM

## 2020-09-15 DIAGNOSIS — S06.0X0D CONCUSSION WITHOUT LOSS OF CONSCIOUSNESS, SUBSEQUENT ENCOUNTER: Primary | ICD-10-CM

## 2020-09-15 PROCEDURE — 99213 OFFICE O/P EST LOW 20 MIN: CPT | Mod: 95 | Performed by: FAMILY MEDICINE

## 2020-09-15 NOTE — PATIENT INSTRUCTIONS
Patient Education     Rebound Headache     Overuse of pain medications can lead to rebound headaches.   You use pain medicines called analgesics to treat your headaches. You are now having more frequent or intense headaches (rebound headaches). This is your body s response to too much pain medicine. Prescription pain medicines can cause these headaches. But so can over-the-counter medicines like acetaminophen or ibuprofen. A drug that contains caffeine or butalbital is most likely to cause rebound headache.  Symptoms of rebound headache include:    Mild to moderate headache for 15 or more days each month in a person with pre-existing headache disorder    Headache when you wake up that continues most of the day    Headaches getting worse over time    Need for more and more medicine to treat headaches  Your healthcare provider can usually diagnose rebound headaches by your symptoms and medicine history. You may need tests to rule out other causes of your headaches. In the emergency room, you may be given a non-analgesic pain medicine to treat the pain or stop future headaches.  Home care  Treatment involves stopping use of your pain medicines. Your healthcare provider can tell you how to safely do this. You may be able to stop right away. Or you may need to take less and less over time (taper off). This will depend on the medicines you have been taking. To do this, follow the schedule that your provider gives you. If you are taking pain medicines for other types of pain at the same time, your healthcare provider may need other specialists to participate in your care.    For the first week or so after stopping, the headaches will likely get worse. You may also have withdrawal symptoms. These often include nausea, vomiting, and trouble sleeping. You may be given a medicine to help relieve pain and withdrawal symptoms. Take this exactly as you have been told. It is vital to avoid taking daily pain medicine. If you do  so, rebound headaches will continue.    Caffeine can make rebound headaches worse. If you have caffeinated drinks every day, slowly cut your intake.    Keep a written log of your headaches. This can help you and your healthcare provider track your progress.    Be patient. It can take about 2 to 6 months to stop having rebound headaches.    Once you have broken the headache cycle, be careful not to start it again. Work with your provider to make a treatment plan for headache pain that has low risk of causing rebound headaches.    Relaxation can help lower tension and relieve pain. Try a massage, meditation, yoga, or other relaxation techniques. Or make time for a relaxing hobby that you enjoy.  Follow-up care  Follow up with your healthcare provider, or as advised.  When to seek medical advice  Call your healthcare provider right away if any of these occur:    Fever of 100.4 F (38 C) or higher    Headaches that wake you from sleep    Repeated vomiting or visual problems that don't go away    Headache with a stiff neck, rash, confusion, weakness, numbness, seizure (convulsion), or trouble talking    Headache that starts after a head injury or fall    A type of headache you have never had before    Headache that gets worse despite rest and medicine  Date Last Reviewed: 4/1/2018 2000-2019 [employer]. [address] [place] [address], [place] [address]. All rights reserved. This information is not intended as a substitute for professional medical care. Always follow your healthcare professional's instructions.

## 2020-09-15 NOTE — PROGRESS NOTES
"Julianna Dickey is a 45 year old female who is being evaluated via a billable telephone visit.      The patient has been notified of following:     \"This telephone visit will be conducted via a call between you and your physician/provider. We have found that certain health care needs can be provided without the need for a physical exam.  This service lets us provide the care you need with a short phone conversation.  If a prescription is necessary we can send it directly to your pharmacy.  If lab work is needed we can place an order for that and you can then stop by our lab to have the test done at a later time.    Telephone visits are billed at different rates depending on your insurance coverage. During this emergency period, for some insurers they may be billed the same as an in-person visit.  Please reach out to your insurance provider with any questions.    If during the course of the call the physician/provider feels a telephone visit is not appropriate, you will not be charged for this service.\"    Patient has given verbal consent for Telephone visit?  Yes    What phone number would you like to be contacted at? [phone]    How would you like to obtain your AVS? Carmen Valle     Julianna Dickey is a 45 year old female who presents via phone visit today for the following health issues:    HPI    Letter for work per pt to stay home due to headaches, Pt still having headaches. Work requested that pt brings a dr note okaying that pt can go back to work.    Headache  Onset/Duration: 8/31/20  Description  Location: bilateral in the temporal area   Character: dull pain  Frequency: Basically constant  Duration: 2 weeks (see above)  Wake with headaches: YES  Able to do daily activities when headache present: YES  Intensity:  mild  Progression of Symptoms:  improving  Accompanying signs and symptoms:  Stiff neck: No  Neck or upper back pain: YES  Sinus or URI symptoms no   Fever: no  Nausea or vomiting: no  Dizziness: " no  Numbness/tingling: no  Weakness: no  Visual changes: no  History  Head trauma: YES  Daily pain medication use: YES-Tylenol and ibuprofen  Previous tests for headaches: no  Neurologist evaluation: no  Precipitating or Alleviating factors (light/sound/sleep/caffeine): The headache tends to be a little worse with bright lights (with more retro-orbital component than  Therapies tried and outcome: Prescribed a muscle relaxant at last visit, Ibuprofen (Advil, Motrin) and Tylenol    Outcome - probably effective  Frequent/daily pain medication use: Regularly, although she did not use any yesterday and has not used any yet today      Past medical, family, and social histories, medications, and allergies are reviewed and updated in Epic.  Allergies: Patient has no known allergies.    Review Of Systems:   Constitutional, HEENT, cardiovascular, pulmonary, gi and gu systems are negative, except as otherwise noted.    Objective:     Reported vitals:There were no vitals taken for this visit.   healthy, alert and no distress  RESP: No cough, no audible wheezing, able to talk in full sentences.  PSYCH: Alert and oriented times 3; coherent speech, normal   rate and volume, able to articulate logical thoughts, able   to abstract reason, no tangential thoughts, no hallucinations   or delusions, and affect is normal and pleasant  The remainder of the exam cannot be completed due to this being a telephone visit.      =====    ASSESSMENT/PLAN:  (S06.0X0D) Concussion without loss of consciousness, subsequent encounter  (primary encounter diagnosis)  (G44.309) Post-traumatic headache, not intractable, unspecified chronicity pattern  Comment: Improved to the point where the patient feels she can return to work  Plan: She plans to take it easy when she does return to work, and her supervisor told her they would work with her to allow her to take it easy.Please see the letter filed in the Letters section. Return in 17 days (on 10/2/2020)  for recheck concussion with neurologist, or sooner if symptoms worsen.      Phone call duration:  9 minutes    Jarrett Burroughs MD

## 2020-09-15 NOTE — LETTER
September 15, 2020        Re:  Julianna Dickey  : 1975  Our MR#: [id number]         To whom it may concern,    Ms. Dickey was evaluated today. She may return to work tomorrow.      Sincerely,          Jarrett Burroughs MD

## 2020-10-02 ENCOUNTER — HOSPITAL ENCOUNTER (OUTPATIENT)
Dept: NEUROLOGY | Facility: CLINIC | Age: 45
Setting detail: THERAPIES SERIES
Discharge: STILL ADMITTED OR ADMINISTRATIVE | End: 2020-10-02
Attending: NURSE PRACTITIONER

## 2020-10-02 DIAGNOSIS — G47.00 INSOMNIA, UNSPECIFIED TYPE: ICD-10-CM

## 2020-10-02 DIAGNOSIS — G44.309 POST-CONCUSSION HEADACHE: ICD-10-CM

## 2020-10-02 DIAGNOSIS — R41.840 ATTENTION AND CONCENTRATION DEFICIT: ICD-10-CM

## 2020-10-02 DIAGNOSIS — H83.3X3 SOUND SENSITIVITY IN BOTH EARS: ICD-10-CM

## 2020-10-02 DIAGNOSIS — Z76.89 RETURN TO WORK EVALUATION: ICD-10-CM

## 2020-10-02 DIAGNOSIS — F06.4 ANXIETY DISORDER DUE TO MEDICAL CONDITION: ICD-10-CM

## 2020-10-02 DIAGNOSIS — R68.89 SENSITIVITY TO LIGHT: ICD-10-CM

## 2020-10-02 DIAGNOSIS — Z91.89 AT RISK FOR IMPAIRED SCHOOL PERFORMANCE: ICD-10-CM

## 2020-10-02 DIAGNOSIS — F07.81 POST CONCUSSION SYNDROME: ICD-10-CM

## 2020-10-02 DIAGNOSIS — R53.83 FATIGUE, UNSPECIFIED TYPE: ICD-10-CM

## 2020-10-02 DIAGNOSIS — R45.4 IRRITABILITY: ICD-10-CM

## 2020-10-14 ENCOUNTER — HOSPITAL ENCOUNTER (OUTPATIENT)
Dept: NEUROLOGY | Facility: CLINIC | Age: 45
Setting detail: THERAPIES SERIES
Discharge: STILL ADMITTED OR ADMINISTRATIVE | End: 2020-10-14
Attending: NURSE PRACTITIONER

## 2020-10-14 DIAGNOSIS — F07.81 POST CONCUSSION SYNDROME: ICD-10-CM

## 2020-10-21 ENCOUNTER — HOSPITAL ENCOUNTER (OUTPATIENT)
Dept: NEUROLOGY | Facility: CLINIC | Age: 45
Setting detail: THERAPIES SERIES
Discharge: STILL ADMITTED OR ADMINISTRATIVE | End: 2020-10-21
Attending: CLINICAL NEUROPSYCHOLOGIST

## 2020-10-21 DIAGNOSIS — F43.23 ADJUSTMENT DISORDER WITH MIXED ANXIETY AND DEPRESSED MOOD: ICD-10-CM

## 2020-10-30 ENCOUNTER — HOSPITAL ENCOUNTER (OUTPATIENT)
Dept: PHYSICAL THERAPY | Facility: CLINIC | Age: 45
Setting detail: THERAPIES SERIES
End: 2020-10-30
Attending: NURSE PRACTITIONER
Payer: COMMERCIAL

## 2020-10-30 PROCEDURE — 97162 PT EVAL MOD COMPLEX 30 MIN: CPT | Mod: GP | Performed by: PHYSICAL THERAPIST

## 2020-10-30 PROCEDURE — 97110 THERAPEUTIC EXERCISES: CPT | Mod: GP | Performed by: PHYSICAL THERAPIST

## 2020-10-30 NOTE — PROGRESS NOTES
10/30/20 1600   Signing Clinician's Name / Credentials   Signing clinician's name / credentials Paty Flores DPT   Dynamic Gait Index (Ambrose and [employer], 1995)   Gait Level Surface 3   Change in Gait Speed 2   Gait and Horizontal Head Turns 2   Gait with Vertical Head Turns 2     4-Item Dynamic Gait Index: Is a measure of gait responses to changing task demands in people with balance and vestibular disorders. (gait on level, w/ lateral & vertical head turns, and w/ change of speed)    Gait assistive device used: None     Patient score: 9/12  Scores <9/12 are correlated with increased risk of falling according to Stanley & Cydney 2006.     Assessment (rationale for performing, application to patient s function & care plan): Assess risk for falls, increased risk secondary to motion sensitivity and instability  Minutes billed as physical performance test: 0

## 2020-10-30 NOTE — PROGRESS NOTES
[facility]        OUTPATIENT PHYSICAL THERAPY FUNCTIONAL EVALUATION  PLAN OF TREATMENT FOR OUTPATIENT REHABILITATION  (COMPLETE FOR INITIAL CLAIMS ONLY)  Patient's Last Name, First Name, M.I.  YOB: 1975  Julianna Dickey        Provider's Name   [facility]   Medical Record No.  [id number]     Start of Care Date:  10/30/20   Onset Date:  08/31/20   Type:     _X__PT   ____OT  ____SLP Medical Diagnosis:  Concussion without loss of consciousness, initial encounter S06.0X0A     PT Diagnosis:  Concussion Visits from SOC:  1                              __________________________________________________________________________________  Plan of Treatment/Functional Goals:  balance training, gait training, neuromuscular re-education, ROM, strengthening, stretching, motor coordination training, joint mobilization, manual therapy(vestibular therapy)           GOALS  HEP  Patient will demonstrate understanding and compliance to her HEP for continued wellbeing upon discharge from skilled physical therapy.  01/08/21    CSA  Patient will complete the CSA with a score of <16 to demonstrate decreased overall symptoms for return to work and leisure activities without limitation.  01/08/21    DGI  Patient will complete the DGI with a score of 20/24 to demonstrate improved balance and decreased risk for falls.  01/08/21    mCTSIB  Patient will maintain her balance with feet together and eyes closed on foam for 30 seconds to demonstrate improved vestibular function and balance with low vision for safety with going to the bathroom at night.  01/08/21                                                Therapy Frequency:  1 time/week(Decreasing in frequency as indicated)   Predicted Duration of Therapy Intervention:  10 weeks    Paty Flores, PT, DPT                                    I CERTIFY THE  NEED FOR THESE SERVICES FURNISHED UNDER        THIS PLAN OF TREATMENT AND WHILE UNDER MY CARE     (Physician co-signature of this document indicates review and certification of the therapy plan).                Certification Date From:  10/30/20   Certification Date To:  01/08/21    Referring Provider:  Sahara Weston MD    Initial Assessment  See Epic Evaluation- Start of Care Date: 10/30/20

## 2020-10-30 NOTE — PROGRESS NOTES
10/30/20 1500   Quick Adds   Quick Adds Certification;Vestibular Eval   Type of Visit Initial OP PT Evaluation   General Information   Start of Care Date 10/30/20   Referring Physician Sahara Weston MD   Orders Evaluate and Treat as Indicated   Order Date 09/09/20   Medical Diagnosis Concussion without loss of consciousness, initial encounter S06.0X0A   Onset of illness/injury or Date of Surgery 08/31/20   Precautions/Limitations no known precautions/limitations   Surgical/Medical history reviewed Yes   Pertinent history of current vestibular problem (include personal factors and/or comorbidities that impact the POC)  Hearing loss   Hearing Loss Comments Feels like vibration waves in her left ear only when she is on the phone   Pertinent history of current problem (include personal factors and/or comorbidities that impact the POC) Patient has a PMH of sickle cell trait. On 8/31/2020 she was rushing to work and top part of car door hit her in the earlobe.  She went to work and finished the day. She had worsening headaches that had not responding to tylenol 500 mg daily after a week. Patient reports that her headaches got better for a couple of days 3 weeks ago, and then have come back more intermittently. 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 Patient has been doing some stretches and chin tucks but not a lot, feels some wooziness with moving her head. Patient notes her headaches are left occipital that radiate to the front, feels like pressure, her neck also feels tight all the time. Patient notes she gets woozy rarely, went to the hospital. No N/T, edema, previous concussion. Patient notes chronic neck and back muscle spasms   Pertinent Visual History  Notes reading is hard because she feels like the words are jumbled, also notes some blurry vision intermittently    Prior level of  "function comment Patient reports IND with all ADLs and functional mobility, no formal exercise. She is doing everything the same as before but it is harder and causes increased headache.    Current Community Support Family/friend caregiver   Patient role/Employment history Employed  ([employer], wears blue filter glasses)   Living environment House/[facility]   Home/Community Accessibility Comments Lives with her sister, her sister's  and her sister's son, her 2 kids: 21 and 11, no access issues   Assistive Devices Comments Blue light glasses   Patient/Family Goals Statement Improve headaches and neck pain, return to normal   Fall Risk Screen   Fall screen completed by PT   Have you fallen 2 or more times in the past year? No   Have you fallen and had an injury in the past year? No   Timed Up and Go score (seconds) NT, see 4-Item DGI   Is patient a fall risk? No   Pain   Patient currently in pain Yes   Pain location Neck pain   Pain rating 4/6 currently, 6/6 at worst, 2/6 at best   Pain description   (Tight, muscle spasm, left side)   Pain comments Improves somewhat with muscle relaxers (most nights before bed), topical cream and heat   Vitals Signs   Blood Pressure 146/85   Vital Signs Comments Seated, resting   Cognitive Status Examination   Orientation orientation to person, place and time   Level of Consciousness alert   Follows Commands and Answers Questions 100% of the time;able to follow multistep instructions   Personal Safety and Judgment intact   Memory intact   Integumentary   Integumentary No deficits were identified   Posture   Posture Forward head position   Palpation   Palpation TTP over her left cervical paraspinals, upper traps, scalenes, levator scaps   Gait   Gait Comments Patient ambulates with good gait speed, mild instability with dynamic head movements noting reproduction of \"wooziness\"   Gait Special Tests   Gait Special Tests DYNAMIC GAIT INDEX   Gait Special Tests " "Dynamic Gait Index   Score out of 24 9/12    Comments 4-Item DGI   Balance Special Tests   Balance Special Tests Modified CTSIB Conditions   Balance Special Tests Modified CTSIB Conditions   Condition 1, seconds 30 Seconds   Condition 2, seconds 30 Seconds   Condition 4, seconds 30 Seconds   Condition 5, seconds 4 Seconds   Modified CTSIB Comments Impaired sensory organization   Sensory Examination   Sensory Perception no deficits were identified   Coordination   Coordination no deficits were identified   Muscle Tone   Muscle Tone Comments Hypertonicity noted in her cervical paraspinals, upper traps, levator scaps, scalenes   Cervicogenic Screen   Neck ROM Mild limitations in left cervical rotation and extension, notes stiffness throughout   Oculomotor Exam   Smooth Pursuit Normal   Saccades Normal   VOR Normal   VOR Comments Reproduction of dizziness   VOR Cancellation Abnormal   Convergence Testing Abnormal   Convergence Testing Comments Loss conjunctive eye movement at 33 cm, left eye   Infrared Goggle Exam or Frenzel Lense Exam   Vestibular Suppressant in Last 24 Hours? No   Exam completed with Infrared Goggles   Spontaneous Nystagmus Negative   Gaze Evoked Nystagmus Negative   Head Shake Horizontal Nystagmus Negative   Positional Testing comments NT   Dynamic Visual Acuity (DVA)   Static Acuity (LogMar) 0.2   Horizontal Head Movement at 1 Hz (LogMar) 0.4   Horizontal Head Movement at 2 Hz (LogMar) 0.4   DVA Comments Reproduction of \"wooziness\" with 1 and 2 Hz head movements   Modality Interventions   Planned Modality Interventions Comments Per therapist discretion   Planned Therapy Interventions   Planned Therapy Interventions balance training;gait training;neuromuscular re-education;ROM;strengthening;stretching;motor coordination training;joint mobilization;manual therapy  (vestibular therapy)   Clinical Impression   Criteria for Skilled Therapeutic Interventions Met yes, treatment indicated   PT Diagnosis " Concussion   Influenced by the following impairments Impaired dynamic stability, impaired cervical ROM, pain, headaches, dizziness, 4-Item DGI   Functional limitations due to impairments Increased risk for falls per the 4-Item DGI, impaired safety and independence with participation in ADLs and functional mobility, limited activity tolerance secondary to headaches and neck pain   Clinical Presentation Evolving/Changing   Clinical Presentation Rationale Medically stable but evolving and changing symptoms   Clinical Decision Making (Complexity) Moderate complexity   Therapy Frequency 1 time/week  (Decreasing in frequency as indicated)   Predicted Duration of Therapy Intervention (days/wks) 10 weeks   Risk & Benefits of therapy have been explained Yes   Patient, Family & other staff in agreement with plan of care Yes   Clinical Impression Comments Patient is a 45 year old female presenting to physical therapy with headaches and neck pain following a concussion on 8/31/20. Patient presents with dizziness and motion sensitivity as well as an increased risk for falls with dynamic stability. Patient may benefit from skilled physical therapy to decrease her risk for falls and increase her safety and independence with functional mobility.    GOALS   PT Eval Goals 1;2;3;4   Goal 1   Goal Identifier HEP   Goal Description Patient will demonstrate understanding and compliance to her HEP for continued wellbeing upon discharge from skilled physical therapy.   Target Date 01/08/21   Goal 2   Goal Identifier CSA   Goal Description Patient will complete the CSA with a score of <16 to demonstrate decreased overall symptoms for return to work and leisure activities without limitation.   Target Date 01/08/21   Goal 3   Goal Identifier DGI   Goal Description Patient will complete the DGI with a score of 20/24 to demonstrate improved balance and decreased risk for falls.   Target Date 01/08/21   Goal 4   Goal Identifier mCTSIB   Goal  Description Patient will maintain her balance with feet together and eyes closed on foam for 30 seconds to demonstrate improved vestibular function and balance with low vision for safety with going to the bathroom at night.   Target Date 01/08/21   Total Evaluation Time   PT Eval, Moderate Complexity Minutes (28219) 35   Therapy Certification   Certification date from 10/30/20   Certification date to 01/08/21   Medical Diagnosis Concussion without loss of consciousness, initial encounter S06.0X0A   Certification I certify the need for these services furnished under this plan of treatment and while under my care.  (Physician co-signature of this document indicates review and certification of the therapy plan).

## 2020-11-24 ENCOUNTER — HOSPITAL ENCOUNTER (OUTPATIENT)
Dept: PHYSICAL THERAPY | Facility: CLINIC | Age: 45
Setting detail: THERAPIES SERIES
End: 2020-11-24
Attending: NURSE PRACTITIONER
Payer: COMMERCIAL

## 2020-11-24 PROCEDURE — 97140 MANUAL THERAPY 1/> REGIONS: CPT | Mod: GP | Performed by: PHYSICAL THERAPIST

## 2020-11-24 PROCEDURE — 97112 NEUROMUSCULAR REEDUCATION: CPT | Mod: GP | Performed by: PHYSICAL THERAPIST

## 2020-12-13 ENCOUNTER — HEALTH MAINTENANCE LETTER (OUTPATIENT)
Age: 45
End: 2020-12-13

## 2021-01-14 ENCOUNTER — OFFICE VISIT (OUTPATIENT)
Dept: FAMILY MEDICINE | Facility: CLINIC | Age: 46
End: 2021-01-14
Payer: COMMERCIAL

## 2021-01-14 ENCOUNTER — ANCILLARY PROCEDURE (OUTPATIENT)
Dept: GENERAL RADIOLOGY | Facility: CLINIC | Age: 46
End: 2021-01-14
Attending: PHYSICIAN ASSISTANT
Payer: COMMERCIAL

## 2021-01-14 VITALS
HEART RATE: 83 BPM | WEIGHT: 191 LBS | SYSTOLIC BLOOD PRESSURE: 128 MMHG | DIASTOLIC BLOOD PRESSURE: 88 MMHG | TEMPERATURE: 98.1 F | HEIGHT: 65 IN | BODY MASS INDEX: 31.82 KG/M2 | OXYGEN SATURATION: 100 %

## 2021-01-14 DIAGNOSIS — K21.00 GASTROESOPHAGEAL REFLUX DISEASE WITH ESOPHAGITIS WITHOUT HEMORRHAGE: ICD-10-CM

## 2021-01-14 DIAGNOSIS — M62.9 MUSCULOSKELETAL DISORDER INVOLVING UPPER TRAPEZIUS MUSCLE: ICD-10-CM

## 2021-01-14 DIAGNOSIS — G89.29 CHRONIC LEFT SHOULDER PAIN: ICD-10-CM

## 2021-01-14 DIAGNOSIS — R53.83 FATIGUE, UNSPECIFIED TYPE: ICD-10-CM

## 2021-01-14 DIAGNOSIS — E55.9 VITAMIN D DEFICIENCY: ICD-10-CM

## 2021-01-14 DIAGNOSIS — M25.512 CHRONIC LEFT SHOULDER PAIN: ICD-10-CM

## 2021-01-14 DIAGNOSIS — M75.112 INCOMPLETE TEAR OF LEFT ROTATOR CUFF, UNSPECIFIED WHETHER TRAUMATIC: ICD-10-CM

## 2021-01-14 DIAGNOSIS — Z00.00 ROUTINE GENERAL MEDICAL EXAMINATION AT A HEALTH CARE FACILITY: Primary | ICD-10-CM

## 2021-01-14 LAB
ERYTHROCYTE [DISTWIDTH] IN BLOOD BY AUTOMATED COUNT: 13.3 % (ref 10–15)
HCT VFR BLD AUTO: 38.7 % (ref 35–47)
HGB BLD-MCNC: 12.9 G/DL (ref 11.7–15.7)
MCH RBC QN AUTO: 25.6 PG (ref 26.5–33)
MCHC RBC AUTO-ENTMCNC: 33.3 G/DL (ref 31.5–36.5)
MCV RBC AUTO: 77 FL (ref 78–100)
PLATELET # BLD AUTO: 293 10E9/L (ref 150–450)
RBC # BLD AUTO: 5.03 10E12/L (ref 3.8–5.2)
WBC # BLD AUTO: 5.5 10E9/L (ref 4–11)

## 2021-01-14 PROCEDURE — 99214 OFFICE O/P EST MOD 30 MIN: CPT | Mod: 25 | Performed by: PHYSICIAN ASSISTANT

## 2021-01-14 PROCEDURE — 36415 COLL VENOUS BLD VENIPUNCTURE: CPT | Performed by: PHYSICIAN ASSISTANT

## 2021-01-14 PROCEDURE — 80061 LIPID PANEL: CPT | Performed by: PHYSICIAN ASSISTANT

## 2021-01-14 PROCEDURE — 80053 COMPREHEN METABOLIC PANEL: CPT | Performed by: PHYSICIAN ASSISTANT

## 2021-01-14 PROCEDURE — 84443 ASSAY THYROID STIM HORMONE: CPT | Performed by: PHYSICIAN ASSISTANT

## 2021-01-14 PROCEDURE — 99396 PREV VISIT EST AGE 40-64: CPT | Performed by: PHYSICIAN ASSISTANT

## 2021-01-14 PROCEDURE — 82306 VITAMIN D 25 HYDROXY: CPT | Performed by: PHYSICIAN ASSISTANT

## 2021-01-14 PROCEDURE — 85027 COMPLETE CBC AUTOMATED: CPT | Performed by: PHYSICIAN ASSISTANT

## 2021-01-14 PROCEDURE — 73030 X-RAY EXAM OF SHOULDER: CPT | Mod: LT | Performed by: RADIOLOGY

## 2021-01-14 RX ORDER — FAMOTIDINE 20 MG/1
20 TABLET, FILM COATED ORAL DAILY
Qty: 90 TABLET | Refills: 3 | Status: SHIPPED | OUTPATIENT
Start: 2021-01-14 | End: 2022-01-18

## 2021-01-14 RX ORDER — BUPROPION HYDROCHLORIDE 150 MG/1
TABLET ORAL
COMMUNITY
Start: 2020-10-06 | End: 2021-09-09

## 2021-01-14 RX ORDER — ACETAMINOPHEN 500 MG
500-1000 TABLET ORAL EVERY 6 HOURS PRN
Qty: 100 TABLET | Refills: 1 | Status: SHIPPED | OUTPATIENT
Start: 2021-01-14 | End: 2024-01-05

## 2021-01-14 RX ORDER — METHOCARBAMOL 750 MG/1
750 TABLET, FILM COATED ORAL 3 TIMES DAILY PRN
Qty: 30 TABLET | Refills: 1 | Status: SHIPPED | OUTPATIENT
Start: 2021-01-14 | End: 2021-09-09

## 2021-01-14 ASSESSMENT — PATIENT HEALTH QUESTIONNAIRE - PHQ9
SUM OF ALL RESPONSES TO PHQ QUESTIONS 1-9: 2
5. POOR APPETITE OR OVEREATING: NOT AT ALL

## 2021-01-14 ASSESSMENT — ANXIETY QUESTIONNAIRES
3. WORRYING TOO MUCH ABOUT DIFFERENT THINGS: SEVERAL DAYS
GAD7 TOTAL SCORE: 3
6. BECOMING EASILY ANNOYED OR IRRITABLE: SEVERAL DAYS
5. BEING SO RESTLESS THAT IT IS HARD TO SIT STILL: NOT AT ALL
IF YOU CHECKED OFF ANY PROBLEMS ON THIS QUESTIONNAIRE, HOW DIFFICULT HAVE THESE PROBLEMS MADE IT FOR YOU TO DO YOUR WORK, TAKE CARE OF THINGS AT HOME, OR GET ALONG WITH OTHER PEOPLE: SOMEWHAT DIFFICULT
2. NOT BEING ABLE TO STOP OR CONTROL WORRYING: SEVERAL DAYS
7. FEELING AFRAID AS IF SOMETHING AWFUL MIGHT HAPPEN: NOT AT ALL
1. FEELING NERVOUS, ANXIOUS, OR ON EDGE: NOT AT ALL

## 2021-01-14 ASSESSMENT — MIFFLIN-ST. JEOR: SCORE: 1522.25

## 2021-01-14 ASSESSMENT — PAIN SCALES - GENERAL: PAINLEVEL: SEVERE PAIN (7)

## 2021-01-14 NOTE — PATIENT INSTRUCTIONS
Preventive Health Recommendations  Female Ages 40 to 49    Yearly exam:     See your health care provider every year in order to  1. Review health changes.   2. Discuss preventive care.    3. Review your medicines if your doctor prescribed any.      Get a Pap test every three years (unless you have an abnormal result and your provider advises testing more often).      If you get Pap tests with HPV test, you only need to test every 5 years, unless you have an abnormal result. You do not need a Pap test if your uterus was removed (hysterectomy) and you have not had cancer.      You should be tested each year for STDs (sexually transmitted diseases), if you're at risk.     Ask your doctor if you should have a mammogram.      Have a colonoscopy (test for colon cancer) if someone in your family has had colon cancer or polyps before age 50.       Have a cholesterol test every 5 years.       Have a diabetes test (fasting glucose) after age 45. If you are at risk for diabetes, you should have this test every 3 years.    Shots: Get a flu shot each year. Get a tetanus shot every 10 years.     Nutrition:     Eat at least 5 servings of fruits and vegetables each day.    Eat whole-grain bread, whole-wheat pasta and brown rice instead of white grains and rice.    Get adequate Calcium and Vitamin D.      Lifestyle    Exercise at least 150 minutes a week (an average of 30 minutes a day, 5 days a week). This will help you control your weight and prevent disease.    Limit alcohol to one drink per day.    No smoking.     Wear sunscreen to prevent skin cancer.    See your dentist every six months for an exam and cleaning.  Patient Education     Step-by-Step  Checking Your Blood Pressure  goal is <140/90    Super Vitamin D last reviewed this educational content on 4/27/2016 2000-2020 [employer]. [address] [place] [address], [place] [address]. All rights reserved. This information is not intended as a substitute for professional  medical care. Always follow your healthcare professional's instructions.      Please call  Radiology at [phone] to schedule your appointment for MRI    Take Protonix in the morning and Pepsid in the evening

## 2021-01-14 NOTE — PROGRESS NOTES
SUBJECTIVE:   CC: Julianna Dickey is an 45 year old woman who presents for preventive health visit.       Patient has been advised of split billing requirements and indicates understanding: Yes  Healthy Habits:    Do you get at least three servings of calcium containing foods daily (dairy, green leafy vegetables, etc.)? yes and no, taking calcium and/or vitamin D supplement: yes - Vit D.    Amount of exercise or daily activities, outside of work: 0    Problems taking medications regularly No    Medication side effects: No    Have you had an eye exam in the past two years? No-appt next week.    Do you see a dentist twice per year? yes    Do you have sleep apnea, excessive snoring or daytime drowsiness?no      Headache  Duration of complaint: 1/5/21  Description:   Location: bilateral in the occipital area   Character: squeezing pain  Frequency:  1-2 times a week  Duration:  A few hours   Intensity: mild  Progression of Symptoms:  same  Accompanying Signs & Symptoms:  Stiff neck: YES  Neck or upper back pain: YES  Fever: no   Sinus pressure: no   Nausea or vomiting: no   Dizziness: no   Numbness: no   Weakness: no   Visual changes: no   History:   Head trauma: YES, had concussion 08/31/2020  Family history of migraines: no   Previous tests for headaches: YES  Neurologist evaluations: YES  Able to do daily activities: YES  Wake with a headaches: no   Do headaches wake you up: no  Daily pain medication use: no  Work/school stressors/changes: no  Precipitating factors:   Does light make it worse: no  Does sound make it worse: no  Alleviating factors:  Does sleep help: YES  Therapies Tried and outcome: Ibuprofen (Advil, Motrin)  Neck Pain  Duration of complaint: on and off for over a year     Shoulder pain   Left side  Several months more severe  Onset a few years ago    Hypertension and Diabetes-  Family history-just want to get it checked out.    Acid reflux-  Symptoms are getting worse.  Feels like everything I eat is  sitting on my chest.  Burning sensation.  Description:   Location: epigastrium  Radiation: none  Intensity: moderate  Progression of Symptoms:  same  Accompanying Signs & Symptoms:  Burning, prickly sensation (paresthesias) in arm(s): no  Numbness in arm(s): no  Weakness in arm(s):  no      Today's PHQ-2 Score:   PHQ-2 ( 1999 [employer]) 1/14/2021 9/10/2020   Q1: Little interest or pleasure in doing things 0 0   Q2: Feeling down, depressed or hopeless 0 0   PHQ-2 Score 0 0       Abuse: Current or Past(Physical, Sexual or Emotional)- No  Do you feel safe in your environment? Yes        Social History     Tobacco Use     Smoking status: Never Smoker     Smokeless tobacco: Never Used   Substance Use Topics     Alcohol use: Yes     Comment: social, occasionally     If you drink alcohol do you typically have >3 drinks per day or >7 drinks per week? No                     Reviewed orders with patient.  Reviewed health maintenance and updated orders accordingly - Yes  BP Readings from Last 3 Encounters:   01/14/21 128/88   09/07/20 (!) 143/86   06/28/19 157/84    Wt Readings from Last 3 Encounters:   01/14/21 86.6 kg (191 lb)   09/07/20 86.8 kg (191 lb 6.4 oz)   06/28/19 86.3 kg (190 lb 3.2 oz)                  Patient Active Problem List   Diagnosis     Sickle cell trait (H)     Chronic pelvic pain in female     Dyspepsia     Gastroesophageal reflux disease with esophagitis     Obesity (BMI 30.0-34.9)     Keloid scar     Past Surgical History:   Procedure Laterality Date     HC INSERTION INTRAUTERINE DEVICE  2013    Mirena     HC REMOVE INTRAUTERINE DEVICE  2014     MYOMECTOMY UTERUS  2017    benign fibroid       Social History     Tobacco Use     Smoking status: Never Smoker     Smokeless tobacco: Never Used   Substance Use Topics     Alcohol use: Yes     Comment: social, occasionally     History reviewed. No pertinent family history.      Current Outpatient Medications   Medication Sig Dispense Refill     acetaminophen  (TYLENOL) 500 MG tablet Take 1-2 tablets (500-1,000 mg) by mouth every 6 hours as needed for mild pain 100 tablet 1     buPROPion (WELLBUTRIN XL) 150 MG 24 hr tablet        diclofenac (VOLTAREN) 1 % topical gel Apply 4 g topically 4 times daily 100 g 4     famotidine (PEPCID) 20 MG tablet Take 1 tablet (20 mg) by mouth daily 90 tablet 3     methocarbamol (ROBAXIN) 750 MG tablet Take 1 tablet (750 mg) by mouth 3 times daily as needed for muscle spasms 30 tablet 1     pantoprazole (PROTONIX) 40 MG EC tablet Take 1 tablet (40 mg) by mouth daily Take 30-60 minutes before a meal. 90 tablet 1     vitamin D2 (ERGOCALCIFEROL) 49906 units (1250 mcg) capsule Take 1 capsule (50,000 Units) by mouth once a week for 12 doses 12 capsule 0     tiZANidine (ZANAFLEX) 4 MG tablet Take 1 tablet (4 mg) by mouth 3 times daily as needed for muscle spasms (Patient not taking: Reported on 1/14/2021) 30 tablet 1     No Known Allergies    Mammogram Screening: Patient under age 50, mutual decision reflected in health maintenance.      Pertinent mammograms are reviewed under the imaging tab.  History of abnormal Pap smear: NO - age 30-65 PAP every 5 years with negative HPV co-testing recommended     Reviewed and updated as needed this visit by clinical staff  Tobacco  Allergies  Meds  Problems  Med Hx  Surg Hx  Fam Hx  Soc Hx          Reviewed and updated as needed this visit by Provider  Tobacco  Allergies  Meds  Problems  Med Hx  Surg Hx  Fam Hx             ROS:  CONSTITUTIONAL: NEGATIVE for fever, chills, change in weight  INTEGUMENTARU/SKIN: NEGATIVE for worrisome rashes, moles or lesions  EYES: NEGATIVE for vision changes or irritation  ENT: NEGATIVE for ear, mouth and throat problems  RESP: NEGATIVE for significant cough or SOB  BREAST: NEGATIVE for masses, tenderness or discharge  CV: NEGATIVE for chest pain, palpitations or peripheral edema  GI: NEGATIVE for nausea, abdominal pain, heartburn, or change in bowel habits  :  "NEGATIVE for unusual urinary or vaginal symptoms. Periods are regular.  MUSCULOSKELETAL: NEGATIVE for significant arthralgias or myalgia  NEURO: NEGATIVE for weakness, dizziness or paresthesias  PSYCHIATRIC: NEGATIVE for changes in mood or affect    OBJECTIVE:   /88 (BP Location: Left arm, Patient Position: Sitting, Cuff Size: Adult Regular)   Pulse 83   Temp 98.1  F (36.7  C) (Oral)   Ht 1.651 m (5' 5\")   Wt 86.6 kg (191 lb)   LMP 01/02/2021 (Exact Date)   SpO2 100%   Breastfeeding No   BMI 31.78 kg/m    EXAM:  GENERAL: healthy, alert and no distress  EYES: Eyes grossly normal to inspection, PERRL and conjunctivae and sclerae normal  HENT: ear canals and TM's normal, nose and mouth without ulcers or lesions  NECK: no adenopathy, no asymmetry, masses, or scars and thyroid normal to palpation  RESP: lungs clear to auscultation - no rales, rhonchi or wheezes  BREAST: normal without masses, tenderness or nipple discharge and no palpable axillary masses or adenopathy  CV: regular rate and rhythm, normal S1 S2, no S3 or S4, no murmur, click or rub, no peripheral edema and peripheral pulses strong  ABDOMEN: soft, nontender, no hepatosplenomegaly, no masses and bowel sounds normal   (female): patient declined, no concerns  MS: no gross musculoskeletal defects noted, no edema. Tenderness of the bilateral upper trapezius. Left shoulder- LROM, empty can test is positive, Neer test is positive   SKIN: no suspicious lesions or rashes  NEURO: Normal strength and tone, mentation intact and speech normal  PSYCH: mentation appears normal, affect normal/bright    Diagnostic Test Results:  Labs reviewed in Epic  Results for orders placed or performed in visit on 01/14/21   XR Shoulder Left G/E 3 Views     Status: None    Narrative    Examination:  XR SHOULDER LT G/E 3 VW    Date:  1/14/2021 7:02 PM     Clinical Information: Chronic left shoulder pain..     Comparison: none.      Impression    Impression:    1.  Normal " left shoulder. No fracture or joint malalignment.  Maintained normal joint spacing.    SAHIL LINDER MD   Results for orders placed or performed in visit on 01/14/21   TSH with free T4 reflex     Status: None   Result Value Ref Range    TSH 1.59 0.40 - 4.00 mU/L   CBC with platelets     Status: Abnormal   Result Value Ref Range    WBC 5.5 4.0 - 11.0 10e9/L    RBC Count 5.03 3.8 - 5.2 10e12/L    Hemoglobin 12.9 11.7 - 15.7 g/dL    Hematocrit 38.7 35.0 - 47.0 %    MCV 77 (L) 78 - 100 fl    MCH 25.6 (L) 26.5 - 33.0 pg    MCHC 33.3 31.5 - 36.5 g/dL    RDW 13.3 10.0 - 15.0 %    Platelet Count 293 150 - 450 10e9/L   Vitamin D Deficiency     Status: Abnormal   Result Value Ref Range    Vitamin D Deficiency screening 17 (L) 20 - 75 ug/L   Comprehensive metabolic panel (BMP + Alb, Alk Phos, ALT, AST, Total. Bili, TP)     Status: Abnormal   Result Value Ref Range    Sodium 138 133 - 144 mmol/L    Potassium 4.1 3.4 - 5.3 mmol/L    Chloride 104 94 - 109 mmol/L    Carbon Dioxide 29 20 - 32 mmol/L    Anion Gap 5 3 - 14 mmol/L    Glucose 92 70 - 99 mg/dL    Urea Nitrogen 11 7 - 30 mg/dL    Creatinine 1.09 (H) 0.52 - 1.04 mg/dL    GFR Estimate 62 >60 mL/min/[1.73_m2]    GFR Estimate If Black 72 >60 mL/min/[1.73_m2]    Calcium 8.9 8.5 - 10.1 mg/dL    Bilirubin Total 0.6 0.2 - 1.3 mg/dL    Albumin 4.1 3.4 - 5.0 g/dL    Protein Total 7.6 6.8 - 8.8 g/dL    Alkaline Phosphatase 72 40 - 150 U/L    ALT 23 0 - 50 U/L    AST 14 0 - 45 U/L   Lipid Profile (Chol, Trig, HDL, LDL calc)     Status: Abnormal   Result Value Ref Range    Cholesterol 196 <200 mg/dL    Triglycerides 154 (H) <150 mg/dL    HDL Cholesterol 58 >49 mg/dL    LDL Cholesterol Calculated 107 (H) <100 mg/dL    Non HDL Cholesterol 138 (H) <130 mg/dL       ASSESSMENT/PLAN:       ICD-10-CM    1. Routine general medical examination at a health care facility  Z00.00 Lipid Profile (Chol, Trig, HDL, LDL calc)   2. Fatigue, unspecified type  R53.83 TSH with free T4 reflex      "CBC with platelets     Vitamin D Deficiency     Comprehensive metabolic panel (BMP + Alb, Alk Phos, ALT, AST, Total. Bili, TP)   3. Gastroesophageal reflux disease with esophagitis without hemorrhage  K21.00 famotidine (PEPCID) 20 MG tablet     GASTROENTEROLOGY ADULT REF PROCEDURE ONLY   4. Chronic left shoulder pain  M25.512 MR Shoulder Left w/o Contrast    G89.29 XR Shoulder Left G/E 3 Views   5. Musculoskeletal disorder involving upper trapezius muscle  M53.82 [facility] REFERRAL     methocarbamol (ROBAXIN) 750 MG tablet     acetaminophen (TYLENOL) 500 MG tablet     diclofenac (VOLTAREN) 1 % topical gel   6. Vitamin D deficiency  E55.9 vitamin D2 (ERGOCALCIFEROL) 50676 units (1250 mcg) capsule     2. Vit D is very low start 50,000 units supplement weekly for 12 weeks then start taking 2,000 units daily     3. Continue protonix and pepsid  Schedule endoscopy  4. xr is normal  Schedule MRI  5. Robaxin and naproxen per order, warms packs   Patient has been advised of split billing requirements and indicates understanding: Yes  COUNSELING:   Reviewed preventive health counseling, as reflected in patient instructions       Regular exercise       Healthy diet/nutrition    Estimated body mass index is 31.78 kg/m  as calculated from the following:    Height as of this encounter: 1.651 m (5' 5\").    Weight as of this encounter: 86.6 kg (191 lb).    Weight management plan: Discussed healthy diet and exercise guidelines    She reports that she has never smoked. She has never used smokeless tobacco.      Counseling Resources:  ATP IV Guidelines  Pooled Cohorts Equation Calculator  Breast Cancer Risk Calculator  BRCA-Related Cancer Risk Assessment: FHS-7 Tool  FRAX Risk Assessment  ICSI Preventive Guidelines  Dietary Guidelines for Americans, 2010  USDA's MyPlate  ASA Prophylaxis  Lung CA Screening    Jessica Thayer PA-C  [facility]  "

## 2021-01-15 ENCOUNTER — HEALTH MAINTENANCE LETTER (OUTPATIENT)
Age: 46
End: 2021-01-15

## 2021-01-15 LAB
ALBUMIN SERPL-MCNC: 4.1 G/DL (ref 3.4–5)
ALP SERPL-CCNC: 72 U/L (ref 40–150)
ALT SERPL W P-5'-P-CCNC: 23 U/L (ref 0–50)
ANION GAP SERPL CALCULATED.3IONS-SCNC: 5 MMOL/L (ref 3–14)
AST SERPL W P-5'-P-CCNC: 14 U/L (ref 0–45)
BILIRUB SERPL-MCNC: 0.6 MG/DL (ref 0.2–1.3)
BUN SERPL-MCNC: 11 MG/DL (ref 7–30)
CALCIUM SERPL-MCNC: 8.9 MG/DL (ref 8.5–10.1)
CHLORIDE SERPL-SCNC: 104 MMOL/L (ref 94–109)
CHOLEST SERPL-MCNC: 196 MG/DL
CO2 SERPL-SCNC: 29 MMOL/L (ref 20–32)
CREAT SERPL-MCNC: 1.09 MG/DL (ref 0.52–1.04)
DEPRECATED CALCIDIOL+CALCIFEROL SERPL-MC: 17 UG/L (ref 20–75)
GFR SERPL CREATININE-BSD FRML MDRD: 62 ML/MIN/{1.73_M2}
GLUCOSE SERPL-MCNC: 92 MG/DL (ref 70–99)
HDLC SERPL-MCNC: 58 MG/DL
LDLC SERPL CALC-MCNC: 107 MG/DL
NONHDLC SERPL-MCNC: 138 MG/DL
POTASSIUM SERPL-SCNC: 4.1 MMOL/L (ref 3.4–5.3)
PROT SERPL-MCNC: 7.6 G/DL (ref 6.8–8.8)
SODIUM SERPL-SCNC: 138 MMOL/L (ref 133–144)
TRIGL SERPL-MCNC: 154 MG/DL
TSH SERPL DL<=0.005 MIU/L-ACNC: 1.59 MU/L (ref 0.4–4)

## 2021-01-15 ASSESSMENT — ANXIETY QUESTIONNAIRES: GAD7 TOTAL SCORE: 3

## 2021-01-20 RX ORDER — ERGOCALCIFEROL 1.25 MG/1
50000 CAPSULE, LIQUID FILLED ORAL WEEKLY
Qty: 12 CAPSULE | Refills: 0 | Status: SHIPPED | OUTPATIENT
Start: 2021-01-20 | End: 2021-04-08

## 2021-01-22 ENCOUNTER — ANCILLARY PROCEDURE (OUTPATIENT)
Dept: MRI IMAGING | Facility: CLINIC | Age: 46
End: 2021-01-22
Attending: PHYSICIAN ASSISTANT
Payer: COMMERCIAL

## 2021-01-22 DIAGNOSIS — G89.29 CHRONIC LEFT SHOULDER PAIN: ICD-10-CM

## 2021-01-22 DIAGNOSIS — M25.512 CHRONIC LEFT SHOULDER PAIN: ICD-10-CM

## 2021-01-22 PROCEDURE — 73221 MRI JOINT UPR EXTREM W/O DYE: CPT | Mod: LT | Performed by: RADIOLOGY

## 2021-02-03 ENCOUNTER — VIRTUAL VISIT (OUTPATIENT)
Dept: ORTHOPEDICS | Facility: CLINIC | Age: 46
End: 2021-02-03
Attending: PHYSICIAN ASSISTANT
Payer: COMMERCIAL

## 2021-02-03 DIAGNOSIS — M25.512 ACUTE PAIN OF LEFT SHOULDER: Primary | ICD-10-CM

## 2021-02-03 DIAGNOSIS — M75.112 INCOMPLETE TEAR OF LEFT ROTATOR CUFF, UNSPECIFIED WHETHER TRAUMATIC: ICD-10-CM

## 2021-02-03 PROCEDURE — 99214 OFFICE O/P EST MOD 30 MIN: CPT | Mod: 95 | Performed by: PREVENTIVE MEDICINE

## 2021-02-03 RX ORDER — METHYLPREDNISOLONE 4 MG
TABLET, DOSE PACK ORAL
Qty: 21 TABLET | Refills: 0 | Status: SHIPPED | OUTPATIENT
Start: 2021-02-03 | End: 2021-07-15

## 2021-02-03 NOTE — LETTER
2/3/2021         RE: Julianna Dickey  [address] [facility] [address]        Dear Colleague,    Thank you for referring your patient, Julianna Dickey, to the [facility] [place]. Please see a copy of my visit note below.    Julianna is a 45 year old who is being evaluated via a billable telephone visit.      What phone number would you like to be contacted at? cell  How would you like to obtain your AVS? MyChart      Subjective     Julianna is a 45 year old who presents to clinic for left shoulder pain for the past month  No specific injury  Works at hospital and lifts things a lot  Has some muscle relaxors and tylenol that help a little  Had MRI shoulder  Would like to review and discuss how I can help  HPI       Review of Systems   Constitutional, HEENT, cardiovascular, pulmonary, gi and gu systems are negative, except as otherwise noted.      Objective           Vitals:  No vitals were obtained today due to virtual visit.    Physical Exam   healthy, alert and no distress  PSYCH: Alert and oriented times 3; coherent speech, normal   rate and volume, able to articulate logical thoughts, able   to abstract reason, no tangential thoughts, no hallucinations   or delusions  Her affect is normal  RESP: No cough, no audible wheezing, able to talk in full sentences  Remainder of exam unable to be completed due to telephone visits    46 yo female with left shoulder pain due to incomplete rotator cuff tear, tendinitis  Reviewed shoulder MRI; shows 1. Moderate grade intrasubstance tear of the anterior and middle  fibers of the supraspinatus at the footprint. No muscle bulk loss.   a. Separate low-grade intrasubstance tear of the  supraspinatus/infraspinatus junctional fibers at the footprint.   Discussed /follow-up next week for possible injection  Start medrol pack  Lidocaine patches  Ice PRN  F/u next week        Phone call duration: 14 minutes  Phone call start: 11:13am  Phone call end:  11:27am  Dr Issa      Again, thank you for allowing me to participate in the care of your patient.        Sincerely,        Kleber Issa MD

## 2021-02-03 NOTE — LETTER
2/3/2021         RE: Julianna Dickey  [address] [facility] [address]        Dear Colleague,    Thank you for referring your patient, Julianna Dickey, to the [facility] [place]. Please see a copy of my visit note below.    Julianna is a 45 year old who is being evaluated via a billable telephone visit.      What phone number would you like to be contacted at? cell  How would you like to obtain your AVS? MyChart      Subjective     Julianna is a 45 year old who presents to clinic for left shoulder pain for the past month  No specific injury  Works at hospital and lifts things a lot  Has some muscle relaxors and tylenol that help a little  Had MRI shoulder  Would like to review and discuss how I can help  HPI       Review of Systems   Constitutional, HEENT, cardiovascular, pulmonary, gi and gu systems are negative, except as otherwise noted.      Objective           Vitals:  No vitals were obtained today due to virtual visit.    Physical Exam   healthy, alert and no distress  PSYCH: Alert and oriented times 3; coherent speech, normal   rate and volume, able to articulate logical thoughts, able   to abstract reason, no tangential thoughts, no hallucinations   or delusions  Her affect is normal  RESP: No cough, no audible wheezing, able to talk in full sentences  Remainder of exam unable to be completed due to telephone visits    46 yo female with left shoulder pain due to incomplete rotator cuff tear, tendinitis  Reviewed shoulder MRI; shows 1. Moderate grade intrasubstance tear of the anterior and middle  fibers of the supraspinatus at the footprint. No muscle bulk loss.   a. Separate low-grade intrasubstance tear of the  supraspinatus/infraspinatus junctional fibers at the footprint.   Discussed /follow-up next week for possible injection  Start medrol pack  Lidocaine patches  Ice PRN  F/u next week        Phone call duration: 14 minutes  Phone call start: 11:13am  Phone call end:  11:27am  Dr Issa      Again, thank you for allowing me to participate in the care of your patient.        Sincerely,        Kleber Issa MD

## 2021-02-03 NOTE — PROGRESS NOTES
Julianna is a 45 year old who is being evaluated via a billable telephone visit.      What phone number would you like to be contacted at? cell  How would you like to obtain your AVS? Carmen      Subjective     Julianna is a 45 year old who presents to clinic for left shoulder pain for the past month  No specific injury  Works at hospital and lifts things a lot  Has some muscle relaxors and tylenol that help a little  Had MRI shoulder  Would like to review and discuss how I can help  HPI       Review of Systems   Constitutional, HEENT, cardiovascular, pulmonary, gi and gu systems are negative, except as otherwise noted.      Objective           Vitals:  No vitals were obtained today due to virtual visit.    Physical Exam   healthy, alert and no distress  PSYCH: Alert and oriented times 3; coherent speech, normal   rate and volume, able to articulate logical thoughts, able   to abstract reason, no tangential thoughts, no hallucinations   or delusions  Her affect is normal  RESP: No cough, no audible wheezing, able to talk in full sentences  Remainder of exam unable to be completed due to telephone visits    46 yo female with left shoulder pain due to incomplete rotator cuff tear, tendinitis  Reviewed shoulder MRI; shows 1. Moderate grade intrasubstance tear of the anterior and middle  fibers of the supraspinatus at the footprint. No muscle bulk loss.   a. Separate low-grade intrasubstance tear of the  supraspinatus/infraspinatus junctional fibers at the footprint.   Discussed /follow-up next week for possible injection  Start medrol pack  Lidocaine patches  Ice PRN  F/u next week        Phone call duration: 14 minutes  Phone call start: 11:13am  Phone call end: 11:27am  Dr Issa

## 2021-02-09 ENCOUNTER — THERAPY VISIT (OUTPATIENT)
Dept: PHYSICAL THERAPY | Facility: CLINIC | Age: 46
End: 2021-02-09
Attending: PHYSICIAN ASSISTANT
Payer: COMMERCIAL

## 2021-02-09 DIAGNOSIS — M62.9 MUSCULOSKELETAL DISORDER INVOLVING UPPER TRAPEZIUS MUSCLE: ICD-10-CM

## 2021-02-09 PROCEDURE — 97161 PT EVAL LOW COMPLEX 20 MIN: CPT | Mod: GP | Performed by: PHYSICAL THERAPIST

## 2021-02-09 PROCEDURE — 97110 THERAPEUTIC EXERCISES: CPT | Mod: GP | Performed by: PHYSICAL THERAPIST

## 2021-02-09 NOTE — PROGRESS NOTES
[facility] Initial Evaluation  Subjective:  The history is provided by the patient. No  was used.   Therapist Generated HPI Evaluation  Problem details: Patient reports the left shoulder pain started about 12/2020 due to overcompensation due to a right shoulder injury.  Has had imaging and is schedule with Dr Issa for an injection..         Type of problem:  Left shoulder.    This is a new condition.  Condition occurred with:  Unknown cause.  Where condition occurred: at home.  Patient reports pain:  Anterior, lateral, posterior and upper trap.  Pain is described as aching, shooting and stabbing and is constant.  Pain radiates to:  Cervical. Pain is the same all the time.  Since onset symptoms are unchanged.  Associated symptoms:  Loss of motion/stiffness and loss of strength. Symptoms are exacerbated by certain positions, lifting, using arm at shoulder level, lying on extremity, using arm behind back and using arm overhead  and relieved by muscle relaxants, analgesics and other (NSAID cream).  Special tests included:  MRI.    Restrictions due to condition include:  Working in normal job without restrictions.  Barriers include:  None as reported by patient.    Patient Health History  Julianna Keli being seen for Left shoulder.     Date of Onset: 12/2020.   Problem occurred: unknown   Pain is reported as 4/10 (up to 8/10) on pain scale.  General health as reported by patient is good.  Pertinent medical history includes: none.   Red flags:  None as reported by patient.      Other surgery history details: Myomectomy.    Current medications:  Muscle relaxants.    Current occupation is .                                       Objective:  System                   Shoulder Evaluation:  ROM:  AROM:    Flexion:  Left:  112    Right:  160  Extension: Left: 28Right: 50  Abduction:  Left: 85   Right:  164    Internal Rotation:  Left:  (L) pocket    Right:  T12  External  Rotation:  Left:  65    Right:  83                      Strength:    Flexion: Left:4-/5    Pain: +    Right: 5/5     Pain:     Abduction:  Left: 3+/5   Pain:++    Right: 5/5     Pain:    Internal Rotation:  Left:4+/5     Pain:    Right: 5/5     Pain:  External Rotation:   Left:4-/5     Pain:   Right:5/5     Pain:                  Mobility Tests:      Glenohumeral posterior left:  Hypomobile  Glenohumeral posterior right:  Hypomobile  Glenohumeral inferior left:  Hypomobile  Glenohumeral inferior right:  Hypomobile                                             General     ROS    Assessment/Plan:    Patient is a 45 year old female with right side shoulder complaints.    Patient has the following significant findings with corresponding treatment plan.                Diagnosis 1:  Right shoulder  Pain -  manual therapy, self management, education and directional preference exercise  Decreased ROM/flexibility - manual therapy and therapeutic exercise  Decreased strength - therapeutic exercise and therapeutic activities  Impaired muscle performance - neuro re-education  Decreased function - therapeutic activities      Previous and current functional limitations:  (See Goal Flow Sheet for this information)    Short term and Long term goals: (See Goal Flow Sheet for this information)     Communication ability:  Patient appears to be able to clearly communicate and understand verbal and written communication and follow directions correctly.  Treatment Explanation - The following has been discussed with the patient:   RX ordered/plan of care  Anticipated outcomes  Possible risks and side effects  This patient would benefit from PT intervention to resume normal activities.   Rehab potential is good.    Frequency:  1 X week, once daily  Duration:  for 8 weeks  Discharge Plan:  Achieve all LTG.  Independent in home treatment program.  Reach maximal therapeutic benefit.    Please refer to the daily flowsheet for treatment today,  total treatment time and time spent performing 1:1 timed codes.

## 2021-02-16 ENCOUNTER — THERAPY VISIT (OUTPATIENT)
Dept: PHYSICAL THERAPY | Facility: CLINIC | Age: 46
End: 2021-02-16
Payer: COMMERCIAL

## 2021-02-16 DIAGNOSIS — M25.512 LEFT SHOULDER PAIN: ICD-10-CM

## 2021-02-16 PROCEDURE — 97110 THERAPEUTIC EXERCISES: CPT | Mod: GP | Performed by: PHYSICAL THERAPIST

## 2021-02-16 PROCEDURE — 97140 MANUAL THERAPY 1/> REGIONS: CPT | Mod: GP | Performed by: PHYSICAL THERAPIST

## 2021-02-23 ENCOUNTER — THERAPY VISIT (OUTPATIENT)
Dept: PHYSICAL THERAPY | Facility: CLINIC | Age: 46
End: 2021-02-23
Payer: COMMERCIAL

## 2021-02-23 DIAGNOSIS — M25.512 LEFT SHOULDER PAIN: ICD-10-CM

## 2021-02-23 PROCEDURE — 97110 THERAPEUTIC EXERCISES: CPT | Mod: GP | Performed by: PHYSICAL THERAPIST

## 2021-02-23 PROCEDURE — 97140 MANUAL THERAPY 1/> REGIONS: CPT | Mod: GP | Performed by: PHYSICAL THERAPIST

## 2021-02-24 ENCOUNTER — TELEPHONE (OUTPATIENT)
Dept: ORTHOPEDICS | Facility: CLINIC | Age: 46
End: 2021-02-24

## 2021-02-24 NOTE — TELEPHONE ENCOUNTER
Health Call Center    Phone Message    May a detailed message be left on voicemail: yes     Reason for Call: Other: Pt called requesting for a cortison injection in shoulder. Pt is a Dr. Issa pt, writer offered 3/1with Dr. Issa but pt states she needs to be seen on Friday 2/26 as it is her day off. Writer did schedule with Dr. Dean as requesting this injection. Please call pt if Dr. Dean is unable to do injection. THank you.      Action Taken: Message routed to:  Adult Clinics: Sports Medicine p [id number]    Travel Screening: Not Applicable

## 2021-02-26 ENCOUNTER — OFFICE VISIT (OUTPATIENT)
Dept: ORTHOPEDICS | Facility: CLINIC | Age: 46
End: 2021-02-26
Payer: COMMERCIAL

## 2021-02-26 DIAGNOSIS — M75.112 INCOMPLETE TEAR OF LEFT ROTATOR CUFF, UNSPECIFIED WHETHER TRAUMATIC: Primary | ICD-10-CM

## 2021-02-26 PROCEDURE — 20611 DRAIN/INJ JOINT/BURSA W/US: CPT | Mod: LT | Performed by: FAMILY MEDICINE

## 2021-02-26 PROCEDURE — 99207 PR DROP WITH A PROCEDURE: CPT | Performed by: FAMILY MEDICINE

## 2021-02-26 RX ADMIN — TRIAMCINOLONE ACETONIDE 40 MG: 40 INJECTION, SUSPENSION INTRA-ARTICULAR; INTRAMUSCULAR at 09:33

## 2021-02-26 NOTE — PROGRESS NOTES
Large Joint Injection/Arthocentesis    Date/Time: 2/26/2021 9:33 AM  Performed by: Kleber Dean DO  Authorized by: Kleber Dean DO     Indications:  Pain  Needle Size:  22 G  Guidance: ultrasound    Location:  Shoulder   Location comment:  Left Shoulder        Medications:  40 mg triamcinolone 40 MG/ML  Procedure discussed: discussed risks, benefits, and alternatives    Consent Given by:  Patient  Prep: patient was prepped and draped in usual sterile fashion     NDC: [address]-8

## 2021-02-26 NOTE — LETTER
2021         RE: Julianna Dickey  [address] [facility] [address]        Dear Colleague,    Thank you for referring your patient, Julianna Dickey, to the [facility] [place]. Please see a copy of my visit note below.    Large Joint Injection/Arthocentesis    Date/Time: 2021 9:33 AM  Performed by: Kleber Dean DO  Authorized by: Kleber Dean DO     Indications:  Pain  Needle Size:  22 G  Guidance: ultrasound    Location:  Shoulder   Location comment:  Left Shoulder        Medications:  40 mg triamcinolone 40 MG/ML  Procedure discussed: discussed risks, benefits, and alternatives    Consent Given by:  Patient  Prep: patient was prepped and draped in usual sterile fashion     NDC: [address]-1          PROCEDURE ENCOUNTER    [facility]  Orthopedics  Kleber Dean DO  2021     Name: Julianna Dickey  MRN: [id number]  Age: 45 year old  : 1975    Referring provider: Dr. Issa  Diagnosis: Incomplete tear of left rotator cuff    Left subacromial Bursa - Ultrasound Guided  The patient was informed of the risks and the benefits of the procedure and a written consent was signed.  The patient s left shoulder was prepped with chlorhexidine in sterile fashion.   40 mg of triamcinolone suspension was drawn up into a 5 mL syringe with 4 mL of 1% lidocaine.  Injection was performed using sterile technique.  Under ultrasound guidance a 1.5-inch 22-gauge needle was used to enter the left subacromial bursa.  Anterolateral approach was used with arm held in Crass position.  Needle placement was visualized and documented with ultrasound.  Ultrasound visualization was necessary to ensure placement in to the bursa and not the rotator cuff tendon which could potentially cause further tendon damage.  Injection performed long axis to the probe.  Injection solution visualized within the joint space.  Images were permanently stored for the patient's record.  There were no  complications. The patient tolerated the procedure well. There was negligible bleeding.   The patient was instructed to ice the shoulder upon leaving clinic and refrain from overuse over the next 3 days.   The patient was instructed to call or go to the emergency room with any unusual pain, swelling, redness, or if otherwise concerned.  Kleber Dean DO [facility]  Primary Care Sports Medicine  [facility]          Again, thank you for allowing me to participate in the care of your patient.        Sincerely,        Kleber Dean DO

## 2021-02-26 NOTE — NURSING NOTE
Julianna Dcikey's chief complaint for this visit includes:  Chief Complaint   Patient presents with     Left Shoulder - Injections     PCP: Liz, Gerry Dunn    Referring Provider:  No referring provider defined for this encounter.    Patient refused vitals    Do you need any medication refills at today's visit? No    Kaylee Hollins MA

## 2021-03-04 RX ORDER — TRIAMCINOLONE ACETONIDE 40 MG/ML
40 INJECTION, SUSPENSION INTRA-ARTICULAR; INTRAMUSCULAR
Status: DISCONTINUED | OUTPATIENT
Start: 2021-02-26 | End: 2024-01-05

## 2021-03-04 NOTE — PROGRESS NOTES
PROCEDURE ENCOUNTER    [facility]  Orthopedics  Kleber Dean DO  2021     Name: Julianna Dickey  MRN: [id number]  Age: 45 year old  : 1975    Referring provider: Dr. Issa  Diagnosis: Incomplete tear of left rotator cuff    Left subacromial Bursa - Ultrasound Guided  The patient was informed of the risks and the benefits of the procedure and a written consent was signed.  The patient s left shoulder was prepped with chlorhexidine in sterile fashion.   40 mg of triamcinolone suspension was drawn up into a 5 mL syringe with 4 mL of 1% lidocaine.  Injection was performed using sterile technique.  Under ultrasound guidance a 1.5-inch 22-gauge needle was used to enter the left subacromial bursa.  Anterolateral approach was used with arm held in Crass position.  Needle placement was visualized and documented with ultrasound.  Ultrasound visualization was necessary to ensure placement in to the bursa and not the rotator cuff tendon which could potentially cause further tendon damage.  Injection performed long axis to the probe.  Injection solution visualized within the joint space.  Images were permanently stored for the patient's record.  There were no complications. The patient tolerated the procedure well. There was negligible bleeding.   The patient was instructed to ice the shoulder upon leaving clinic and refrain from overuse over the next 3 days.   The patient was instructed to call or go to the emergency room with any unusual pain, swelling, redness, or if otherwise concerned.  Kleber Dean DO [facility]  Primary Care Sports Medicine  [facility]

## 2021-04-08 ENCOUNTER — THERAPY VISIT (OUTPATIENT)
Dept: PHYSICAL THERAPY | Facility: CLINIC | Age: 46
End: 2021-04-08
Payer: COMMERCIAL

## 2021-04-08 DIAGNOSIS — M25.512 LEFT SHOULDER PAIN: ICD-10-CM

## 2021-04-08 PROCEDURE — 97140 MANUAL THERAPY 1/> REGIONS: CPT | Mod: GP | Performed by: PHYSICAL THERAPIST

## 2021-04-08 PROCEDURE — 97110 THERAPEUTIC EXERCISES: CPT | Mod: GP | Performed by: PHYSICAL THERAPIST

## 2021-04-23 ENCOUNTER — IMMUNIZATION (OUTPATIENT)
Dept: NURSING | Facility: CLINIC | Age: 46
End: 2021-04-23
Payer: COMMERCIAL

## 2021-04-23 PROCEDURE — 0001A PR COVID VAC PFIZER DIL RECON 30 MCG/0.3 ML IM: CPT

## 2021-04-23 PROCEDURE — 91300 PR COVID VAC PFIZER DIL RECON 30 MCG/0.3 ML IM: CPT

## 2021-05-14 ENCOUNTER — IMMUNIZATION (OUTPATIENT)
Dept: NURSING | Facility: CLINIC | Age: 46
End: 2021-05-14
Attending: INTERNAL MEDICINE
Payer: COMMERCIAL

## 2021-05-14 PROCEDURE — 91300 PR COVID VAC PFIZER DIL RECON 30 MCG/0.3 ML IM: CPT

## 2021-05-14 PROCEDURE — 0002A PR COVID VAC PFIZER DIL RECON 30 MCG/0.3 ML IM: CPT

## 2021-06-12 NOTE — PROGRESS NOTES
"NEUROPSYCHOLOGICAL TELE-HEALTH CONCUSSION CONSULTATION  [facility]    NAME: Julianna Dickey    YOB: 1975   AGE: 45  EDU: 13  DATE OF EVALUATION: 10/14/2020    Video Visit  Julianna Dickey is a 45 y.o. female who is being evaluated via a billable video visit.      The patient has been notified of the following:     \"This video visit will be conducted via a call between you and your provider. We have found that certain health care needs can be provided without the need for an in-person physical exam.  This service lets us provide the care you need with a video conversation. If during the course of the call the physician/provider feels a video visit is not appropriate, you will not be charged for this service.\"    In addition, information was provided about the risks, benefits and limitations of participating in the current evaluation via Tele-Health as well more general explanation of the services to be provided today. She was told we would not be recording the Tele-Health session and Ms. Dickey agreed to not record the session or write down (or otherwise attempt to duplicate or retain) any information from the testing component of the evaluation.    Patient has given verbal consent to a Video visit? Yes  Consent has been obtained for this service by 1 care team member: yes.    Patient would like the video invitation sent by: Text to cell phone: [phone]}    REASON FOR REFERRAL:  Ms. Julianna Dickey is a 45 y.o. female who presents to the [facility] for further evaluation and management of a head injury she sustained on August 31st.  She was referred for neuropsychological consultation by LUPILLO Senior to provide information regarding cognitive and emotional functioning following her mild brain injury.  The circumstances surrounding Ms. Dickey's injury are well-documented in the electronic medical record; therefore, only the most pertinent details will be " "reiterated below.    RELEVANT HISTORY:   Today, Ms. Dickey reported that on August 31st, she had been on her way to work when she struck her head on the car door and felt immediate pain, especially in her left ear.  She denied loss of consciousness, or feeling dazed or even momentarily confused. She indicated that she proceeded to work that day as well as the rest of the week.  She ultimately presented to urgent care due to persisting symptoms and ultimately the ER at the end of September due to dizziness.  She noted that she took a few days off of work following her first family practice visit and then a couple days after her ER visit.    Per records Ms. Dickey  Presented on 9/7/20 to [facility]) \"Last week she was getting into her car and the door came back and hit her in the head which led to an instant headache.\"  They recommended Tylenol and follow-up in primary care.    On September 9th she had an e-visit in family practice clinic and was started on tizanidine and referred to the concussion clinic.  On a September 15 e-visit in family medicine she was provided a letter to hold her from work.  On September 30th she presented to the [facility] ER due to dizziness and feeling \"woozy.\"  An MRI of the brain was completed and described as unremarkable.  She was provided with a note to hold her from work for a few days.    Ms. Dickey was seen by LUPILLO Senior in the [facility] on October 2nd (via Tele-Health visit). She recommended evaluation by physical therapy and neuropsychology as well as a trial of Wellbutrin to help with focus. Ms. Dickey has not yet started physical therapy.     DIAGNOSTIC SUMMARY:  Due to the current COVID-19 pandemic that prevents in-person clinical visits, this assessment was conducted using telehealth methods. The standard administration of these tests involves in-person, face-to-face methods. The full impact of applying " non-standard administration methods via remote technology is not fully appreciated at this time. The diagnostic conclusions and recommendations for treatment provided in this report are being advanced with caution and with these limitations in mind.    An abbreviated neurocognitive evaluation was conducted and Ms. Julianna Dickey was an engaged and cooperative participant. Optimal premorbid abilities were estimated as falling in the low average to average range of functioning and today's results are notable only for mildly impaired verbal learning and phonemic fluency. Her performance otherwise fell fully within normal limits across measures of attention, speed of thinking, semantic fluency, verbal memory and aspects of executive functioning (working memory, complex attention, semantic set shifting). On measures of emotional functioning, she endorsed moderate symptoms of both depression and anxiety. At the present time, there is no consistent evidence of objective cognitive dysfunction but there is evidence of an adverse emotional reaction.      Overall, Ms. Dickey's cognitive profile is largely assessed within normal limits.  There is some evidence of mild cognitive inefficiencies (verbal learning, phonemic fluency); however, performance on these tasks appear to be strongly impacted by anxiety during testing.  Her performance on other measures of attention, language and executive skills were all within normal limits.  Further, memory performance was solidly intact with strong (100% retention).    Based on her description of the events, it is unclear if Ms. Dickey's head injury was even sufficient as to be designated as concussion.  She denied loss or alteration in consciousness.  Even if given the benefit of the doubt and this did in fact represent a concussion, it would be very mild at worst and significant cognitive sequela would not be expected 6 weeks post injury.  Ms. Dickey herself denied any significant cognitive changes  but acknowledged only some difficulties with attention and focus. At this time I strongly suspect that a mood component and particularly anxiety, as well as physical symptoms likely accounts for the experience of cognitive inefficiencies. A trial of Wellbutrin is recommended (she has it but not yet started taking it at the time of my evaluation) as this could very well make a difference for Ms. Dickey. Should mood symptoms persist even with Wellbutrin, consideration could be given to short term psychotherapy.     There is no evidence of significant cognitive impairment at this time and Ms. Dickey is cleared from a cognitive perspective with no further follow-up in neuropsychology needed.  Again, addressing mood symptoms would be my primary recommendation with continued physical therapy as well to help address some of her persistent physical symptoms.  Together these interventions should help Ms. Dickey return to her pre-head injury baseline.    During the feedback session I will meet with Ms. Dickey to discuss her experience with testing, review the results, provide feedback and education, and discuss my recommendations moving forward.    Overall, Ms. 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 Ms. Julianna Dickey is cleared from a cognitive perspective and no further follow-up with this neuropsychologist is indicated at this time. However, if Ms. Dickey should experience any cognitive difficulties or an exacerbation of her functional difficulties in daily life, she is encouraged to contact this clinic again. Today's results can serve as a baseline for future comparison.    DIAGNOSTIC IMPRESSIONS:  No cognitive diagnosis  Adjustment Disorder with Mixed Anxiety and Depressed Mood     RECOMMENDATIONS:  1) Time was spent discussing the pathophysiology of concussion injury, normalizing the patient's experience, and providing  "education about the anticipated recovery trajectory. She was open to education about the fact that people recover from mild traumatic brain injuries and that her cognitive inefficiencies and physical symptoms will not be permanent and are expected to continue to improved steadily in coming weeks. She will benefit from hearing this clear and consistent message about recovery from concussion.     2) We discussed how physical, emotional, and cognitive symptoms are highly interconnected and influence one another. Education was provided on the impact of emotional distress and how that can cause or exacerbate cognitive inefficiencies, physical symptoms (e.g., headaches, nausea, etc.), and sleep disturbances. I reinforced the importance of treating her mood symptoms to help optimize her recovery. She will move forward with a trial of Wellbutrin. In the meantime, the patient may benefit from utilizing relaxation and stress-management strategies in daily life. A handout was provided for the patient's review as part of the AVS.    3) We discussed the fact that total avoidance of screens, reading, and engaging in the normal activities of daily living is not necessary. Education was provided about the fact that some activities may temporarily flare-up her symptoms, but that it is important for her to re-engage in them slowly and steadily so that she may be able to build up her tolerance for normal activities. The one exception is driving, which she should not do if she is experiencing significant physical symptoms/fatigue. We also reviewed the importance of taking rest breaks after persistent engagement in cognitively and physically demanding tasks. We discussed the rule of 50/10, during which 50 minutes of cognitively demanding tasks are followed by a 10 minute break to relax and \"power down\" so to help delay/prevent a flare-up of physical symptoms.    4) We discussed the benefits of compensatory and organizational strategies " to optimize her functioning in daily life. These include utilizing note pads, checklists, to-do lists, a calendar/planner, alarm reminders, a pillbox, a GPS, and maintaining a daily routine and an organized living/work environment.    5) No further follow-up with neuropsychology is needed. However, should Ms. Dickey have any concerns about her cognition in the future, she is welcome to contact our clinic at any time.     Thank you for the opportunity to assist in the evaluation and care of Ms. Dickey. Please do not hesitate to contact me with any questions regarding my findings or recommendations.    --------------------------------EXTENDED REPORT--------------------------------  Verbal consent for today's services was received following the provision of information about the nature of the evaluation, and the opportunity to ask questions.     HISTORY OF PRESENT PROBLEM:  With regard to cognitive symptoms, Ms. Dickey denies any significant difficulties with memory, language, or speed of thinking.  She did, however, report difficulties with attention and focus noting that it is hard for her to stay on task and she feels like she does not complete tasks as efficiently as she once did. She noted in particular at work, it takes her longer to complete tasks, but even so she is still able to get her job done.  She noted also that she is taking 2 classes and she feels it is hard for her to focus, especially given the virtual format.  She is more used to in person classes so the virtual format has been more challenging.  She feels like she is struggling but she has not yet received grades to know for sure.  She noted that she is taking a general psychology course as well as college writing for nurses and this is her first semester back in some time.      In terms of emotional symptoms, Ms. Dickey denies any significant depression or anxiety but did endorse increased irritability since August.  The concussion nurse practitioner, Ms.  "Tung, had recommended a trial of Wellbutrin but Ms. Dickey indicated today that she has not yet started this as there has been \"too much going on.\"    Finally, with regard to physical symptoms, Ms. Dickey reported that her main symptom is persisting headaches.  She noted that they are not as severe as they once were, but they still come and go regularly.  She noted that it feels like a lot of pressure in her head.  She also continues to struggle with light sensitivity but noted that she is now using blue tinted glasses, which have been helping and just this week she has been experimenting with exposing herself to normal levels of light (as opposed to dimming the lights everywhere she can).  She also describes sleep disturbance noting that her sleep \"sucks.\"  She stated that she generally has trouble staying asleep and wakes up frequently throughout the night.  She did note that throughout the day she is a little tired.    As noted above, Ms. Dickey has taken a few days off work but has generally continued to work on a regular basis since her late August head injury.    DIAGNOSTIC STUDIES:  A recent MRI of the brain (9/30/20) was read as normal.      CURRENT MEDICATIONS:    Current Outpatient Medications:      buPROPion (WELLBUTRIN XL) 150 MG 24 hr tablet, Take 1 tablet (150 mg total) by mouth daily., Disp: 30 tablet, Rfl: 1    She has not yet started taking this medication.    PAST MEDICAL HISTORY:  No past medical history on file.    No past surgical history on file.    Ms. Dickey denied any history of prior head injury with loss of consciousness.     Ms. Dickey denied any significant ongoing medical conditions.  She indicated that her surgical history is only significant for a procedure approximately 3 years ago for fibroids.    PSYCHIATRIC HISTORY:  Ms. Dickey denies any history of psychiatric diagnosis, treatment, or hospitalization. She denied suicidal ideation, plan, or intent.  Today she shared that she is little more " irritable, but denied significant depressive or anxiety symptoms.    SUBSTANCE USE HISTORY:  Ms. Dickey denies any history of chemical dependency diagnosis, treatment, or hospitalization. She is a non-smoker.  She stated that she drinks alcohol on a limited basis estimating that she has a half glass of wine maybe once a week.  She denies any recreational drug use.    RELEVANT SOCIAL HISTORY:  Ms. Dickey reported that she was born in [place] and lived there until approximately age 9 at which time she moved to [facility] [place] for 1 year before coming to the [place].  English is her first and only language.    [place] Ms. Dickey reported that she did have some difficulties learning math and received some assistance, but denied any formal learning disorder diagnoses.  She denied any other learning difficulties or any attention or behavior problems in school.  In high school she described herself as an average student.  After high school she took some community college courses in [place] where she was living at the time and then moved to [place] and has taken some classes here at [employer].  She did complete an MA (medical assistant) certificate but no formal degree.     Ms. Dickey had taken some time off from attending college and had just started again this semester.  She hopes to pursue education for nursing or Occupational Therapy.  This is her first semester back in college for some time.    Currently Ms. Dickey is working at [employer] in [employer].  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    Ms. Dickey currently lives with her 3 children as well as her sister and her sister's family and says that there are approximately 7 individuals in the home.    MENTAL STATUS EXAM AND BEHAVIORAL OBSERVATIONS:  Ms. Dickey was alert and oriented to person, place and date. Her mood was euthmyic and her affect was appropriately reactive. Rapport was easily established.  She was  pleasant and cooperative. Rate, prosody, and content of speech were grossly normal. There was no evidence of a tala thought disorder; no hallucinations or delusions were apparent.  Judgment and insight appeared fair.      Ms. Dickey was not aware that this was a 2-hour appointment.  She only had a short time to speak with me at the designated appointment time as she was at work.  She was in a quiet area where she felt comfortable speaking but again only had a limited time thus we only completed the interview. We rescheduled the testing portion of the evaluation for the end of the day (3:30 PM) when she was done with work.    During testing with the examiner, Ms. Dickey was described as pleasant and easy to work with. Testing began approximately 15 minutes late due to initial connectivity issues and her still being at work. She transitioned to her car, where testing was conducted. There was a slight audio to video delay, but there appeared to be no sound issues that negatively impacted testing. The  checked in with her often to ensure she heard the instructions completely prior to administering the task. She was aware of weaknesses on testing, often laughing nervously and stating that she was embarrassed not being able to easily do a task. Encouragement was offered on several occasions in an attempt to reduce her anxiety. Regardless of her level of anxiety, she always persisted and remained positive throughout testing.    TELE-NEUROPSYCHOLOGY LIMITATIONS:  Due to circumstances that prevent in-person clinical visits, this assessment was conducted using telehealth methods (including remote audiovisual presentation of test instructions and test stimuli, and remote observation of performance via audiovisual technologies). The standard administration of these procedures involves in-person, face-to-face methods. The impact of applying non-standard administration methods has been evaluated only in part by scientific  research. While every effort was made to simulate standard assessment practices, the diagnostic conclusions and recommendations for treatment provided in this report are being advanced with these limitations in mind.    TESTS ADMINISTERED:   DKEFS Verbal Fluency, Generalized Anxiety Disorder-7 (TRAY-7), Raman Verbal Learning Testing-R, Form 1 (HVLT-R), Oral Alma Making Test, Patient Health Questionnaire (PHQ-9), WAIS-IV Digit Span), WRAT-4 Word Reading- blue, WMS-III (Mental Control, Information and Orientation)    Oral Nguyễn & Mac (2010) norms were used for Oral Trails    DESCRIPTIVE PERFORMANCE KEY:    Labels for tests with Normal Distributions  Score Label Standard Score %ile Rank   Exceptionally high score  > 130 > 98   Above average score 120-129 91-97   High average score 110-119 75-90   Average score  25-74   Low average score 80-89 9-24   Below average score 70-79 2-8   Exceptionally low score < 70 < 2     Labels for tests with Non-Normal Distributions  Score Label %ile Rank   Within normal expectations/ limits score (WNL) > 24   Low average score 9-24   Below average score 2-8   Exceptionally low score < 2     The following test results utilize score labels as adapted from Forrest Layne, Brandon Tabares, Wendy Do, JANIS Larkin, Claudia Salinas, Jerman Huber & Conference Participants (2020): American Academy of Clinical Neuropsychology consensus conference statement on uniform labeling of performance test scores, The Clinical Neuropsychologist, DOI: 10.1080/[email or web address]    All scores contain some measure of error; scores are reported here as they are obtained by the individual (without reference to the range of error). These are meant as labels and not interpretation of performance. While other relevant comments regarding task performance are provided below, please see the Assessment, Impressions and Diagnostic Summary sections of this  report for interpretation of the scores and the cognitive profile as a whole, including what does and does not constitute impairment.    OPTIMAL PREMORBID INTELLECT:  Optimal premorbid intellectual abilities were estimated as falling in the low average to average range based on Ms. Dickey's educational and occupational histories and performance on tasks least likely to be affected by acquired brain dysfunction (i.e.,  hold tests ).    SUMMARY OF TEST RESULTS:   Mental status exam was measured as a within normal limits score for her age. She was oriented to person, place, time, and date and was able to correctly name the current and previous presidents.    Performance on a measure of basic attention and working memory was assessed as a high average score. This reflected an average score for basic attention skills (LDF = 7) and working memory scores that range from an average score (LDS = 6) to an above average score (LDB = 7).    On a measure of processing speed and cognitive flexibility, she obtained an average score (WMS-III Mental Control). She only made errors on the final item.     Her performance was measured as an average score on a task that required her to quickly recite a simple sequence of numbers. This task was completed without error. Her performance was measured as an average score on a subsequent task that required mental flexibility and set-shifting to quickly alternate between sequencing letters and numbers. This task was completed without error.    Her performance on a measure of phonemic fluency resulted in a below average score. She did appear notably anxious during this task and did not respond for the first 5-10 seconds of each trial. In contrast, semantic fluency was measured as an average score. Semantic set shifting was also measured as an average score.     With regard to learning and memory, she was administered a measure of rote auditory verbal list learning that required her to learn a  series of 12 words over three trials and retain and recall them over a delay. Her initial rate of learning (3,7,8) reflected an exceptionally low score. She retained and recalled 8 words after the delay for a low average score for delayed recall.  Recognition memory was measured as a below average score as she correctly identified 10 of the original words and made 1 false positive error. Of note, initial learning was below expectation given basic attention skills and likely reflects an anxious component.     On the Patient Health Questionnaire-9, a self report measure of depressive symptomatology, she obtained a score of 10, placing her in the range of moderate depression. She denied suicidal ideation.    On the Generalized Anxiety Disorder-7, a self-report measure of anxiety, she obtained a score of 10, placing her in the range of moderate anxiety.     EVALUATION SERVICES AND TIME:   A clinical interview/neurobehavioral status examination was conducted with the patient and documented. I thoroughly reviewed the medical record, selected the neuropsychological test battery, provided supervision to the individual who administered and scored the neuropsychological test battery, interpreted/integrated patient data and test results, engaged in clinical decision making, treatment planning, report writing/preparation and provided and documented interactive feedback of test results on October 21st. The Psychology Intern administered and scored the neuropsychological tests (2 + tests). Please see below for a breakdown of time spent and the associated codes billed for these services. Please note, all charges are filed at the completion of the Episode of Care and associated with the final encounter date (feedback session on October 21st).    Services   Time Spent  CPT Codes   Neurobehavioral Status Exam:  (e.g.,interview via Tele-Health, interpretation, report)   35 minutes   1 x 96116   Neuropsychological Evaluation Services:    (e.g., integration, interpretation, treatment planning, clinical decision making, feedback)   91 minutes   1 x 96132  1 x 96133        Neuropsychological Testing by Psychology Intern:  (e.g., test administration, scoring, 2+ tests administered)   75 minutes   1 x 96138  1 x 96139     Video-Visit Details    Type of service:  Video Visit    Interview with Provider:  Start Time: 1230pm  End Time: 100pm    Testing with Trained Examiner:   Start Time: 340pm  End Time: 435pm    Originating Location (pt. Location): patient's work ([place], [place])    Distant Location (provider location):  Remote work for [facility]    Mode of Communication:  Video Conference via Doximity    Diagnosis:  No cognitive diagnosis  Adjustment Disorder with Mixed Anxiety and Depressed Mood    For diagnostic and coding purposes, Ms. Dikcey has a history of August 31st head injury and was referred for an evaluation of Mild Neurocognitive Disorder due to Traumatic Brain Injury. Feedback of results will be provided via a formal feedback appointment with me on October 21st.      Nichelle Chamberlain, PhD, LP, ABPP  Clinical Neuropsychologist, LP#[id number]  Board Certified in Clinical Neuropsychology    [facility] [address], [address]  [place] [address]  Phone:  [phone]

## 2021-06-12 NOTE — PROGRESS NOTES
"NEUROPSYCHOLOGY TELE-HEALTH FEEDBACK VISIT  [facility]    Video Visit  Julianna Dickey is a 45 y.o. female who is being evaluated via a billable video visit.      The patient has been notified of the following:     \"This video visit will be conducted via a call between you and your provider. We have found that certain health care needs can be provided without the need for an in-person physical exam.  This service lets us provide the care you need with a video conversation. If during the course of the call the physician/provider feels a video visit is not appropriate, you will not be charged for this service.\"    Patient has given verbal consent to a Video visit? Yes  Consent has been obtained for this service by 1 care team member: yes.    Patient would like the video invitation sent by: Text to cell phone: [phone]}    Visit Summary  The purpose of today s appointment was to provide feedback regarding Ms. Dickey's recent neuropsychological Tele-Health consult completed on October 14th. We began the session by discussing her experience during the evaluation. I provided Ms. Dickey  with \feedback regarding her performance on cognitive testing and her pattern of cognitive strengths and weaknesses including isolated impairments in verbal learning and phonemic fluency in the context of an otherwise intact profile. I talked about the role of anxiety during testing and she acknowledged that she was feeling anxious and overwhelmed at times. I shared how this can mirror daily life and used this to emphasize the importance of addressing mood symptoms which I feel are the biggest concern at present. She told me she has now started Wellbutrin and feels it is very helpful. I encouraged her to continue using this as well as to participate in physical therapy as that will help address her physical symptoms. I provided the opportunity for Ms. Dickey to ask any questions about the evaluation. At the end of the " session, she indicated that she understood the results and that I had answered all of her questions. She was encouraged to reach out to me (contact information included in the AVS) should any further questions or concerns arise in the future. Impressions and recommendations from the report were provided as part of the After Visit Summary which she elected to receive via [employer].      Nichelle Chamberlain, PhD, LP, ABPP  Clinical Neuropsychologist, LP#[id number]  Board Certified in Clinical Neuropsychology    [facility] [address], [address]  [place] [address]  Phone:  [phone]    Video-Visit Details    Type of service:  Video Visit  Start Time: 407pm  End Time: 418pm    Originating Location (pt. Location): [place] (car after work)    Distant Location (provider location):  Remote work for [facility]    Mode of Communication:  Video Conference via Doximity      For diagnostic and coding purposes, Ms. Dickey has a history of August 31st head injury and was referred for an evaluation of Mild Neurocognitive Disorder.  My diagnostic impressions from the 10/14/2020 evaluation included    Diagnosis:   No cognitive diagnosis  Adjustment Disorder with Mixed Anxiety and Depressed Mood    As this is the final date for this Episode of Care (initiated on 10/14/2020) all charges for the entire Episode of Care will be filed today. Please see the 10/14/2020 evaluation for a detailed description of codes and services, including services provided today.      In brief:   1 x 96116  1 x 96132  1 x 96133  1 x 96138  1 x 96139

## 2021-06-12 NOTE — PROGRESS NOTES
"Video Visit  Julianna Dickey is a 45 y.o. female who is being evaluated via a billable video visit in light of the ongoing global health crisis (COVID-19) that requires us to abide by social distancing mandates in order to reduce the risk of COVID-19 exposure.      The patient has been notified of following:     \"This virtual visit will be conducted via a video call between you and your physician/provider. We have found that certain health care needs can be provided without the need for a physical exam.  This service lets us provide the care you need with a short video conversation.  If a prescription is necessary we can send it directly to your pharmacy.  If lab work is needed we can place an order for that and you can then stop by our lab to have the test done at a later time.    If during the course of the call the physician/provider feels a video visit is not appropriate, you will not be charged for this service.\"     Patient has given verbal consent to a Video visit? Yes    Julianna Dickey chief complaint is: Post Concussion Syndrome    ALLERGIES  Patient has no allergy information on record.    Current PT  No      Current OT  No      Current ST  No       Current Chiropractic  No  Psychiatrist currently No  Past:  No  Psychologist currently No  Past:  No  Primary: Currently No                     MRI/CT Completed  Yes   MRI on September 30th 2020       Medications  Currently on medication to help you sleep  Yes   Tizanidine 4mg  Mental health dx.   Currently on medication to help with mental health No       Currently on medication for concentration or ADD /ADHD     No         Are you on a controlled substance  No   Who is prescribing     Date of accident: August 31st 2020  Workman's Comp   No   QRC   No    Discussed: No   Present: No     How concussion happened:     Patient states it was raining and windy. She left the front door open and she was trying to get her tennis shoes from the back and when she was about to get back " into the car and she hit her ear badly.        LOC:  No      Did you seek medical attention:  No   When :      MRI/CT Completed  Yes       Injury Description:               Was there a forcible blow to the head?:                No   , just the ear                                            Retrograde Amnesia (loss of memory of events before the injury)?:  No  Anterograde Amnesia (loss of memory of events following injury)?:  No    Number of previous head injuries.        0  Had all previous concussion symptoms resolved   No    Work/School  Currently employed     Yes    Are you considered a essential employee?     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            Yes    Full hours:     Yes    Hours working a week currently  Patient states she was off for a few days because of the concussion  Any days off after concussion?                                Yes, 3 days  The concussion symptoms are limiting her ability to work.  How? Light sensitivity     She is currently living with her family, children, and sister. Children living with you? 2  She denies any developmental problems, learning disabilities, or history of ADHD.     Patient History  Patient was referred to the concussion clinic by Dr. Hood at [place].     Phone Start Time: 7:40am    Phone End Time:  7:56am    Total time of phone call 16 minutes    Number patient would like to use:  [phone]    Mani Walker CMA     Plan:     Neuropsychological assessment   Yes  (2 hours)  PT to evaluate and treat  Yes, taken in [place]  OT to evaluate and treat  No  ST to evaluate and treat  No  Referral to ophthalmology   No  Referral to Neurology        No  Referral to psychology No  Referral to psychiatry  No  Other  Referral   No  MRI/CT ordered today : No  Labs ordered today : No  New medication :  Yes Wellbutrin    Subjective:          HPI     Patient states it was raining and windy. She left the front door open so was trying to hurry. She was in her car trying to get her tennis shoes from the back, when she was about to get back into her car she hit the side of her head on the car       Headaches:  Significant ongoing headaches Yes  Headaches: Intermittently  Improvement :Yes   Current Headache No   Wake with HA  Yes     Worse Headache    8/10           How often: 2 times a day    Average Headache 7/10.    Best Headache 2/10.  Brings on HA:   She doesn't know  Makes symptoms worse  Computers and moving too much  Makes symptoms better. rest  Taking  acetaminophen (Tylenol)        Helpful:  Yes       Physical Symptoms:  Headache-Yes      Resolved No           Improved since accident Improved     Nausea- No         Vomiting - No      Balance problems - Yes       Resolved No Improved since accident Improved     Dizziness - No      Visual problems - No       Fatigue - Yes     Resolved No           Improved since accident Improved    Sensitivity to light - Yes     Resolved No         Improved since accident Improved    Sensitivity to sound - Yes      Resolved No        Improved since accident Improved    Numbness/tingling - No           Cognitive Symptoms  Feeling mentally foggy - No             Feeling slowed down - No           Difficulty Concentrating- Yes        Resolved   No     Improved since accident Improved    Difficulty remembering - No              Emotional Symptoms  Irritability - No        Sadness-   No       More emotional - No       Nervousness/anxiety - No         Psychiatric History:  Anxiety - No  Depression - No  Other mental health dx:  No    Sleep Disorders - No  The patient denies being a victim of abuse.     Ever Hospitalized for mental health:             No  Any thought of hurting self or others now?    No  Any history of hurting self or others?            No    Family Psychiatric History:  Mother's side                              No  Father's side                               No  Adopted                                      No    Sleep History:  Drowsiness- Yes         Resolved No        Improved since accident Improved    Sleep less than usual - No  Sleep more than usual - Yes  Trouble falling asleep - No          Does the patient wake feeling rested - No       Resolved No         Improved since accident Same       Migraine Headaches      Patient history of migraines.    No      Family history of migraines    No    Exertion:         Do the above stated symptoms worsen with physical activity? Yes        Do the above stated symptoms worsen with cognitive activity? Yes          There are no active problems to display for this patient.    No past medical history on file.  No past surgical history on file.  No family history on file.  No current outpatient medications on file.     No current facility-administered medications for this encounter.      Social History     Socioeconomic History     Marital status: Single     Spouse name: Not on file     Number of children: Not on file     Years of education: Not on file     Highest education level: Not on file   Occupational History     Not on file   Social Needs     Financial resource strain: Not on file     Food insecurity     Worry: Not on file     Inability: Not on file     Transportation needs     Medical: Not on file     Non-medical: Not on file   Tobacco Use     Smoking status: Not on file   Substance and Sexual Activity     Alcohol use: Not on file     Drug use: Not on file     Sexual activity: Not on file   Lifestyle     Physical activity     Days per week: Not on file     Minutes per session: Not on file     Stress: Not on file   Relationships     Social connections     Talks on phone: Not on file     Gets together: Not on file     Attends Sikh service:  Not on file     Active member of club or organization: Not on file     Attends meetings of clubs or organizations: Not on file     Relationship status: Not on file     Intimate partner violence     Fear of current or ex partner: Not on file     Emotionally abused: Not on file     Physically abused: Not on file     Forced sexual activity: Not on file   Other Topics Concern     Not on file   Social History Narrative     Not on file       The following portions of the patient's history were reviewed and updated as appropriate: allergies, current medications, past family history, past medical history, past social history, past surgical history and problem list.    Review of Systems  A comprehensive review of systems was negative except for what is noted above.    Objective:       Discussion was held with the patient today regarding concussion in general including types of injury, symptoms that are common, treatment and variability in time to recover. Education about concussion symptoms and length of time it would take the patient to recover was also given to the patient.  I have reassured the patient her symptoms are very common when a concussion is present and will improve with time. We discussed the risks and benefits of the medication including risk of worsening depression with medication adjustments and even the possibility of emergence of suicidal ideations.       Total time spent with the patient today was 60 minutes with greater than 50% of the time spent in counseling and care coordination. The patient will call before then with any questions, concerns or problems. We will assess for the appropriateness of possible psychotropic medication trials/changes. The patient will seek out appropriate emergency services should that become necessary.    Physical Exam:   Neck:  Full ROM  Yes with pain or stiffness Yes    Neurologic:   Mental status: Alert, oriented, thought content appropriate.. Recent and remote memory  grossly intact.  Yes  Speech is clear and fluent with no obvious word finding or paraphasic errors. No    Assessment/Diagnosis managed and treated at today's visit :  Post concussion syndrome  Post concussion headache  Fatigue  Insomnia  Sensitivity to light  Sound sensitivity  Concentration and Attention deficit  Anxiety d/t a medical condition  Irritability  Return to work   Risk for poor school performance      Plan:  Medication Adjustment:  Wellbutrin 150 mg, take one tab PO every AM    Other:   Patient will return to clinic in 3 weeks. They agree to call or return sooner with any questions or concerns.  Risks and benefits were discussed.  Continue with individual therapist if already established.     Continue with the support of the clinic, reassurance, and redirection. Staff monitoring and ongoing assessments per team plan. Current psychotropic medication appears to represent the minimum effective dosage and appears medically necessary. We will continue to monitor and reassess. This team will utilize appropriate emergency services if necessary. I will make myself available if concerns or problems arise.     Mental Status Examination    She is cooperative with questioning. She is fully engaged in conversation today. She is alert and fully oriented. Speech is normal. Thought processes normal with normal prehension and expression. Thoughts are organized and linear. Content is pertinent to the conversation and without evidence of auditory or visual hallucinations. No evidence of any psychosis, No delusional ideation. Gen. fund of knowledge, insight and memory are normal     Consent was obtained for this service by one of our care team members    Video Visit Details    Type of service: Video Visit    Video Start Time: 0810    Video End Time:  0910    Total time of video visit: 60 minutes    Originating Location: Patient's home    Distant Location:  [facility]    Mode of  "Communication: Video Conference via [employer]    Patient Instructions   It was nice speaking with you today for our office visit held through a video visit. The following is a summary of our visit and my recommendations:    How to return to daily activities with concussion:  1. Get lots of rest. Be sure to get enough sleep at night- no late nights. Keep the same bedtime weekdays and weekends.   2. Take daytime naps or rest breaks when you feel tired or fatigued.  3. Limit physical activity as well as activities that require a lot of thinking or concentration. These activities can make symptoms worse and recovery time longer. In some cases, your doctor may prescribe time that you completely eliminate these activities to allow complete \"brain rest.\"  Physical activity includes going to the gym, sports practices, weight-training, running, exercising, heavy lifting, etc.  Thinking and concentration activities (e.g., cell phone texting, computer games, movies, parties, loud music and in severe cases may include limiting your time at work).  4. Drink lots of fluids and eat carbohydrates or protein to main appropriate blood sugar levels.  5. As symptoms decrease, with consent from your doctor, you may begin to gradually return to your daily activities. If symptoms worsen or return, lessen your activities, then try again to increase your activities gradually.   6. During recovery, it is normal to feel frustrated and sad when you do not feel right and you can't be as active as usual.  7. Repeated evaluation of your symptoms is recommended to help guide recovery. Please follow up as recommended by your doctor to ensure a safe and healthy recovery.    Watch for and go to the Emergency Department if you have any of the following symptoms:  Headaches that significantly worsen  Looks very drowsy or can't be awakened  Can't recognize people or places  Worsening neck pain  Seizures  Repeated vomiting  Increasing " confusion or irritability  Unusual behavioral change  Slurred speech  Weakness or numbness in arms/legs  Change in state of consciousness    For more information, please visit on the Internet:  http://www.cdc.gov/concussion/get_help.html   http://www.cdc.gov/concussion/pdf/Facts_about_Concussion_TBI-a.pdf      General Information:  Today you had your appointment with Cora Ruby CNP     If lab work was done today as part of your evaluation you will generally be contacted via My Chart, mail, or phone with the results within 1-5 days. If there is an alarming result we will contact you by phone. Lab results come back at varying times, I generally wait until all labs are resulted before making comments on results. Please note labs are automatically released to My Chart once available.     If you need refills please contact your pharmacist. They will send a refill request to me to review. Please allow 3 business days for us to process all refill requests.     Please call or send a medical message through My Chart, with any questions or concerns    If you need any paperwork completed please fax forms to [phone]. Please state if you would like a copy of the completed paperwork, mailed or faxed back to the patient and a fax number to fax the paperwork to. Please allow up to 10 days for paperwork to be completed.    Cora Ruby CNP

## 2021-06-12 NOTE — PATIENT INSTRUCTIONS - HE
DIAGNOSTIC IMPRESSIONS (from October 14th Neuropsychology Consult):  No cognitive diagnosis  Adjustment Disorder with Mixed Anxiety and Depressed Mood     RECOMMENDATIONS:  1) Time was spent discussing the pathophysiology of concussion injury, normalizing the patient's experience, and providing education about the anticipated recovery trajectory. She was open to education about the fact that people recover from mild traumatic brain injuries and that her cognitive inefficiencies and physical symptoms will not be permanent and are expected to continue to improved steadily in coming weeks. She will benefit from hearing this clear and consistent message about recovery from concussion.     2) We discussed how physical, emotional, and cognitive symptoms are highly interconnected and influence one another. Education was provided on the impact of emotional distress and how that can cause or exacerbate cognitive inefficiencies, physical symptoms (e.g., headaches, nausea, etc.), and sleep disturbances. I reinforced the importance of treating her mood symptoms to help optimize her recovery. She will move forward with a trial of Wellbutrin. In the meantime, the patient may benefit from utilizing relaxation and stress-management strategies in daily life. A handout was provided for the patient's review as part of the AVS.    3) We discussed the fact that total avoidance of screens, reading, and engaging in the normal activities of daily living is not necessary. Education was provided about the fact that some activities may temporarily flare-up her symptoms, but that it is important for her to re-engage in them slowly and steadily so that she may be able to build up her tolerance for normal activities. The one exception is driving, which she should not do if she is experiencing significant physical symptoms/fatigue. We also reviewed the importance of taking rest breaks after persistent engagement in cognitively and physically  "demanding tasks. We discussed the rule of 50/10, during which 50 minutes of cognitively demanding tasks are followed by a 10 minute break to relax and \"power down\" so to help delay/prevent a flare-up of physical symptoms.    4) We discussed the benefits of compensatory and organizational strategies to optimize her functioning in daily life. These include utilizing note pads, checklists, to-do lists, a calendar/planner, alarm reminders, a pillbox, a GPS, and maintaining a daily routine and an organized living/work environment.    5) No further follow-up with neuropsychology is needed. However, should Ms. Dickey have any concerns about her cognition in the future, she is welcome to contact our clinic at any time.   "

## 2021-06-18 ENCOUNTER — OFFICE VISIT (OUTPATIENT)
Dept: OBGYN | Facility: CLINIC | Age: 46
End: 2021-06-18
Payer: COMMERCIAL

## 2021-06-18 VITALS
SYSTOLIC BLOOD PRESSURE: 140 MMHG | WEIGHT: 188 LBS | OXYGEN SATURATION: 100 % | HEART RATE: 92 BPM | DIASTOLIC BLOOD PRESSURE: 84 MMHG | BODY MASS INDEX: 31.28 KG/M2

## 2021-06-18 DIAGNOSIS — N64.4 MASTALGIA: Primary | ICD-10-CM

## 2021-06-18 PROCEDURE — 99213 OFFICE O/P EST LOW 20 MIN: CPT | Performed by: OBSTETRICS & GYNECOLOGY

## 2021-06-18 NOTE — PATIENT INSTRUCTIONS
If you have any questions regarding your visit, Please contact your care team.     "[employer] [facility]: [phone]  Women s Health CLINIC HOURS TELEPHONE NUMBER       Gigi Poole M.D.    Sandra-ALISHA Vang-ALISHA Camp-Medical Assistant    [place]-  Mary Jane-     Monday-[place]  8:00a.m-4:45 p.m  Tuesday-[place]  9:00a.m-4:00 p.m  Wednesday-[place] 8:00a.m-4:45 p.m.  Thursday-[place]  8:00a.m-4:45 p.m.  Friday-[place]  8:00a.m-4:45 p.m. [facility]  [address] [facility] [place] [address] [phone] ask for [facility]  [phone] Fax  Imaging [phone]    [facility]  [address]  [place], [place] [address] [phone]    [facility]  [address] [place]  [place] [address] [phone] ask [facility]  [phone] Fax  Imaging [phone]     Urgent Care locations:    [place]        [place] Monday-Friday  5 pm - 9 pm  Saturday and Sunday   9 am - 5 pm  Monday-Friday   11 am - 9 pm  Saturday and Sunday   9 am - 5 pm   [phone]   If you need a medication refill, please contact your pharmacy. Please allow 3 business days for your refill to be completed.  As always, Thank you for trusting us with your healthcare needs!

## 2021-06-19 NOTE — PROGRESS NOTES
OB-GYN Problem-Oriented Visit or Consultation      Julianna Dickey is a 45 year old year old P 2 who presents with a chief complaint of possible left mastalgia.  Referred by self.  Patient's last menstrual period was 06/10/2021 (exact date).    Assessment:   Encounter Diagnoses   Name Primary?     Mastalgia Yes         Plan and Recommendations: Reassured. Advise follow-up treatment for left shoulder and considering steroid injection. Advise screening mammogram too.   I reviewed the condition, causes, differential diagnosis, prognosis, evaluation and management considerations and options.  Questions answered and information given. See orders.   Observe menses and follow-up is worse.   20 minutes spent on the date of encounter doing chart review, history, examination, discussion with patient, and documentation, and further activities as noted, and review of appropriateness of decision-making for care.      A/P:  Julianna was seen today for breast problem.    Diagnoses and all orders for this visit:    Mastalgia        Gigi Poole MD    HPI:     See prior notes. Had some mastalgia in 2019 that went away. Has rotator cuff injury of left shoulder and noted swelling and discomfort in this region that radiated inferior toward left breast. The breast itself now seems non-tender and without a lump but desires exam.     History of fibroids and myomectomy. Menses q 28-30 days x 4-5 days with heavier flow and some dysmenorrhea for 8 months. Small keloid abd scar unchanged. Central obesity. Contraceptive method is spermicide and NSA.    Past medical, obstetrical, surgical, family and social history reviewed and as noted or updated in chart.     Allergies, meds and supplements are as noted or updated in chart.      ROS:   Systems reviewed include:  constitutional, breast, genitourinary, musculoskeletal, integumentary, psychological, hematologic/lymphatic and endocrine.    These systems were negative for significant symptoms except for the  following additional: none; see HPI.    EXAM:  VS as noted. BP (!) 140/84 (BP Location: Left arm, Patient Position: Chair, Cuff Size: Adult Regular)   Pulse 92   Wt 85.3 kg (188 lb)   LMP 06/10/2021 (Exact Date)   SpO2 100%   Breastfeeding No   BMI 31.28 kg/m    Constitutionally normal.     Neck, Nodes, Breasts are  normal or negative except for, or in particular noting, the following  pertinent findings: mild chest wall swelling superior to left breast toward shoulder.      Gigi Poole MD

## 2021-06-20 NOTE — LETTER
Letter by Cora Ruby FNP at      Author: Cora Ruby FNP Service: -- Author Type: --    Filed:  Date of Service:  Status: (Other)         October 2, 2020     Patient: Julianna Dickey   YOB: 1975   Date of Visit: 10/2/2020       To Whom It May Concern:    It is my medical opinion that Julianna Dickey may return to work on 10/2/2020. Employees recovering from concussions often exhibit cognitive symptoms that make attending work and learning difficult. They may not be able to attend work or only partial days. Some symptoms that could affect performance in the work environment  include: sensitivity to light and noise, headache, trouble focusing, concentrating, or remembering, and difficulty looking at a screen. The accommodations below often help reduce the symptoms and allow them to return to work quicker. Compliance with these accommodations allows the brain to recover more quickly even if it appears the employee is symptom free.     Attendance Restrictions  Full days as tolerated   Other Restrictions:  1. Limit exposure to computer screens  2. Allow patient to take 10 minute breaks every 60 minutes   3. Allow patient to take a break if she starts to have symptoms, If symptoms continue or worsen please allow patient to return home.  4. Allow employee to wear sunglasses and hat to help patient's sensitivity to lights. Remove any overhead lighting and replace with lamps if possible. Move patient to a office if available.  5. Please put a screen filter on the patient's computer   6. Lifting heavier items may cause headaches, if needed please limit amount patient lifts to 10 lbs if symptomatic  7. If patient wakes with severe headache please allow patient to start work later, it symptoms worsen patient should not attempt to work.      If you have any questions or concerns, please don't hesitate to call.    Sincerely,        Electronically signed by LUPILLO Senior

## 2021-06-30 PROBLEM — M25.512 LEFT SHOULDER PAIN: Status: RESOLVED | Noted: 2021-02-16 | Resolved: 2021-06-30

## 2021-06-30 NOTE — PROGRESS NOTES
Discharge Note    Progress reporting period is from initial evaluation date (please see noted date below) to Apr 8, 2021.  Linked Episodes   Type: Episode: Status: Noted: Resolved: Last update: Updated by:   PHYSICAL THERAPY Left shoulder-2/9/2021 Active 2/9/2021 4/8/2021  4:59 PM Desire Luna, [facility]      Comments:       Julianna failed to follow up and current status is unknown.  Please see information below for last relevant information on current status.  Patient seen for 4 visits.    SUBJECTIVE  Subjective changes noted by patient:  Patient reports the shoulder overall is better since her injection.  Pain has increaed since moving last weekend.  Has been work on stretches for her neck and shoulder.   .  Current pain level is 5/10.     Previous pain level was  4/10(upt o 8/10).   Changes in function:  Yes (See Goal flowsheet attached for changes in current functional level)  Adverse reaction to treatment or activity: None    OBJECTIVE  Changes noted in objective findings: AROM Flex 162 Abd 130 IR L3 ER 45 ER 80     ASSESSMENT/PLAN  Diagnosis: L Shoulder   Updated problem list and treatment plan:     STG/LTGs have been met or progress has been made towards goals:  Yes, please see goal flowsheet for most current information  Assessment of Progress: current status is unknown.    Last current status: Pt is progressing as expected   Self Management Plans:  HEP  I have re-evaluated this patient and find that the nature, scope, duration and intensity of the therapy is appropriate for the medical condition of the patient.  Julianna continues to require the following intervention to meet STG and LTG's:  HEP.    Recommendations:  Discharge with current home program.  Patient to follow up with MD as needed.    Please refer to the daily flowsheet for treatment today, total treatment time and time spent performing 1:1 timed codes.

## 2021-07-02 ENCOUNTER — ANCILLARY PROCEDURE (OUTPATIENT)
Dept: MAMMOGRAPHY | Facility: CLINIC | Age: 46
End: 2021-07-02
Payer: COMMERCIAL

## 2021-07-02 DIAGNOSIS — Z12.31 VISIT FOR SCREENING MAMMOGRAM: ICD-10-CM

## 2021-07-02 PROCEDURE — 77063 BREAST TOMOSYNTHESIS BI: CPT | Mod: TC | Performed by: RADIOLOGY

## 2021-07-02 PROCEDURE — 77067 SCR MAMMO BI INCL CAD: CPT | Mod: TC | Performed by: RADIOLOGY

## 2021-07-09 NOTE — PROGRESS NOTES
ESTABLISHED PATIENT FOLLOW-UP:  Follow Up of the Left Shoulder       HISTORY OF PRESENT ILLNESS  Ms. Dickey is a pleasant 45 year old year old female who presents to clinic today for follow-up of left shoulder pain. Last visit on 2/26/2021 patient underwent left subacromial bursa injection which provided 100% relief for 3-4 weeks.     Date of injury/onset: 6 months chronic pain with no injury  Date last seen: 2/26/2021  Following Therapeutic Plan: Did 1 session of physical therapy since last injection, does exercises at home very other day still   Pain: better initially after injection, but now is back to same as before injection.   Function: function same as before injection  Interval History: Since last visit she continues to have pain and burning sensation, worse at work. 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     Review of Systems:    Do you have fever, chills, weight loss? No    Do you have any vision problems? No    Do you have any chest pain or edema? No    Do you have any shortness of breath or wheezing?  No    Do you have stomach problems? No    Do you have any numbness or focal weakness? No    Do you have diabetes? No    Do you have problems with bleeding or clotting? No    Do you have an rashes or other skin lesions? No    MEDICAL HISTORY  Patient Active Problem List   Diagnosis     Sickle cell trait (H)     Chronic pelvic pain in female     Dyspepsia     Gastroesophageal reflux disease with esophagitis     Obesity (BMI 30.0-34.9)     Keloid scar       Current Outpatient Medications   Medication Sig Dispense Refill     acetaminophen (TYLENOL) 500 MG tablet Take 1-2 tablets (500-1,000 mg) by mouth every 6 hours as needed for mild pain 100 tablet 1     diclofenac (VOLTAREN) 1 % topical gel Apply 4 g topically 4 times daily 100 g 4     pantoprazole (PROTONIX) 40 MG EC tablet Take 1 tablet (40 mg) by mouth daily Take 30-60 minutes before a meal. 90 tablet 1     buPROPion (WELLBUTRIN XL) 150 MG 24 hr tablet   "(Patient not taking: Reported on 7/15/2021)       famotidine (PEPCID) 20 MG tablet Take 1 tablet (20 mg) by mouth daily (Patient not taking: Reported on 7/15/2021) 90 tablet 3     methocarbamol (ROBAXIN) 750 MG tablet Take 1 tablet (750 mg) by mouth 3 times daily as needed for muscle spasms (Patient not taking: Reported on 7/15/2021) 30 tablet 1     tiZANidine (ZANAFLEX) 4 MG tablet Take 1 tablet (4 mg) by mouth 3 times daily as needed for muscle spasms (Patient not taking: Reported on 7/15/2021) 30 tablet 1       No Known Allergies    No family history on file.    Additional medical/Social/Surgical histories reviewed in [facility] and updated as appropriate.       PHYSICAL EXAM  BP (!) 142/82   Ht 1.651 m (5' 5\")   Wt 85.7 kg (189 lb)   BMI 31.45 kg/m       General  - normal appearance, in no obvious distress  HEENT  - conjunctivae not injected, moist mucous membranes  CV  - normal radial pulse  Pulm  - normal respiratory pattern, non-labored  Musculoskeletal - shoulder  - inspection: normal bone and joint alignment, no obvious deformity, no scapular winging, no AC step-off  - palpation: mildly tender RC insertion, normal clavicle, non-tender AC  - ROM:  painful and limited flexion and ER at end range, limited IR  - strength: 5/5  strength, 5/5 in all shoulder planes  - special tests:  (-) Speed's  (+) Neer  (+) Hawkin's  (+) Renato's  (-) [place]'s  (-) apprehension  (-) subscap lift-off  Neuro  - no sensory or motor deficit, grossly normal coordination, normal muscle tone  Skin  - no ecchymosis, erythema, warmth, or induration, no obvious rash  Psych  - interactive, appropriate, normal mood and affect    IMAGING :   Impression:  1. Moderate grade intrasubstance tear of the anterior and middle  fibers of the supraspinatus at the footprint. No muscle bulk loss.   a. Separate low-grade intrasubstance tear of the  supraspinatus/infraspinatus junctional fibers at the footprint.      I have personally reviewed the " examination and initial interpretation  and I agree with the findings.     KIMBERLEE FERRARI     ASSESSMENT & PLAN  Ms. Dickey is a 45 year old year old female who presents to clinic today with ongoing pain of left rotator cuff tendons.  Hx of partial thickness cuff tears on MRI 1/22/21.    -Continue with RTC program  -Corticosteroid injection today see procedure note  -Activity modifications  -Cryotherapy  -Follow up if persisting in 3 mos, consider surgical referral     It was a pleasure seeing Julianna.    Subacromial Bursa - Ultrasound Guided  The patient was informed of the risks and the benefits of the procedure and a written consent was signed.  The patient s left shoulder was prepped with chlorhexidine in sterile fashion.   40 mg of kenalog suspension was drawn up into a 5 mL syringe with 4 mL of 1% lidocaine.  Injection was performed using sterile technique.  Under ultrasound guidance a 1.5-inch 22-gauge needle was used to enter the subacromial bursa.  Anterolateral approach was used with arm held in Crass position.  Needle placement was visualized and documented with ultrasound.  Ultrasound visualization was necessary to ensure placement in to the bursa and not the rotator cuff tendon which could potentially cause further tendon damage.  Injection performed long axis to the probe.  Injection solution visualized within the joint space.  Images were permanently stored for the patient's record.  There were no complications. The patient tolerated the procedure well. There was negligible bleeding.   The patient was instructed to ice the shoulder upon leaving clinic and refrain from overuse over the next 3 days.   The patient was instructed to call or go to the emergency room with any unusual pain, swelling, redness, or if otherwise concerned.  Kleber Dean DO, CAQSM  Primary Care Sports Medicine      Large Joint Injection/Arthocentesis: L subacromial bursa    Date/Time: 7/15/2021 4:57 PM  Performed by: Kleber Dean    Authorized by: Kleber Dean DO     Indications:  Pain  Needle Size:  22 G  Guidance: ultrasound    Location:  Shoulder      Site:  L subacromial bursa  Medications:  4 mL lidocaine 1 %; 40 mg triamcinolone 40 MG/ML  Outcome:  Tolerated well, no immediate complications  Procedure discussed: discussed risks, benefits, and alternatives    Consent Given by:  Patient  Prep: patient was prepped and draped in usual sterile fashion

## 2021-07-15 ENCOUNTER — OFFICE VISIT (OUTPATIENT)
Dept: ORTHOPEDICS | Facility: CLINIC | Age: 46
End: 2021-07-15
Payer: COMMERCIAL

## 2021-07-15 VITALS
SYSTOLIC BLOOD PRESSURE: 142 MMHG | DIASTOLIC BLOOD PRESSURE: 82 MMHG | BODY MASS INDEX: 31.49 KG/M2 | WEIGHT: 189 LBS | HEIGHT: 65 IN

## 2021-07-15 DIAGNOSIS — M75.112 INCOMPLETE TEAR OF LEFT ROTATOR CUFF, UNSPECIFIED WHETHER TRAUMATIC: Primary | ICD-10-CM

## 2021-07-15 PROCEDURE — 20611 DRAIN/INJ JOINT/BURSA W/US: CPT | Mod: LT | Performed by: FAMILY MEDICINE

## 2021-07-15 PROCEDURE — 99213 OFFICE O/P EST LOW 20 MIN: CPT | Mod: 25 | Performed by: FAMILY MEDICINE

## 2021-07-15 RX ORDER — TRIAMCINOLONE ACETONIDE 40 MG/ML
40 INJECTION, SUSPENSION INTRA-ARTICULAR; INTRAMUSCULAR
Status: DISCONTINUED | OUTPATIENT
Start: 2021-07-15 | End: 2024-01-05

## 2021-07-15 RX ORDER — LIDOCAINE HYDROCHLORIDE 10 MG/ML
4 INJECTION, SOLUTION INFILTRATION; PERINEURAL
Status: DISCONTINUED | OUTPATIENT
Start: 2021-07-15 | End: 2024-01-05

## 2021-07-15 RX ADMIN — TRIAMCINOLONE ACETONIDE 40 MG: 40 INJECTION, SUSPENSION INTRA-ARTICULAR; INTRAMUSCULAR at 16:57

## 2021-07-15 RX ADMIN — LIDOCAINE HYDROCHLORIDE 4 ML: 10 INJECTION, SOLUTION INFILTRATION; PERINEURAL at 16:57

## 2021-07-15 ASSESSMENT — MIFFLIN-ST. JEOR: SCORE: 1513.18

## 2021-07-15 NOTE — PATIENT INSTRUCTIONS
Julianna to follow up with Primary Care provider regarding elevated blood pressure.    Thank you for choosing [facility]    [facility]  [place] [address] [facility], [address]th [place], [address] [place] [address]   [phone]       APPOINTMENTS: [phone]    CARE QUESTIONS: [phone], #3    BILLING QUESTIONS: [phone]    FAX NUMBER: [phone]          Steroid injection of the left shoulder: subacromial space was performed today in clinic    - Would not soak in a hot tub, bath or swimming pool for 48 hours  - Ok to shower  - Ice today and only do your normal amounts of activity  - The lidocaine (what is giving you pain relief right now) will likely stop working in 1-2 hours.  You will then have pain again, similar to before you received the injection. The corticosteroid will not start working until approximately 1-2 weeks from now.  In a small percentage of people, cortisone can cause flushing/redness in the face. This usually lasts for 1-3 days and resolves. Cool compress and Ibuprofen/Tylenol can help if this happens.

## 2021-07-15 NOTE — LETTER
7/15/2021         RE: Julianna Dickey  [address] [address] [facility] [address]        Dear Colleague,    Thank you for referring your patient, Julianna Dickey, to the [facility] [place]. Please see a copy of my visit note below.    ESTABLISHED PATIENT FOLLOW-UP:  Follow Up of the Left Shoulder       HISTORY OF PRESENT ILLNESS  Ms. Dickey is a pleasant 45 year old year old female who presents to clinic today for follow-up of left shoulder pain. Last visit on 2/26/2021 patient underwent left subacromial bursa injection which provided 100% relief for 3-4 weeks.     Date of injury/onset: 6 months chronic pain with no injury  Date last seen: 2/26/2021  Following Therapeutic Plan: Did 1 session of physical therapy since last injection, does exercises at home very other day still   Pain: better initially after injection, but now is back to same as before injection.   Function: function same as before injection  Interval History: Since last visit she continues to have pain and burning sensation, worse at work. 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     Review of Systems:    Do you have fever, chills, weight loss? No    Do you have any vision problems? No    Do you have any chest pain or edema? No    Do you have any shortness of breath or wheezing?  No    Do you have stomach problems? No    Do you have any numbness or focal weakness? No    Do you have diabetes? No    Do you have problems with bleeding or clotting? No    Do you have an rashes or other skin lesions? No    MEDICAL HISTORY  Patient Active Problem List   Diagnosis     Sickle cell trait (H)     Chronic pelvic pain in female     Dyspepsia     Gastroesophageal reflux disease with esophagitis     Obesity (BMI 30.0-34.9)     Keloid scar       Current Outpatient Medications   Medication Sig Dispense Refill     acetaminophen (TYLENOL) 500 MG tablet Take 1-2 tablets (500-1,000 mg) by mouth every 6 hours as needed for mild pain 100 tablet 1      "diclofenac (VOLTAREN) 1 % topical gel Apply 4 g topically 4 times daily 100 g 4     pantoprazole (PROTONIX) 40 MG EC tablet Take 1 tablet (40 mg) by mouth daily Take 30-60 minutes before a meal. 90 tablet 1     buPROPion (WELLBUTRIN XL) 150 MG 24 hr tablet  (Patient not taking: Reported on 7/15/2021)       famotidine (PEPCID) 20 MG tablet Take 1 tablet (20 mg) by mouth daily (Patient not taking: Reported on 7/15/2021) 90 tablet 3     methocarbamol (ROBAXIN) 750 MG tablet Take 1 tablet (750 mg) by mouth 3 times daily as needed for muscle spasms (Patient not taking: Reported on 7/15/2021) 30 tablet 1     tiZANidine (ZANAFLEX) 4 MG tablet Take 1 tablet (4 mg) by mouth 3 times daily as needed for muscle spasms (Patient not taking: Reported on 7/15/2021) 30 tablet 1       No Known Allergies    No family history on file.    Additional medical/Social/Surgical histories reviewed in [facility] and updated as appropriate.       PHYSICAL EXAM  BP (!) 142/82   Ht 1.651 m (5' 5\")   Wt 85.7 kg (189 lb)   BMI 31.45 kg/m       General  - normal appearance, in no obvious distress  HEENT  - conjunctivae not injected, moist mucous membranes  CV  - normal radial pulse  Pulm  - normal respiratory pattern, non-labored  Musculoskeletal - shoulder  - inspection: normal bone and joint alignment, no obvious deformity, no scapular winging, no AC step-off  - palpation: mildly tender RC insertion, normal clavicle, non-tender AC  - ROM:  painful and limited flexion and ER at end range, limited IR  - strength: 5/5  strength, 5/5 in all shoulder planes  - special tests:  (-) Speed's  (+) Neer  (+) Hawkin's  (+) Renato's  (-) [place]'s  (-) apprehension  (-) subscap lift-off  Neuro  - no sensory or motor deficit, grossly normal coordination, normal muscle tone  Skin  - no ecchymosis, erythema, warmth, or induration, no obvious rash  Psych  - interactive, appropriate, normal mood and affect    IMAGING :   Impression:  1. Moderate grade intrasubstance " tear of the anterior and middle  fibers of the supraspinatus at the footprint. No muscle bulk loss.   a. Separate low-grade intrasubstance tear of the  supraspinatus/infraspinatus junctional fibers at the footprint.      I have personally reviewed the examination and initial interpretation  and I agree with the findings.     KIMBERLEE FERRARI     ASSESSMENT & PLAN  Ms. Dickey is a 45 year old year old female who presents to clinic today with ongoing pain of left rotator cuff tendons.  Hx of partial thickness cuff tears on MRI 1/22/21.    -Continue with RTC program  -Corticosteroid injection today see procedure note  -Activity modifications  -Cryotherapy  -Follow up if persisting in 3 mos, consider surgical referral     It was a pleasure seeing Julianna.    Subacromial Bursa - Ultrasound Guided  The patient was informed of the risks and the benefits of the procedure and a written consent was signed.  The patient s left shoulder was prepped with chlorhexidine in sterile fashion.   40 mg of kenalog suspension was drawn up into a 5 mL syringe with 4 mL of 1% lidocaine.  Injection was performed using sterile technique.  Under ultrasound guidance a 1.5-inch 22-gauge needle was used to enter the subacromial bursa.  Anterolateral approach was used with arm held in Crass position.  Needle placement was visualized and documented with ultrasound.  Ultrasound visualization was necessary to ensure placement in to the bursa and not the rotator cuff tendon which could potentially cause further tendon damage.  Injection performed long axis to the probe.  Injection solution visualized within the joint space.  Images were permanently stored for the patient's record.  There were no complications. The patient tolerated the procedure well. There was negligible bleeding.   The patient was instructed to ice the shoulder upon leaving clinic and refrain from overuse over the next 3 days.   The patient was instructed to call or go to the emergency  room with any unusual pain, swelling, redness, or if otherwise concerned.  Kleber Dean DO, CAQSM  Primary Care Sports Medicine      Large Joint Injection/Arthocentesis: L subacromial bursa    Date/Time: 7/15/2021 4:57 PM  Performed by: Kleber Dean DO  Authorized by: Kleber Dean DO     Indications:  Pain  Needle Size:  22 G  Guidance: ultrasound    Location:  Shoulder      Site:  L subacromial bursa  Medications:  4 mL lidocaine 1 %; 40 mg triamcinolone 40 MG/ML  Outcome:  Tolerated well, no immediate complications  Procedure discussed: discussed risks, benefits, and alternatives    Consent Given by:  Patient  Prep: patient was prepped and draped in usual sterile fashion                Again, thank you for allowing me to participate in the care of your patient.        Sincerely,        Kleber Dean DO

## 2021-08-06 NOTE — PROGRESS NOTES
Outpatient Physical Therapy Discharge Note     Patient: Julianna Dickey  : 1975    Beginning/End Dates of Reporting Period:  10/30/20 to 20    Referring Provider: Sahara Weston MD    Therapy Diagnosis: Concussion     Client Self Report: Patient reports that her headaches are getting a little better, 6/10 on average over the last couple of weeks. Patient reports her coworkers have been out and she has more stress and responsibilities at work, they should be back next week. She has only been able to perform her stretches and exercises about 2x per week.     Objective Measurements:  Objective Measure: CSA  Details:       Goals:  Goal Identifier HEP   Goal Description Patient will demonstrate understanding and compliance to her HEP for continued wellbeing upon discharge from skilled physical therapy.   Target Date 21   Date Met      Progress (detail required for progress note): Not assessed, completed only 1 treatment visit.      Goal Identifier CSA   Goal Description Patient will complete the CSA with a score of <16 to demonstrate decreased overall symptoms for return to work and leisure activities without limitation.   Target Date 21   Date Met      Progress (detail required for progress note): Not assessed, completed only 1 treatment visit.      Goal Identifier DGI   Goal Description Patient will complete the DGI with a score of 20/24 to demonstrate improved balance and decreased risk for falls.   Target Date 21   Date Met      Progress (detail required for progress note): Not assessed, completed only 1 treatment visit.      Goal Identifier mCTSIB   Goal Description Patient will maintain her balance with feet together and eyes closed on foam for 30 seconds to demonstrate improved vestibular function and balance with low vision for safety with going to the bathroom at night.   Target Date 21   Date Met      Progress (detail required for progress note): Not  assessed, completed only 1 treatment visit.        Plan:  Discharge from therapy.    Discharge:    Reason for Discharge: Patient chooses to discontinue therapy.  Patient has failed to schedule further appointments. Patient failed to return to PT, discharge due to noncompliance with goals not assessed    Equipment Issued: None    Discharge Plan: Patient to continue home program.

## 2021-09-08 ENCOUNTER — TRANSFERRED RECORDS (OUTPATIENT)
Dept: HEALTH INFORMATION MANAGEMENT | Facility: CLINIC | Age: 46
End: 2021-09-08

## 2021-09-09 ENCOUNTER — OFFICE VISIT (OUTPATIENT)
Dept: FAMILY MEDICINE | Facility: CLINIC | Age: 46
End: 2021-09-09
Payer: COMMERCIAL

## 2021-09-09 VITALS
BODY MASS INDEX: 29.76 KG/M2 | OXYGEN SATURATION: 100 % | HEIGHT: 65 IN | WEIGHT: 178.6 LBS | RESPIRATION RATE: 20 BRPM | HEART RATE: 64 BPM | TEMPERATURE: 97.3 F | SYSTOLIC BLOOD PRESSURE: 125 MMHG | DIASTOLIC BLOOD PRESSURE: 78 MMHG

## 2021-09-09 DIAGNOSIS — R79.89 ELEVATED SERUM CREATININE: ICD-10-CM

## 2021-09-09 DIAGNOSIS — R42 DIZZINESS: Primary | ICD-10-CM

## 2021-09-09 DIAGNOSIS — R94.31 ABNORMAL ELECTROCARDIOGRAM: ICD-10-CM

## 2021-09-09 PROCEDURE — 99214 OFFICE O/P EST MOD 30 MIN: CPT | Performed by: INTERNAL MEDICINE

## 2021-09-09 RX ORDER — MECLIZINE HYDROCHLORIDE 25 MG/1
25 TABLET ORAL
COMMUNITY
Start: 2021-09-03 | End: 2024-01-05

## 2021-09-09 ASSESSMENT — MIFFLIN-ST. JEOR: SCORE: 1461

## 2021-09-09 ASSESSMENT — PAIN SCALES - GENERAL: PAINLEVEL: NO PAIN (0)

## 2021-09-09 NOTE — PROGRESS NOTES
"    Assessment & Plan     Dizziness  Ongoing for some time  She also has got some ringing sensation in the left ear  Dizziness worse on clinical examination if she is looking towards right  Most probably this is a inner ear issue  She was evaluated for this recently in the ER and was given a Holter monitor  She is currently wearing it but I doubt if this is going to show anything  I think she needs a ENT opinion  - Otolaryngology Referral; Future    Abnormal electrocardiogram  Recent EKG in [facility] was read as having LVH and some left atrial enlargement  I do not have the hard copy with me  I suspect this could be a normal variant because of her race  However she is giving a history of elevated blood pressures on and off  I advised her to keep monitoring blood pressure at home and keep a log of the same and call me back with results in 2 weeks  Get an echo to make sure that there is no LVH  - Echocardiogram Complete; Future    Elevated serum creatinine  This is actually improving  Her GFR is more than 60 currently  She was taking a lot of ibuprofen in the past but stopped it  Told her not to take any ibuprofen whatsoever  We will closely monitor the creatinine  No need for nephrology referral at this point        25 minutes spent on the date of the encounter doing chart review, history and exam, documentation and further activities per the note       BMI:   Estimated body mass index is 29.72 kg/m  as calculated from the following:    Height as of this encounter: 1.651 m (5' 5\").    Weight as of this encounter: 81 kg (178 lb 9.6 oz).           No follow-ups on file.    Hemant Mcneill MD  [facility] SUDEEP Levine is a 46 year old who presents for the following health issues     HPI     ED/UC Followup:    Facility:  [facility] ER   Date of visit: 9/3/2021  Reason for visit: Dizziness   Current Status: Patient states she is feeling much better HOWEVER sometimes the " "dizziness tries to creep up and come back but then it goes away at the same time.            Review of Systems   Constitutional, HEENT, cardiovascular, pulmonary, gi and gu systems are negative, except as otherwise noted.      Objective    /78 (BP Location: Left arm, Patient Position: Sitting, Cuff Size: Adult Regular)   Pulse 64   Temp 97.3  F (36.3  C) (Tympanic)   Resp 20   Ht 1.651 m (5' 5\")   Wt 81 kg (178 lb 9.6 oz)   LMP 09/08/2021 (Exact Date)   SpO2 100%   BMI 29.72 kg/m    Body mass index is 29.72 kg/m .  Physical Exam   GENERAL: healthy, alert and no distress  EYES: Eyes grossly normal to inspection, PERRL and conjunctivae and sclerae normal  HENT: ear canals and TM's normal, nose and mouth without ulcers or lesions  NECK: no adenopathy, no asymmetry, masses, or scars and thyroid normal to palpation  RESP: lungs clear to auscultation - no rales, rhonchi or wheezes  BREAST: normal without masses, tenderness or nipple discharge and no palpable axillary masses or adenopathy  CV: regular rate and rhythm, normal S1 S2, no S3 or S4, no murmur, click or rub, no peripheral edema and peripheral pulses strong  ABDOMEN: soft, nontender, no hepatosplenomegaly, no masses and bowel sounds normal  MS: no gross musculoskeletal defects noted, no edema  SKIN: no suspicious lesions or rashes  NEURO: Normal strength and tone, mentation intact and speech normal  NEURO: No nystagmus however she has transient dizziness on looking towards the right side  PSYCH: mentation appears normal, affect normal/bright        \plain        "

## 2021-09-09 NOTE — PATIENT INSTRUCTIONS
At [facility], we strive to deliver an exceptional experience to you, every time we see you. If you receive a survey, please complete it as we do value your feedback.  If you have MyChart, you can expect to receive results automatically within 24 hours of their completion.  Your provider will send a note interpreting your results as well.   If you do not have MyChart, you should receive your results in about a week by mail.    Your care team:                            Family Medicine Internal Medicine   MD Deepak Brown MD Shantel Branch-Fleming, MD Srinivasa Vaka, MD Katya Belousova, PANOHELIA Ramos, APRN CNP    Placido Nunn, MD Pediatrics   Austin Horowitz, PANOHELIA Costa, CNP MD Aditi Martinez APRN CNP   MD Dayna Anna MD Deborah Mielke, MD Ekaterina Chapman, APRN [facility]      Clinic hours: Monday - Thursday 7 am-6 pm; Fridays 7 am-5 pm.   Urgent care: Monday - Friday 10 am- 8 pm; Saturday and Sunday 9 am-5 pm.    Clinic: [phone]       [facility]: Monday - Thursday 8 am - 7 pm; Friday 8 am - 6 pm  [facility]: [phone]     Use [email or web address] for 24/7 diagnosis and treatment of dozens of conditions.      Patient Education     Dizziness (Vertigo) and Balance Problems: Staying Safe      Replace burned-out light bulbs to keep your home safe and well lit.   Falls or accidents can lead to pain, broken bones, a hospital stay, and a fear of future falls. Protect yourself and others by preparing for episodes. Simple steps can help you stay safe at home and wherever you go.  Lighting  Keep all areas well lit. This helps your eyes send the right signals to the brain. It also makes you less likely to trip and fall. If bright lights make symptoms worse, dim the lights or lie in a dark room until the dizziness passes. Then turn the lights back to their normal  level.  Tips:    Keep a flashlight by the bed.    Place nightlights in bathrooms and hallways.    Replace burned-out bulbs. Or have someone replace them for you.  Preventing falls  To reduce your risk of falling:    Get out of bed or up from a chair slowly.    Wear low-heeled shoes that fit properly and have slip-resistant soles.    Remove throw rugs. Clear clutter from walkways.    Use handrails on stairs. Have handrails installed or adjusted if needed.    Install grab bars in the bathroom. Don't use towel racks for balance.    Use a shower stool. Also put adhesive strips in the shower or on the tub floor.  Going out  With a little time and preparation, you can get around safely.  Tips:    Bring a cane or walking aid if needed.    Give yourself plenty of time in case you start to get dizzy.    Ask your healthcare provider what type of exercise is safe for your condition.    Be patient. If an activity such as walking through a crowded shop causes you stress, you may not be ready for it yet.  Driving  If you become dizzy or disoriented while driving, you could hurt yourself and others. That's why it's best to not drive until symptoms have gone away. In some cases, your license may be temporarily held until it's safe for you to drive again.  For safety:    Ask a friend to drive for you.    Take public transportation.    Walk to stores and other places when you can.     Don't be afraid to ask for help running errands, cooking meals, and doing exercise. Whether it's a friend, loved one, neighbor, or stranger on the street, a little help can make a world of difference. Ask your healthcare provider for a list of community resources if you need help maintaining your independence at home.  [employer] last reviewed this educational content on 1/1/2020 2000-2021 [employer]. All rights reserved. This information is not intended as a substitute for professional medical care. Always follow your healthcare  professional's instructions.           Patient Education     Managing Dizziness (Vertigo) with Medicines    Although medicines can't cure all of your problems, they can help control your symptoms. Your doctor may prescribe medicines for a few weeks and then taper them off. Always take your medicine as prescribed. Never share your medicine with others.   Contact your healthcare provider right away if you have side effects from your medicines. Before using a new over-the-counter medicine, check with your healthcare provider or the pharmacist to be certain there won't be an interaction with other medicines you are taking.   How medicines can help    Treat infection or inflammation. If you have an infection caused by bacteria, your doctor can prescribe antibiotics.    Limit conflicting balance signals. These medicines are often in pill form.    Ease nausea. Suppositories, pills, or shots can reduce vomiting.    Reduce pressure in the canals. Diuretics can be used to treat Ménière's disease. These medicines help your body get rid of extra fluid.    Ease other symptoms. Other medicines can help ease depression and anxiety caused by living with dizziness or fainting.    [employer] last reviewed this educational content on 9/1/2019 2000-2021 [employer]. All rights reserved. This information is not intended as a substitute for professional medical care. Always follow your healthcare professional's instructions.

## 2021-09-14 ENCOUNTER — ANCILLARY PROCEDURE (OUTPATIENT)
Dept: CARDIOLOGY | Facility: CLINIC | Age: 46
End: 2021-09-14
Attending: INTERNAL MEDICINE
Payer: COMMERCIAL

## 2021-09-14 DIAGNOSIS — R94.31 ABNORMAL ELECTROCARDIOGRAM: ICD-10-CM

## 2021-09-14 LAB — LVEF ECHO: NORMAL

## 2021-09-14 PROCEDURE — 93306 TTE W/DOPPLER COMPLETE: CPT | Performed by: INTERNAL MEDICINE

## 2021-09-24 ENCOUNTER — OFFICE VISIT (OUTPATIENT)
Dept: AUDIOLOGY | Facility: CLINIC | Age: 46
End: 2021-09-24
Payer: COMMERCIAL

## 2021-09-24 ENCOUNTER — OFFICE VISIT (OUTPATIENT)
Dept: OTOLARYNGOLOGY | Facility: CLINIC | Age: 46
End: 2021-09-24
Payer: COMMERCIAL

## 2021-09-24 VITALS
HEART RATE: 65 BPM | DIASTOLIC BLOOD PRESSURE: 88 MMHG | RESPIRATION RATE: 16 BRPM | OXYGEN SATURATION: 100 % | SYSTOLIC BLOOD PRESSURE: 137 MMHG

## 2021-09-24 DIAGNOSIS — M62.838 MUSCLE SPASM: ICD-10-CM

## 2021-09-24 DIAGNOSIS — R42 DIZZINESS: Primary | ICD-10-CM

## 2021-09-24 DIAGNOSIS — G43.809 VESTIBULAR MIGRAINE: ICD-10-CM

## 2021-09-24 DIAGNOSIS — H93.12 TINNITUS OF LEFT EAR: ICD-10-CM

## 2021-09-24 DIAGNOSIS — H93.12 TINNITUS, LEFT: ICD-10-CM

## 2021-09-24 PROCEDURE — 99244 OFF/OP CNSLTJ NEW/EST MOD 40: CPT | Performed by: OTOLARYNGOLOGY

## 2021-09-24 PROCEDURE — 92550 TYMPANOMETRY & REFLEX THRESH: CPT | Performed by: AUDIOLOGIST

## 2021-09-24 PROCEDURE — 99207 PR NO CHARGE LOS: CPT | Performed by: AUDIOLOGIST

## 2021-09-24 PROCEDURE — 92557 COMPREHENSIVE HEARING TEST: CPT | Performed by: AUDIOLOGIST

## 2021-09-24 RX ORDER — CYCLOBENZAPRINE HCL 5 MG
5 TABLET ORAL
Qty: 40 TABLET | Refills: 1 | Status: SHIPPED | OUTPATIENT
Start: 2021-09-24 | End: 2022-01-18 | Stop reason: ALTCHOICE

## 2021-09-24 RX ORDER — TOPIRAMATE 25 MG/1
25 TABLET, FILM COATED ORAL 2 TIMES DAILY
Qty: 60 TABLET | Refills: 1 | Status: SHIPPED | OUTPATIENT
Start: 2021-09-24 | End: 2024-01-05

## 2021-09-24 NOTE — PROGRESS NOTES
AUDIOLOGY REPORT:    Patient was referred from ENT by Dr. Bush for audiology evaluation. The patient reports dizziness for the last two and a half weeks. She reports that she also had some dizziness last year. The patient reports sound sensitivity and intermittent tinnitus in the left ear. 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 She notes that that got better. The patient reports that she does not have ear pain, pressure, history of loud noise exposure, or a family history of hearing loss.    Testing:    Otoscopy:   Otoscopic exam indicates partial obstruction with cerumen bilaterally     Tympanograms:    RIGHT: normal eardrum mobility     LEFT:   normal eardrum mobility    Reflexes (reported by stimulus ear):  RIGHT: Ipsilateral is present at normal levels  RIGHT: Contralateral is present at normal levels  LEFT:   Ipsilateral is present at normal levels  LEFT:   Contralateral is present at normal levels    Thresholds:   Pure Tone Thresholds assessed using conventional audiometry with good reliability from 250-8000 Hz bilaterally using insert earphones and circumaural headphones     RIGHT:  normal hearing sensitivity at all tested frequencies     LEFT:    normal hearing sensitivity at all tested frequencies     Speech Reception Threshold:    RIGHT: 10 dB HL    LEFT:   15 dB HL  Results are in agreement with pure tone average.     Word Recognition Score:     RIGHT: 100% at 55 dB HL using NU-6 recorded word list.    LEFT:   96% at 55 dB HL using NU-6 recorded word list.    Discussed results with the patient.     Patient was returned to ENT for follow up.     Hailey Mack, CCC-A  Licensed Audiologist #[id number]  9/24/2021

## 2021-09-24 NOTE — LETTER
9/24/2021         RE: Julianna Dickey  [address] [address] [facility] [address]        Dear Colleague,    Thank you for referring your patient, Julianna Dickey, to the [facility]. Please see a copy of my visit note below.    I am seeing this patient in consultation for dizziness at the request of the provider Hemant Mcneill.      Chief Complaint - Tinnitus    History of Present Illness - Julianna Dickey is a 46 year old female who presents to me today with ringing in the left.  It has been present and noticeable for approximately 10 years. It is worse on the phone. She gets a pressure on the left side. It doesn't bother her at night. No pain. She feels she can hear, but sometimes it is irritating on the left side. She feels since she had an ear flushing in the left many years ago it has bothered her. There is no history of chronic ear disease or ear surgery. With regards to recreational, , and work-related noise exposure she denies. No family history of hearing loss at a young age. She also notes dizziness she describes as comes and goes. It is intermittent, but can last 20 minutes or so. It can happen multiple times for a few days. She feels like the room is spinning or like she is drunk. She gets a headache sometimes. She sits down. It happens more at work. The dizziness can come on unprovoked. Sometimes she is lifting at work, gets headaches, and sometimes gets dizziness. This has been a problem for a few years. Last year was the worse.       Past Medical History -   Patient Active Problem List   Diagnosis     Sickle cell trait (H)     Chronic pelvic pain in female     Dyspepsia     Gastroesophageal reflux disease with esophagitis     Obesity (BMI 30.0-34.9)     Keloid scar       Current Medications -   Current Outpatient Medications:      acetaminophen (TYLENOL) 500 MG tablet, Take 1-2 tablets (500-1,000 mg) by mouth every 6 hours as needed for mild pain, Disp: 100 tablet, Rfl: 1     diclofenac  (VOLTAREN) 1 % topical gel, Apply 4 g topically 4 times daily (Patient not taking: Reported on 9/9/2021), Disp: 100 g, Rfl: 4     famotidine (PEPCID) 20 MG tablet, Take 1 tablet (20 mg) by mouth daily, Disp: 90 tablet, Rfl: 3     meclizine (ANTIVERT) 25 MG tablet, Take 25 mg by mouth, Disp: , Rfl:      pantoprazole (PROTONIX) 40 MG EC tablet, Take 1 tablet (40 mg) by mouth daily Take 30-60 minutes before a meal. (Patient not taking: Reported on 9/9/2021), Disp: 90 tablet, Rfl: 1     tiZANidine (ZANAFLEX) 4 MG tablet, Take 1 tablet (4 mg) by mouth 3 times daily as needed for muscle spasms (Patient not taking: Reported on 7/15/2021), Disp: 30 tablet, Rfl: 1    Current Facility-Administered Medications:      lidocaine 1 % injection 4 mL, 4 mL, , , Kleber Dean A, DO, 4 mL at 07/15/21 1657     triamcinolone (KENALOG-40) injection 40 mg, 40 mg, , , Kleber Dean A, DO, 40 mg at 07/15/21 1657     triamcinolone (KENALOG-40) injection 40 mg, 40 mg, , , Jermaine, Mikey, DO, 40 mg at 02/26/21 0933    Allergies - No Known Allergies    Social History -   Social History     Socioeconomic History     Marital status: Single     Spouse name: no partner     Number of children: 2     Years of education: Not on file     Highest education level: Not on file   Occupational History     Occupation:      Comment: [employer]   Tobacco Use     Smoking status: Never Smoker     Smokeless tobacco: Never Used   Substance and Sexual Activity     Alcohol use: Yes     Comment: social, occasionally     Drug use: No     Sexual activity: Yes     Partners: Male     Birth control/protection: Spermicide   Other Topics Concern     Parent/sibling w/ CABG, MI or angioplasty before 65F 55M? Not Asked   Social History Narrative     Not on file     Social Determinants of Health     Financial Resource Strain:      Difficulty of Paying Living Expenses:    Food Insecurity:      Worried About Running Out of Food in the Last Year:       Ran Out of Food in the Last Year:    Transportation Needs:      Lack of Transportation (Medical):      Lack of Transportation (Non-Medical):    Physical Activity:      Days of Exercise per Week:      Minutes of Exercise per Session:    Stress:      Feeling of Stress :    Social Connections:      Frequency of Communication with Friends and Family:      Frequency of Social Gatherings with Friends and Family:      Attends Mosque Services:      Active Member of Clubs or Organizations:      Attends Club or Organization Meetings:      Marital Status:    Intimate Partner Violence:      Fear of Current or Ex-Partner:      Emotionally Abused:      Physically Abused:      Sexually Abused:        Family History - no family history of migraines.     Review of Systems - As per HPI and PMHx, otherwise 10+ system review is negative.    Physical Exam  /88   Pulse 65   Resp 16   LMP 09/08/2021 (Exact Date)   SpO2 100%   General - The patient is in no distress. Alert and oriented to person and place, answers questions and cooperates with examination appropriately.   Neurologic - CN II-XII are grossly intact. No focal neurologic deficits.   Voice and Breathing - The patient was breathing comfortably without the use of accessory muscles. There was no wheezing, stridor, or stertor.  The patients voice was clear and strong.  Ears - right ear had some wax I cleaned. No edema or erythema in the canals. The tympanic membranes are normal in appearance, bony landmarks are intact.  No retraction, perforation, or masses. No fluid or purulence was seen in the external canal or the middle ear. No evidence of infection of the middle ear or external canal, cerumen was normal in appearance.  Eyes - Extraocular movements intact, and the pupils were reactive to light.  Sclera were not icteric or injected, conjunctiva were pink and moist.  Mouth - Examination of the oral cavity showed pink, healthy oral mucosa. No lesions or  ulcerations noted.  The tongue was mobile and midline. Some wear facets.   Throat - The walls of the oropharynx were smooth, symmetric, and had no lesions or ulcerations.  The uvula was midline on elevation.    Neck - Soft, non-tender. Palpation of the occipital, submental, submandibular, internal jugular chain, and supraclavicular nodes did not demonstrate any abnormal lymph nodes or masses. No parotid masses. Palpation of the thyroid was soft and smooth, with no nodules or goiter appreciated.  The trachea was mobile and midline.      Audiologic Studies - An audiogram and tympanogram were performed today as part of the evaluation and personally reviewed. The tympanogram shows a normal Type A curve, with normal canal volume and middle ear pressure.  There is no sign of eustachian tube dysfunction or middle ear effusion.  The audiogram was normal.       Assessment and Plan - Julianna Dickey is a 46 year old female who presents to me today with left tinnitus, hyperacusis and pain, worse with talking on the phone or putting pressure on the ear. I believe this is TMJ. She chews a lot of gum. I recommend no gum chewing, hot packs, massage, soft diet, and flexeril. She may need a mouth guard at night.     She also has dizzy spells that last 20 minutes, that comes in runs where she has it for a few days. She does get headache with this sometimes. No ear symptoms with this. Hearing test was normal. This seems like vestibular migraine. I recommend topamax. Return in 1 month. Will need CBC, AST, and ALT if she continues this.       Cuco Bush MD  Otolaryngology  [facility]          Again, thank you for allowing me to participate in the care of your patient.        Sincerely,        Cuco Bush MD

## 2021-09-24 NOTE — PATIENT INSTRUCTIONS
TMJ (temporal mandibular joint) disorder  - stop chewing gum  - chew a soft diet for a couple weeks, nothing hard or crunchy such as chewy breads, hard meat, or nuts  - hot packs   - massage  - chew on both sides

## 2021-09-24 NOTE — PROGRESS NOTES
I am seeing this patient in consultation for dizziness at the request of the provider Hemant Mcneill.      Chief Complaint - Tinnitus    History of Present Illness - Julianna Dickey is a 46 year old female who presents to me today with ringing in the left.  It has been present and noticeable for approximately 10 years. It is worse on the phone. She gets a pressure on the left side. It doesn't bother her at night. No pain. She feels she can hear, but sometimes it is irritating on the left side. She feels since she had an ear flushing in the left many years ago it has bothered her. There is no history of chronic ear disease or ear surgery. With regards to recreational, , and work-related noise exposure she denies. No family history of hearing loss at a young age. She also notes dizziness she describes as comes and goes. It is intermittent, but can last 20 minutes or so. It can happen multiple times for a few days. She feels like the room is spinning or like she is drunk. She gets a headache sometimes. She sits down. It happens more at work. The dizziness can come on unprovoked. Sometimes she is lifting at work, gets headaches, and sometimes gets dizziness. This has been a problem for a few years. Last year was the worse.       Past Medical History -   Patient Active Problem List   Diagnosis     Sickle cell trait (H)     Chronic pelvic pain in female     Dyspepsia     Gastroesophageal reflux disease with esophagitis     Obesity (BMI 30.0-34.9)     Keloid scar       Current Medications -   Current Outpatient Medications:      acetaminophen (TYLENOL) 500 MG tablet, Take 1-2 tablets (500-1,000 mg) by mouth every 6 hours as needed for mild pain, Disp: 100 tablet, Rfl: 1     diclofenac (VOLTAREN) 1 % topical gel, Apply 4 g topically 4 times daily (Patient not taking: Reported on 9/9/2021), Disp: 100 g, Rfl: 4     famotidine (PEPCID) 20 MG tablet, Take 1 tablet (20 mg) by mouth daily, Disp: 90 tablet, Rfl: 3     meclizine  (ANTIVERT) 25 MG tablet, Take 25 mg by mouth, Disp: , Rfl:      pantoprazole (PROTONIX) 40 MG EC tablet, Take 1 tablet (40 mg) by mouth daily Take 30-60 minutes before a meal. (Patient not taking: Reported on 9/9/2021), Disp: 90 tablet, Rfl: 1     tiZANidine (ZANAFLEX) 4 MG tablet, Take 1 tablet (4 mg) by mouth 3 times daily as needed for muscle spasms (Patient not taking: Reported on 7/15/2021), Disp: 30 tablet, Rfl: 1    Current Facility-Administered Medications:      lidocaine 1 % injection 4 mL, 4 mL, , , Supik, Mikey, DO, 4 mL at 07/15/21 1657     triamcinolone (KENALOG-40) injection 40 mg, 40 mg, , , Supik, Mikey, DO, 40 mg at 07/15/21 1657     triamcinolone (KENALOG-40) injection 40 mg, 40 mg, , , Supik, Mikey, DO, 40 mg at 02/26/21 0933    Allergies - No Known Allergies    Social History -   Social History     Socioeconomic History     Marital status: Single     Spouse name: no partner     Number of children: 2     Years of education: Not on file     Highest education level: Not on file   Occupational History     Occupation:      Comment: [employer] in [place]   Tobacco Use     Smoking status: Never Smoker     Smokeless tobacco: Never Used   Substance and Sexual Activity     Alcohol use: Yes     Comment: social, occasionally     Drug use: No     Sexual activity: Yes     Partners: Male     Birth control/protection: Spermicide   Other Topics Concern     Parent/sibling w/ CABG, MI or angioplasty before 65F 55M? Not Asked   Social History Narrative     Not on file     Social Determinants of Health     Financial Resource Strain:      Difficulty of Paying Living Expenses:    Food Insecurity:      Worried About Running Out of Food in the Last Year:      Ran Out of Food in the Last Year:    Transportation Needs:      Lack of Transportation (Medical):      Lack of Transportation (Non-Medical):    Physical Activity:      Days of Exercise per Week:      Minutes of Exercise per Session:     Stress:      Feeling of Stress :    Social Connections:      Frequency of Communication with Friends and Family:      Frequency of Social Gatherings with Friends and Family:      Attends Rastafari Services:      Active Member of Clubs or Organizations:      Attends Club or Organization Meetings:      Marital Status:    Intimate Partner Violence:      Fear of Current or Ex-Partner:      Emotionally Abused:      Physically Abused:      Sexually Abused:        Family History - no family history of migraines.     Review of Systems - As per HPI and PMHx, otherwise 10+ system review is negative.    Physical Exam  /88   Pulse 65   Resp 16   LMP 09/08/2021 (Exact Date)   SpO2 100%   General - The patient is in no distress. Alert and oriented to person and place, answers questions and cooperates with examination appropriately.   Neurologic - CN II-XII are grossly intact. No focal neurologic deficits.   Voice and Breathing - The patient was breathing comfortably without the use of accessory muscles. There was no wheezing, stridor, or stertor.  The patients voice was clear and strong.  Ears - right ear had some wax I cleaned. No edema or erythema in the canals. The tympanic membranes are normal in appearance, bony landmarks are intact.  No retraction, perforation, or masses. No fluid or purulence was seen in the external canal or the middle ear. No evidence of infection of the middle ear or external canal, cerumen was normal in appearance.  Eyes - Extraocular movements intact, and the pupils were reactive to light.  Sclera were not icteric or injected, conjunctiva were pink and moist.  Mouth - Examination of the oral cavity showed pink, healthy oral mucosa. No lesions or ulcerations noted.  The tongue was mobile and midline. Some wear facets.   Throat - The walls of the oropharynx were smooth, symmetric, and had no lesions or ulcerations.  The uvula was midline on elevation.    Neck - Soft, non-tender. Palpation of  the occipital, submental, submandibular, internal jugular chain, and supraclavicular nodes did not demonstrate any abnormal lymph nodes or masses. No parotid masses. Palpation of the thyroid was soft and smooth, with no nodules or goiter appreciated.  The trachea was mobile and midline.      Audiologic Studies - An audiogram and tympanogram were performed today as part of the evaluation and personally reviewed. The tympanogram shows a normal Type A curve, with normal canal volume and middle ear pressure.  There is no sign of eustachian tube dysfunction or middle ear effusion.  The audiogram was normal.       Assessment and Plan - Julianna Dickey is a 46 year old female who presents to me today with left tinnitus, hyperacusis and pain, worse with talking on the phone or putting pressure on the ear. I believe this is TMJ. She chews a lot of gum. I recommend no gum chewing, hot packs, massage, soft diet, and flexeril. She may need a mouth guard at night.     She also has dizzy spells that last 20 minutes, that comes in runs where she has it for a few days. She does get headache with this sometimes. No ear symptoms with this. Hearing test was normal. This seems like vestibular migraine. I recommend topamax. Return in 1 month. Will need CBC, AST, and ALT if she continues this.       Cuco Bush MD  Otolaryngology  [facility]

## 2021-09-26 ENCOUNTER — HEALTH MAINTENANCE LETTER (OUTPATIENT)
Age: 46
End: 2021-09-26

## 2021-10-24 NOTE — PROGRESS NOTES
Chief Complaint - Tinnitus recheck    History of Present Illness - Julianna Dickey is a 46 year old female who returns to me today with ringing in both ears. I saw her for this 1 month ago. It is a little better. It has been present and noticeable for approximately 10 years. It is worse on the phone. She does a lot at work. It doesn't bother her at night. No pain. She feels she can hear, but sometimes it is irritating on the left side. She feels since she had an ear flushing in the left many years ago it has bothered her. There is no history of chronic ear disease or ear surgery. With regards to recreational, , and work-related noise exposure she denies. No family history of hearing loss at a young age. She also notes dizziness she describes as comes and goes. It is intermittent, but can last 20 minutes or so. It can happen multiple times for a few days. She feels like the room is spinning or like she is drunk. this is better. She gets a headache sometimes. She sits down. It happens more at work. The dizziness can come on unprovoked. Sometimes she is lifting at work, gets headaches, and sometimes gets dizziness. This has been a problem for a few years. Last year was the worse.  I was concerned for TMJ last visit as the cause of worsening tinnitus. I gave her topamax, she only took it once. She doesn't think it is her jaw.     Past Medical History -   Patient Active Problem List   Diagnosis     Sickle cell trait (H)     Chronic pelvic pain in female     Dyspepsia     Gastroesophageal reflux disease with esophagitis     Obesity (BMI 30.0-34.9)     Keloid scar       Current Medications -   Current Outpatient Medications:      acetaminophen (TYLENOL) 500 MG tablet, Take 1-2 tablets (500-1,000 mg) by mouth every 6 hours as needed for mild pain, Disp: 100 tablet, Rfl: 1     cyclobenzaprine (FLEXERIL) 5 MG tablet, Take 1 tablet (5 mg) by mouth nightly as needed for muscle spasms, Disp: 40 tablet, Rfl: 1     diclofenac  (VOLTAREN) 1 % topical gel, Apply 4 g topically 4 times daily (Patient not taking: Reported on 9/9/2021), Disp: 100 g, Rfl: 4     famotidine (PEPCID) 20 MG tablet, Take 1 tablet (20 mg) by mouth daily, Disp: 90 tablet, Rfl: 3     meclizine (ANTIVERT) 25 MG tablet, Take 25 mg by mouth, Disp: , Rfl:      pantoprazole (PROTONIX) 40 MG EC tablet, Take 1 tablet (40 mg) by mouth daily Take 30-60 minutes before a meal. (Patient not taking: Reported on 9/9/2021), Disp: 90 tablet, Rfl: 1     tiZANidine (ZANAFLEX) 4 MG tablet, Take 1 tablet (4 mg) by mouth 3 times daily as needed for muscle spasms (Patient not taking: Reported on 7/15/2021), Disp: 30 tablet, Rfl: 1     topiramate (TOPAMAX) 25 MG tablet, Take 1 tablet (25 mg) by mouth 2 times daily, Disp: 60 tablet, Rfl: 1    Current Facility-Administered Medications:      lidocaine 1 % injection 4 mL, 4 mL, , , Kleber Dean A, DO, 4 mL at 07/15/21 1657     triamcinolone (KENALOG-40) injection 40 mg, 40 mg, , , Jermaine, Mikey, DO, 40 mg at 07/15/21 1657     triamcinolone (KENALOG-40) injection 40 mg, 40 mg, , , Jermaine, Mikey, DO, 40 mg at 02/26/21 0933    Allergies - No Known Allergies    Social History -   Social History     Socioeconomic History     Marital status: Single     Spouse name: no partner     Number of children: 2     Years of education: Not on file     Highest education level: Not on file   Occupational History     Occupation: materials tech     Comment: [employer] in [place]   Tobacco Use     Smoking status: Never Smoker     Smokeless tobacco: Never Used   Substance and Sexual Activity     Alcohol use: Yes     Comment: social, occasionally     Drug use: No     Sexual activity: Yes     Partners: Male     Birth control/protection: Spermicide   Other Topics Concern     Parent/sibling w/ CABG, MI or angioplasty before 65F 55M? Not Asked   Social History Narrative     Not on file     Social Determinants of Health     Financial Resource Strain:       Difficulty of Paying Living Expenses:    Food Insecurity:      Worried About Running Out of Food in the Last Year:      Ran Out of Food in the Last Year:    Transportation Needs:      Lack of Transportation (Medical):      Lack of Transportation (Non-Medical):    Physical Activity:      Days of Exercise per Week:      Minutes of Exercise per Session:    Stress:      Feeling of Stress :    Social Connections:      Frequency of Communication with Friends and Family:      Frequency of Social Gatherings with Friends and Family:      Attends Jewish Services:      Active Member of Clubs or Organizations:      Attends Club or Organization Meetings:      Marital Status:    Intimate Partner Violence:      Fear of Current or Ex-Partner:      Emotionally Abused:      Physically Abused:      Sexually Abused:        Family History - no family history of migraines.       Physical Exam  General - The patient is in no distress. Alert and oriented to person and place, answers questions and cooperates with examination appropriately.   Neurologic - CN II-XII are grossly intact. No focal neurologic deficits.   Voice and Breathing - The patient was breathing comfortably without the use of accessory muscles. There was no wheezing, stridor, or stertor.  The patients voice was clear and strong.  Ears - No edema or erythema in the canals. The tympanic membranes are normal in appearance, bony landmarks are intact.  No retraction, perforation, or masses. No fluid or purulence was seen in the external canal or the middle ear. No evidence of infection of the middle ear or external canal, cerumen was normal in appearance.  Neck - Soft, non-tender. Palpation of the occipital, submental, submandibular, internal jugular chain, and supraclavicular nodes did not demonstrate any abnormal lymph nodes or masses. No parotid masses. Palpation of the thyroid was soft and smooth, with no nodules or goiter appreciated.  The trachea was mobile and  midline.      Assessment and Plan - Julianna Dickey is a 46 year old female who returns to me today with left tinnitus, hyperacusis and pain, worse with talking on the phone or putting pressure on the ear. She doesn't think it is TMJ. However, she has a lot of left shoulder and neck pain from heavy lifting. I'll have her try Robaxin. She can consider PT.     She also has dizzy spells that last 20 minutes, that comes in runs where she has it for a few days. She does get headache with this sometimes. No ear symptoms with this. Hearing test was normal. This seems like vestibular migraine. I recommend topamax. She didn't try this yet, and I encouraged her to try this more. Return in 1 month. Will need CBC, AST, and ALT if she continues topamax.       Cuco Bush MD  Otolaryngology  [facility]

## 2021-10-29 ENCOUNTER — OFFICE VISIT (OUTPATIENT)
Dept: OTOLARYNGOLOGY | Facility: CLINIC | Age: 46
End: 2021-10-29
Payer: COMMERCIAL

## 2021-10-29 VITALS
SYSTOLIC BLOOD PRESSURE: 125 MMHG | OXYGEN SATURATION: 100 % | RESPIRATION RATE: 16 BRPM | HEART RATE: 72 BPM | DIASTOLIC BLOOD PRESSURE: 81 MMHG

## 2021-10-29 DIAGNOSIS — H93.12 TINNITUS, LEFT: Primary | ICD-10-CM

## 2021-10-29 DIAGNOSIS — M62.838 MUSCLE SPASM: ICD-10-CM

## 2021-10-29 PROCEDURE — 99213 OFFICE O/P EST LOW 20 MIN: CPT | Performed by: OTOLARYNGOLOGY

## 2021-10-29 RX ORDER — METHOCARBAMOL 750 MG/1
750 TABLET, FILM COATED ORAL 4 TIMES DAILY PRN
Qty: 40 TABLET | Refills: 1 | Status: SHIPPED | OUTPATIENT
Start: 2021-10-29 | End: 2024-01-05

## 2021-10-29 NOTE — LETTER
10/29/2021         RE: Julianna Dickey  [id number] [address] [id number]        Dear Colleague,    Thank you for referring your patient, Julianna Dickey, to the [facility]. Please see a copy of my visit note below.    Chief Complaint - Tinnitus recheck    History of Present Illness - Julianna Dickey is a 46 year old female who returns to me today with ringing in both ears. I saw her for this 1 month ago. It is a little better. It has been present and noticeable for approximately 10 years. It is worse on the phone. She does a lot at work. It doesn't bother her at night. No pain. She feels she can hear, but sometimes it is irritating on the left side. She feels since she had an ear flushing in the left many years ago it has bothered her. There is no history of chronic ear disease or ear surgery. With regards to recreational, , and work-related noise exposure she denies. No family history of hearing loss at a young age. She also notes dizziness she describes as comes and goes. It is intermittent, but can last 20 minutes or so. It can happen multiple times for a few days. She feels like the room is spinning or like she is drunk. this is better. She gets a headache sometimes. She sits down. It happens more at work. The dizziness can come on unprovoked. Sometimes she is lifting at work, gets headaches, and sometimes gets dizziness. This has been a problem for a few years. Last year was the worse.  I was concerned for TMJ last visit as the cause of worsening tinnitus. I gave her topamax, she only took it once. She doesn't think it is her jaw.     Past Medical History -   Patient Active Problem List   Diagnosis     Sickle cell trait (H)     Chronic pelvic pain in female     Dyspepsia     Gastroesophageal reflux disease with esophagitis     Obesity (BMI 30.0-34.9)     Keloid scar       Current Medications -   Current Outpatient Medications:      acetaminophen (TYLENOL) 500 MG tablet, Take 1-2 tablets  (500-1,000 mg) by mouth every 6 hours as needed for mild pain, Disp: 100 tablet, Rfl: 1     cyclobenzaprine (FLEXERIL) 5 MG tablet, Take 1 tablet (5 mg) by mouth nightly as needed for muscle spasms, Disp: 40 tablet, Rfl: 1     diclofenac (VOLTAREN) 1 % topical gel, Apply 4 g topically 4 times daily (Patient not taking: Reported on 9/9/2021), Disp: 100 g, Rfl: 4     famotidine (PEPCID) 20 MG tablet, Take 1 tablet (20 mg) by mouth daily, Disp: 90 tablet, Rfl: 3     meclizine (ANTIVERT) 25 MG tablet, Take 25 mg by mouth, Disp: , Rfl:      pantoprazole (PROTONIX) 40 MG EC tablet, Take 1 tablet (40 mg) by mouth daily Take 30-60 minutes before a meal. (Patient not taking: Reported on 9/9/2021), Disp: 90 tablet, Rfl: 1     tiZANidine (ZANAFLEX) 4 MG tablet, Take 1 tablet (4 mg) by mouth 3 times daily as needed for muscle spasms (Patient not taking: Reported on 7/15/2021), Disp: 30 tablet, Rfl: 1     topiramate (TOPAMAX) 25 MG tablet, Take 1 tablet (25 mg) by mouth 2 times daily, Disp: 60 tablet, Rfl: 1    Current Facility-Administered Medications:      lidocaine 1 % injection 4 mL, 4 mL, , , Kleber Dean A, DO, 4 mL at 07/15/21 1657     triamcinolone (KENALOG-40) injection 40 mg, 40 mg, , , Pankajik, Mikey, DO, 40 mg at 07/15/21 1657     triamcinolone (KENALOG-40) injection 40 mg, 40 mg, , , Supik, Mikey, DO, 40 mg at 02/26/21 0933    Allergies - No Known Allergies    Social History -   Social History     Socioeconomic History     Marital status: Single     Spouse name: no partner     Number of children: 2     Years of education: Not on file     Highest education level: Not on file   Occupational History     Occupation: materials tech     Comment: [employer] in [place]   Tobacco Use     Smoking status: Never Smoker     Smokeless tobacco: Never Used   Substance and Sexual Activity     Alcohol use: Yes     Comment: social, occasionally     Drug use: No     Sexual activity: Yes     Partners: Male     Birth  control/protection: Spermicide   Other Topics Concern     Parent/sibling w/ CABG, MI or angioplasty before 65F 55M? Not Asked   Social History Narrative     Not on file     Social Determinants of Health     Financial Resource Strain:      Difficulty of Paying Living Expenses:    Food Insecurity:      Worried About Running Out of Food in the Last Year:      Ran Out of Food in the Last Year:    Transportation Needs:      Lack of Transportation (Medical):      Lack of Transportation (Non-Medical):    Physical Activity:      Days of Exercise per Week:      Minutes of Exercise per Session:    Stress:      Feeling of Stress :    Social Connections:      Frequency of Communication with Friends and Family:      Frequency of Social Gatherings with Friends and Family:      Attends Protestant Services:      Active Member of Clubs or Organizations:      Attends Club or Organization Meetings:      Marital Status:    Intimate Partner Violence:      Fear of Current or Ex-Partner:      Emotionally Abused:      Physically Abused:      Sexually Abused:        Family History - no family history of migraines.       Physical Exam  General - The patient is in no distress. Alert and oriented to person and place, answers questions and cooperates with examination appropriately.   Neurologic - CN II-XII are grossly intact. No focal neurologic deficits.   Voice and Breathing - The patient was breathing comfortably without the use of accessory muscles. There was no wheezing, stridor, or stertor.  The patients voice was clear and strong.  Ears - No edema or erythema in the canals. The tympanic membranes are normal in appearance, bony landmarks are intact.  No retraction, perforation, or masses. No fluid or purulence was seen in the external canal or the middle ear. No evidence of infection of the middle ear or external canal, cerumen was normal in appearance.  Neck - Soft, non-tender. Palpation of the occipital, submental, submandibular,  internal jugular chain, and supraclavicular nodes did not demonstrate any abnormal lymph nodes or masses. No parotid masses. Palpation of the thyroid was soft and smooth, with no nodules or goiter appreciated.  The trachea was mobile and midline.      Assessment and Plan - Julianna Dickey is a 46 year old female who returns to me today with left tinnitus, hyperacusis and pain, worse with talking on the phone or putting pressure on the ear. She doesn't think it is TMJ. However, she has a lot of left shoulder and neck pain from heavy lifting. I'll have her try Robaxin. She can consider PT.     She also has dizzy spells that last 20 minutes, that comes in runs where she has it for a few days. She does get headache with this sometimes. No ear symptoms with this. Hearing test was normal. This seems like vestibular migraine. I recommend topamax. She didn't try this yet, and I encouraged her to try this more. Return in 1 month. Will need CBC, AST, and ALT if she continues topamax.       Cuco Bush MD  Otolaryngology  [facility]          Again, thank you for allowing me to participate in the care of your patient.        Sincerely,        Cuco Bush MD

## 2021-12-10 ENCOUNTER — OFFICE VISIT (OUTPATIENT)
Dept: ORTHOPEDICS | Facility: CLINIC | Age: 46
End: 2021-12-10
Payer: COMMERCIAL

## 2021-12-10 DIAGNOSIS — M75.112 INCOMPLETE TEAR OF LEFT ROTATOR CUFF, UNSPECIFIED WHETHER TRAUMATIC: Primary | ICD-10-CM

## 2021-12-10 PROCEDURE — 20611 DRAIN/INJ JOINT/BURSA W/US: CPT | Mod: LT | Performed by: FAMILY MEDICINE

## 2021-12-10 PROCEDURE — 99207 PR DROP WITH A PROCEDURE: CPT | Performed by: FAMILY MEDICINE

## 2021-12-10 RX ORDER — METHYLPREDNISOLONE ACETATE 40 MG/ML
40 INJECTION, SUSPENSION INTRA-ARTICULAR; INTRALESIONAL; INTRAMUSCULAR; SOFT TISSUE
Status: DISCONTINUED | OUTPATIENT
Start: 2021-12-10 | End: 2024-01-05

## 2021-12-10 RX ADMIN — METHYLPREDNISOLONE ACETATE 40 MG: 40 INJECTION, SUSPENSION INTRA-ARTICULAR; INTRALESIONAL; INTRAMUSCULAR; SOFT TISSUE at 09:33

## 2021-12-10 NOTE — PROGRESS NOTES
Julianna has chronic left shoulder pain.  She is here for a left subacromial bursa injection.  Her last injection was in July and did help her for quite a few months.        [facility] FOLLOW-UP VISIT 12/10/2021    Julianna Dickey's chief complaint for this visit includes:  Chief Complaint   Patient presents with     Consult     left shoulder pain, last injection subacromial bursa injection done 7/2021 helped      PCP: Liz, Gerry Dunn    Interval History:     Follow up reason: left shoulder pain     Following Therapeutic Plan: Yes     Pain: Worsening    Function: Worsening    Interval History: last injection in July helped for over 3 months      Medical History:    Any recent changes to your medical history? No    Any new medication prescribed since last visit? No    Review of Systems:    Do you have fever, chills, weight loss? No    Do you have any vision problems? No    Do you have any chest pain or edema? No    Do you have any shortness of breath or wheezing?  No    Do you have stomach problems? No    Do you have any urinary track issues? No    Do you have any numbness or focal weakness? No    Do you have diabetes? No    Do you have problems with bleeding or clotting? No    Do you have an rashes or other skin lesions? No    Large Joint Injection/Arthocentesis: L subacromial bursa    Date/Time: 12/10/2021 9:33 AM  Performed by: Kleber Salinas DO  Authorized by: Kleber Salinas DO     Indications:  Pain  Needle Size:  22 G  Guidance: ultrasound    Location:  Shoulder      Site:  L subacromial bursa  Medications:  40 mg methylPREDNISolone 40 MG/ML  Outcome:  Tolerated well, no immediate complications  Procedure discussed: discussed risks, benefits, and alternatives    Consent Given by:  Patient  Timeout: timeout called immediately prior to procedure    Prep: patient was prepped and draped in usual sterile fashion       Subacromial Bursa - Ultrasound Guided  The patient was informed of the  risks and the benefits of the procedure and a written consent was signed.  The patient s left shoulder was prepped with chlorhexidine in sterile fashion.   40 mg of methylprednisolone  suspension was drawn up into a 5 mL syringe with 4 mL of 1% lidocaine.  Injection was performed using sterile technique.  Under ultrasound guidance a 1.5-inch 22-gauge needle was used to enter the subacromial bursa.  Anterolateral approach was used with arm held in Crass position.  Needle placement was visualized and documented with ultrasound.  Ultrasound visualization was necessary to ensure placement in to the bursa and not the rotator cuff tendon which could potentially cause further tendon damage.  Injection performed long axis to the probe.  Injection solution visualized within the joint space.  Images were permanently stored for the patient's record.  There were no complications. The patient tolerated the procedure well. There was negligible bleeding.

## 2021-12-10 NOTE — LETTER
12/10/2021         RE: Julianna Dickey  [address] [facility] [address]        Dear Colleague,    Thank you for referring your patient, Julianna Dickey, to the [facility] [place]. Please see a copy of my visit note below.    Julianna has chronic left shoulder pain.  She is here for a left subacromial bursa injection.  Her last injection was in July and did help her for quite a few months.        [facility] FOLLOW-UP VISIT 12/10/2021    Julianna Dickey's chief complaint for this visit includes:  Chief Complaint   Patient presents with     Consult     left shoulder pain, last injection subacromial bursa injection done 7/2021 helped      PCP: Liz Sublette Sonia Dunn    Interval History:     Follow up reason: left shoulder pain     Following Therapeutic Plan: Yes     Pain: Worsening    Function: Worsening    Interval History: last injection in July helped for over 3 months      Medical History:    Any recent changes to your medical history? No    Any new medication prescribed since last visit? No    Review of Systems:    Do you have fever, chills, weight loss? No    Do you have any vision problems? No    Do you have any chest pain or edema? No    Do you have any shortness of breath or wheezing?  No    Do you have stomach problems? No    Do you have any urinary track issues? No    Do you have any numbness or focal weakness? No    Do you have diabetes? No    Do you have problems with bleeding or clotting? No    Do you have an rashes or other skin lesions? No    Large Joint Injection/Arthocentesis: L subacromial bursa    Date/Time: 12/10/2021 9:33 AM  Performed by: Kleber Salinas DO  Authorized by: Kleber Salinas DO     Indications:  Pain  Needle Size:  22 G  Guidance: ultrasound    Location:  Shoulder      Site:  L subacromial bursa  Medications:  40 mg methylPREDNISolone 40 MG/ML  Outcome:  Tolerated well, no immediate complications  Procedure discussed: discussed  risks, benefits, and alternatives    Consent Given by:  Patient  Timeout: timeout called immediately prior to procedure    Prep: patient was prepped and draped in usual sterile fashion       Subacromial Bursa - Ultrasound Guided  The patient was informed of the risks and the benefits of the procedure and a written consent was signed.  The patient s left shoulder was prepped with chlorhexidine in sterile fashion.   40 mg of methylprednisolone  suspension was drawn up into a 5 mL syringe with 4 mL of 1% lidocaine.  Injection was performed using sterile technique.  Under ultrasound guidance a 1.5-inch 22-gauge needle was used to enter the subacromial bursa.  Anterolateral approach was used with arm held in Crass position.  Needle placement was visualized and documented with ultrasound.  Ultrasound visualization was necessary to ensure placement in to the bursa and not the rotator cuff tendon which could potentially cause further tendon damage.  Injection performed long axis to the probe.  Injection solution visualized within the joint space.  Images were permanently stored for the patient's record.  There were no complications. The patient tolerated the procedure well. There was negligible bleeding.                   Again, thank you for allowing me to participate in the care of your patient.        Sincerely,        Kleber Salinas DO

## 2021-12-31 ENCOUNTER — THERAPY VISIT (OUTPATIENT)
Dept: PHYSICAL THERAPY | Facility: CLINIC | Age: 46
End: 2021-12-31
Payer: COMMERCIAL

## 2021-12-31 DIAGNOSIS — M25.512 CHRONIC LEFT SHOULDER PAIN: ICD-10-CM

## 2021-12-31 DIAGNOSIS — G89.29 CHRONIC LEFT SHOULDER PAIN: ICD-10-CM

## 2021-12-31 PROCEDURE — 97110 THERAPEUTIC EXERCISES: CPT | Mod: GP

## 2021-12-31 PROCEDURE — 97161 PT EVAL LOW COMPLEX 20 MIN: CPT | Mod: GP

## 2021-12-31 NOTE — PROGRESS NOTES
Physical Therapy Initial Evaluation  Subjective:  The history is provided by the patient. No  was used.   Therapist Generated HPI Evaluation  Problem details: The patient is trying to avoid surgery. She has a burning sensation that hurts whenever she uses it or raises her arm. She does her exercises every once and awhile. She uses heat and ice. Gets pain when she sleeps on her left. It's primarily in the left RC, but the more she works it the more the pain spreads. She says that shoulder has been bothering her for over a year. She got an injection on 12/10, which wasn't as successful as her previous injections but still helped some. The exercises she did with Desire seemed to help, but she discontinued them after her injection..         Type of problem:  Left shoulder.    This is a chronic condition.  Condition occurred with:  Unknown cause.    Patient reports pain:  Lateral, anterior and posterior.  Pain is described as burning   Pain radiates to:  Upper arm. Pain is worse during the day.  Since onset symptoms are gradually worsening.  Associated symptoms:  Loss of strength and loss of motion/stiffness. Symptoms are exacerbated by using arm behind back, using arm at shoulder level, using arm overhead, carrying, lifting and lying on extremity  and relieved by muscle relaxants, heat, ice and NSAID's.  Special tests included:  MRI.  Previous treatment includes physical therapy. There was moderate improvement following previous treatment.  Restrictions due to condition include:  Working in normal job without restrictions.  Barriers include:  None as reported by patient.    Patient Health History  Julianna Keli being seen for Left Shoulder Pain.     Problem began: 12/31/2019.   Problem occurred: Unknown   Pain is reported as 6/10 on pain scale.  General health as reported by patient is good.                  Current occupation is Works at the hospital.                                        Objective:  System                   Shoulder Evaluation:  ROM:  AROM:    Flexion:  Left:  120      Extension: Left: 35  Abduction:  Left: 120         External Rotation:  Left:  31              Flexion/External Rotation:  Left:  T1      Extension/Internal Rotation:  Left:  L3              Strength:    Flexion: Left:4+/5   Pain:        Abduction:  Left: 3+/5  Pain:          External Rotation:   Left:3+/5     Pain:         Elbow Flexion:  Left:4+/5     Pain:      Elbow Extension:  Left:4+/5     Pain:      Stability Testing:  normal      Special Tests:    Left shoulder positive for the following special tests:  Rotator cuff tear  Left shoulder negative for the following special tests:  Impingement    Palpation:  Palpation assessed shoulder: TTP off the Acromion Shelf on RC insertion.                                         General     ROS    Assessment/Plan:    Patient is a 46 year old female with left side shoulder complaints.    Patient has the following significant findings with corresponding treatment plan.                Diagnosis 1:  Left Shoulder Pain  Pain -  manual therapy and home program  Decreased ROM/flexibility - manual therapy, therapeutic exercise and home program  Decreased strength - therapeutic exercise, therapeutic activities and home program    Therapy Evaluation Codes:   1) History comprised of:   Personal factors that impact the plan of care:      None.    Comorbidity factors that impact the plan of care are:      None.     Medications impacting care: None.  2) Examination of Body Systems comprised of:   Body structures and functions that impact the plan of care:      Shoulder.   Activity limitations that impact the plan of care are:      Lifting and Working.  3) Clinical presentation characteristics are:   Stable/Uncomplicated.  4) Decision-Making    Low complexity using standardized patient assessment instrument and/or measureable assessment of functional outcome.  Cumulative Therapy  Evaluation is: Low complexity.    Previous and current functional limitations:  (See Goal Flow Sheet for this information)    Short term and Long term goals: (See Goal Flow Sheet for this information)     Communication ability:  Patient appears to be able to clearly communicate and understand verbal and written communication and follow directions correctly.  Treatment Explanation - The following has been discussed with the patient:   RX ordered/plan of care  Anticipated outcomes  Possible risks and side effects  This patient would benefit from PT intervention to resume normal activities.   Rehab potential is good.    Frequency:  1 X week, once daily  Duration:  for 8 weeks  Discharge Plan:  Achieve all LTG.  Independent in home treatment program.  Reach maximal therapeutic benefit.    Please refer to the daily flowsheet for treatment today, total treatment time and time spent performing 1:1 timed codes.

## 2021-12-31 NOTE — PROGRESS NOTES
[facility]    OUTPATIENT Physical Therapy ORTHOPEDIC EVALUATION  PLAN OF TREATMENT FOR OUTPATIENT REHABILITATION  (COMPLETE FOR INITIAL CLAIMS ONLY)  Patient's Last Name, First Name, M.I.  YOB: 1975  Julianna Dickey       Provider s Name:  [facility]   Medical Record No.  [id number]   Start of Care Date:  12/31/21   Onset Date:   12/10/21   Type:     _X__PT   ___OT Medical Diagnosis:    Encounter Diagnosis   Name Primary?     Chronic left shoulder pain         Treatment Diagnosis:  Left Shoulder Pain        Goals:     12/31/21 0500   Body Part   Goals listed below are for Left Shoulder   Goal #1   Goal #1 reaching   Previous Functional Level No restrictions   Performance level No restrictions prior to injury   Current Functional Level Can reach ;to shoulder level   Performance level 6/10 pain reaching to shoulder level   STG Target Performance Reach ;to shoulder level   Performance level Patient will be able to reach to shoulder level without pain greater than 4/10   Rationale for job requirements in their work place   Due date 01/19/22   LTG Target Performance Repetitive or prolonged use of arm overhead for   Performance Level Patient will be able to raise their arm overhead repetitively without pain greater than 3/10   Rationale for job requirements in their work place   Due date 02/18/22       Therapy Frequency:  1x a week  Predicted Duration of Therapy Intervention:  8 weeks    Adriel Gardner, PT                 I CERTIFY THE NEED FOR THESE SERVICES FURNISHED UNDER        THIS PLAN OF TREATMENT AND WHILE UNDER MY CARE     (Physician co-signature of this document indicates review and certification of the therapy plan).                       Certification Date From:  12/31/21   Certification Date To:  02/18/21    Referring Provider:  Kleber Salinas    Initial  Assessment        See Epic Evaluation SOC Date: 12/31/21

## 2022-01-12 ENCOUNTER — THERAPY VISIT (OUTPATIENT)
Dept: PHYSICAL THERAPY | Facility: CLINIC | Age: 47
End: 2022-01-12
Payer: COMMERCIAL

## 2022-01-12 DIAGNOSIS — G89.29 CHRONIC LEFT SHOULDER PAIN: ICD-10-CM

## 2022-01-12 DIAGNOSIS — M25.512 CHRONIC LEFT SHOULDER PAIN: ICD-10-CM

## 2022-01-12 PROCEDURE — 97110 THERAPEUTIC EXERCISES: CPT | Mod: GP

## 2022-01-12 PROCEDURE — 97140 MANUAL THERAPY 1/> REGIONS: CPT | Mod: GP

## 2022-01-18 ENCOUNTER — OFFICE VISIT (OUTPATIENT)
Dept: FAMILY MEDICINE | Facility: CLINIC | Age: 47
End: 2022-01-18
Payer: COMMERCIAL

## 2022-01-18 VITALS
HEART RATE: 81 BPM | OXYGEN SATURATION: 100 % | BODY MASS INDEX: 28.86 KG/M2 | RESPIRATION RATE: 18 BRPM | WEIGHT: 173.2 LBS | SYSTOLIC BLOOD PRESSURE: 131 MMHG | HEIGHT: 65 IN | TEMPERATURE: 98.6 F | DIASTOLIC BLOOD PRESSURE: 80 MMHG

## 2022-01-18 DIAGNOSIS — E55.9 VITAMIN D DEFICIENCY: ICD-10-CM

## 2022-01-18 DIAGNOSIS — R10.13 EPIGASTRIC PAIN: Primary | ICD-10-CM

## 2022-01-18 DIAGNOSIS — K21.00 GASTROESOPHAGEAL REFLUX DISEASE WITH ESOPHAGITIS WITHOUT HEMORRHAGE: ICD-10-CM

## 2022-01-18 DIAGNOSIS — R12 HEARTBURN: ICD-10-CM

## 2022-01-18 PROCEDURE — 99213 OFFICE O/P EST LOW 20 MIN: CPT | Performed by: FAMILY MEDICINE

## 2022-01-18 RX ORDER — FAMOTIDINE 20 MG/1
20 TABLET, FILM COATED ORAL 2 TIMES DAILY
Qty: 60 TABLET | Refills: 2 | Status: SHIPPED | OUTPATIENT
Start: 2022-01-18 | End: 2022-04-19

## 2022-01-18 ASSESSMENT — MIFFLIN-ST. JEOR: SCORE: 1433.38

## 2022-01-18 NOTE — PROGRESS NOTES
"  Assessment & Plan     Epigastric pain   Differentials: GERD, H Pylori, GB disease.  - Helicobacter pylori Antigen Stool; Future  - US Abdomen Limited; Future  - Helicobacter pylori Antigen Stool    Heartburn   H Pylori  - Helicobacter pylori Antigen Stool; Future  - US Abdomen Limited; Future  - Helicobacter pylori Antigen Stool    Gastroesophageal reflux disease with esophagitis without hemorrhage   Will evaluate gall bladder, increase famotidine to twice a day and avoid offending foods  - US Abdomen Limited; Future  - famotidine (PEPCID) 20 MG tablet; Take 1 tablet (20 mg) by mouth 2 times daily    Vitamin D deficiency   supplementation  - vitamin D3 (CHOLECALCIFEROL) 1.25 MG (85806 UT) capsule; Take 1 capsule (50,000 Units) by mouth every 7 days  956}  BMI:   Estimated body mass index is 29 kg/m  as calculated from the following:    Height as of this encounter: 1.646 m (5' 4.8\").    Weight as of this encounter: 78.6 kg (173 lb 3.2 oz).   Weight management plan: Discussed healthy diet and exercise guidelines    Benjie Mireles MD  [facility] KOBE Levine is a 46 year old who presents for the following health issues   HPI     GERD/Heartburn   Too much heart burn, pain radiates up the chest x 1 month,   Been taking Famotidine 20 mg daily for a few months now.  Aggravating factors: some foods, spicy foods, cheese.    Onset/Duration: Patient states it has been going on for awhile maybe a year or two   Intensity: severe lots of burning   Progression of Symptoms: worsening and same  Accompanying Signs & Symptoms:  Does it feel like food gets stuck or trouble swallowing: YES- feels like the food is not going down lots of burning   Nausea: no  Vomiting (bloody?): no  Abdominal Pain: no  Black-Tarry stools: no  Bloody stools: no  History:  Previous similar episodes: YES  Previous ulcers: no  Precipitating factors:   Caffeine use: no  Alcohol use: no  NSAID/Aspirin use: no  Tobacco use: " "no  Worse with fatty foods and spicy foods.  Therapies tried and outcome:             Lifestyle changes: None            Medications: Pepcid (famotidine)    Review of Systems   Constitutional, HEENT, cardiovascular, pulmonary and gu systems are negative, except as otherwise noted.      Objective    /80 (BP Location: Right arm, Cuff Size: Adult Regular)   Pulse 81   Temp 98.6  F (37  C) (Oral)   Resp 18   Ht 1.646 m (5' 4.8\")   Wt 78.6 kg (173 lb 3.2 oz)   LMP 01/09/2022   SpO2 100%   BMI 29.00 kg/m    Body mass index is 29 kg/m .  Physical Exam   GENERAL: healthy, alert and no distress  RESP: lungs clear to auscultation - no rales, rhonchi or wheezes  CV: regular rate and rhythm, normal S1 S2, no S3 or S4, no murmur, click or rub  ABDOMEN: soft, vague epigastric tenderness,  no masses and bowel sounds normal    "

## 2022-01-20 ENCOUNTER — ANCILLARY PROCEDURE (OUTPATIENT)
Dept: ULTRASOUND IMAGING | Facility: CLINIC | Age: 47
End: 2022-01-20
Attending: FAMILY MEDICINE
Payer: COMMERCIAL

## 2022-01-20 DIAGNOSIS — R10.13 EPIGASTRIC PAIN: ICD-10-CM

## 2022-01-20 DIAGNOSIS — K21.00 GASTROESOPHAGEAL REFLUX DISEASE WITH ESOPHAGITIS WITHOUT HEMORRHAGE: ICD-10-CM

## 2022-01-20 DIAGNOSIS — R12 HEARTBURN: ICD-10-CM

## 2022-01-20 PROCEDURE — 76705 ECHO EXAM OF ABDOMEN: CPT | Performed by: RADIOLOGY

## 2022-01-27 ENCOUNTER — THERAPY VISIT (OUTPATIENT)
Dept: PHYSICAL THERAPY | Facility: CLINIC | Age: 47
End: 2022-01-27
Payer: COMMERCIAL

## 2022-01-27 DIAGNOSIS — G89.29 CHRONIC LEFT SHOULDER PAIN: ICD-10-CM

## 2022-01-27 DIAGNOSIS — M25.512 CHRONIC LEFT SHOULDER PAIN: ICD-10-CM

## 2022-01-27 PROCEDURE — 97110 THERAPEUTIC EXERCISES: CPT | Mod: GP

## 2022-02-03 ENCOUNTER — THERAPY VISIT (OUTPATIENT)
Dept: PHYSICAL THERAPY | Facility: CLINIC | Age: 47
End: 2022-02-03
Payer: COMMERCIAL

## 2022-02-03 DIAGNOSIS — M25.512 CHRONIC LEFT SHOULDER PAIN: ICD-10-CM

## 2022-02-03 DIAGNOSIS — G89.29 CHRONIC LEFT SHOULDER PAIN: ICD-10-CM

## 2022-02-03 PROCEDURE — 97110 THERAPEUTIC EXERCISES: CPT | Mod: GP

## 2022-02-22 ENCOUNTER — OFFICE VISIT (OUTPATIENT)
Dept: FAMILY MEDICINE | Facility: CLINIC | Age: 47
End: 2022-02-22
Payer: COMMERCIAL

## 2022-02-22 VITALS
WEIGHT: 168 LBS | BODY MASS INDEX: 28.68 KG/M2 | RESPIRATION RATE: 18 BRPM | HEIGHT: 64 IN | DIASTOLIC BLOOD PRESSURE: 82 MMHG | TEMPERATURE: 96.6 F | SYSTOLIC BLOOD PRESSURE: 136 MMHG | HEART RATE: 98 BPM | OXYGEN SATURATION: 96 %

## 2022-02-22 DIAGNOSIS — K21.00 GASTROESOPHAGEAL REFLUX DISEASE WITH ESOPHAGITIS WITHOUT HEMORRHAGE: Primary | ICD-10-CM

## 2022-02-22 DIAGNOSIS — R10.13 DYSPEPSIA: ICD-10-CM

## 2022-02-22 PROCEDURE — 99214 OFFICE O/P EST MOD 30 MIN: CPT | Performed by: PHYSICIAN ASSISTANT

## 2022-02-22 ASSESSMENT — PAIN SCALES - GENERAL: PAINLEVEL: NO PAIN (0)

## 2022-02-22 NOTE — PROGRESS NOTES
"  Assessment & Plan       ICD-10-CM    1. Gastroesophageal reflux disease with esophagitis without hemorrhage  K21.00 Adult Gastro Ref - Procedure Only   2. Dyspepsia  R10.13 omeprazole (PRILOSEC) 20 MG DR capsule   Talk to patient about her concerns we continue to talk about dietary changes.  We will go ahead and add omeprazole 20 mg twice a day to the famotidine.  We will go ahead and get endoscopy scheduled due to her uncontrolled symptoms and duration of her symptoms.  Follow-up with depend on those results.  Warning signs were discussed.    Return in about 4 weeks (around 3/22/2022) for recheck, As Needed.    ZURI Delgadillo [facility] KENZIE Levine is a 46 year old who presents for the following health issues  accompanied by her self.    History of Present Illness     Reason for visit:  Gerd  Symptom onset:  More than a month  Symptoms include:  Heartburns  Symptom intensity:  Severe  Symptom progression:  Staying the same  Had these symptoms before:  Yes  Has tried/received treatment for these symptoms:  No  What makes it worse:  Certain foods  What makes it better:  Fruits    She eats 2-3 servings of fruits and vegetables daily.She consumes 1 sweetened beverage(s) daily.She exercises with enough effort to increase her heart rate 9 or less minutes per day.  She exercises with enough effort to increase her heart rate 3 or less days per week. She is missing 1 dose(s) of medications per week.       Need Referral GERD?    Going on for years.   Worse Spicy foods, her generilized cultural foods.     Tx: famotidine twice a day helps little.   Overall burning, belching that isn't better. Epigastric pain at times.       Review of Systems   Constitutional, HEENT, cardiovascular, pulmonary, gi and gu systems are negative, except as otherwise noted.      Objective    /82   Pulse 98   Temp (!) 96.6  F (35.9  C) (Tympanic)   Resp 18   Ht 1.626 m (5' 4\")   Wt 76.2 kg (168 " lb)   LMP 02/08/2022   SpO2 96%   BMI 28.84 kg/m    Body mass index is 28.84 kg/m .  Physical Exam   GENERAL: healthy, alert and no distress  EYES: Eyes grossly normal to inspection, PERRL and conjunctivae and sclerae normal  HENT: ear canals and TM's normal, nose and mouth without ulcers or lesions  NECK: no adenopathy, no asymmetry, masses, or scars and thyroid normal to palpation  RESP: lungs clear to auscultation - no rales, rhonchi or wheezes  CV: regular rate and rhythm, normal S1 S2, no S3 or S4, no murmur, click or rub, no peripheral edema and peripheral pulses strong  ABDOMEN: soft, with epigastric tenderness, without hepatosplenomegaly or masses and bowel sounds normal  MS: no gross musculoskeletal defects noted, no edema  SKIN: no suspicious lesions or rashes  NEURO: Normal strength and tone, mentation intact and speech normal  PSYCH: mentation appears normal, affect normal/bright    Labs reviewed.

## 2022-02-23 ENCOUNTER — TELEPHONE (OUTPATIENT)
Dept: GASTROENTEROLOGY | Facility: CLINIC | Age: 47
End: 2022-02-23

## 2022-02-23 DIAGNOSIS — Z11.59 ENCOUNTER FOR SCREENING FOR OTHER VIRAL DISEASES: Primary | ICD-10-CM

## 2022-02-23 NOTE — TELEPHONE ENCOUNTER
Screening Questions  Blue=prep questions Red=location Green=sedation   1. Are you active on mychart? Y    What insurance is in the chart?  ARE PMAP  2.     3.  Ordering/Referring Provider: Eduardo Montano PA-C    4. BMI 28.84, If greater than 40 review exclusion criteria also will need EXTENDED PREP    5.  Respiratory Screening (If yes to any of the following HOSPITAL setting only):     Do you use daily home oxygen? N  Do you have mod to severe Obstructive Sleep Apnea? N (can be seen at [facility] or hospital setting)    Do you have Pulmonary Hypertension? N   Do you have UNCONTROLLED asthma? N    6. Have you had a heart or lung transplant? N  (If yes, please review exclusion criteria)    7. Are you currently on dialysis?N  (If yes, schedule in HOSPITAL setting only)(If yes, please send Golytely prep)    8. Do you have chronic kidney disease? N (If yes, please send Golytely prep)    9. Have you had a stroke or Transient ischemic attack (TIA) within 6 months? N (If yes, do not schedule at [facility])    10. In the past 6 months, have you had any heart related issues including cardiomyopathy or heart attack? N (If yes, please review exclusion criteria)           If yes, did it require cardiac stenting or other implantable device?N  (If yes, please review exclusion criteria)      11. Do you have any implantable devices in your body (pacemaker, defib, LVAD)? N (If yes, schedule at [facility])    12. Do you take nitroglycerin? If yes, how often? N (if yes, schedule at HOSPITAL setting)    13. Are you currently taking any blood thinners?N (If yes- inform patient to follow up with PCP or provider for follow up instructions)     14. Are you a diabetic? N (If yes, please send Golytely prep)    15. (Females) Are you currently pregnant? N  If yes, how many weeks?      16. Are you taking any prescription pain medications on a routine schedule? N If yes, MAC sedation and patient will need EXTENDED PREP.    17. Do you have any chemical  dependencies such as alcohol, street drugs, or methadone? N If yes, MAC sedation     18. Do you have any history of post-traumatic stress syndrome, severe anxiety or history of psychosis? N  If yes, MAC sedation.     19. Do you transfer independently? Y    20.  Do you have any issues with constipation? N   If yes, pt will need EXTENDED PREP     21. Preferred Pharmacy for Pre Prescription [facility]    Scheduling Details    Which Colonoscopy Prep was Sent?: NONE  Type of Procedure Scheduled: EGD  Surgeon: GILLES  Date of Procedure: 3/10  Location:    Caller (Please ask for phone number if not scheduled by patient): SELF      Sedation Type: CONSCIOUS  Conscious Sedation- Needs  for 6 hours after the procedure  MAC/General-Needs  for 24 hours after procedure    Pre-op Required at [facility] [place], [place] and OR for MAC sedation: N  (if yes advise patient they will need a pre-op prior to procedure)      Informed patient they will need an adult  Y  Cannot take any type of public or medical transportation alone    Pre-Pro[facility]    Confirmed Nurse will call to complete assessment Y    Additional comments: N ([facility] PATIENTS NEED EXTENDED PREP)

## 2022-03-07 ENCOUNTER — LAB (OUTPATIENT)
Dept: URGENT CARE | Facility: URGENT CARE | Age: 47
End: 2022-03-07
Payer: COMMERCIAL

## 2022-03-07 DIAGNOSIS — Z11.59 ENCOUNTER FOR SCREENING FOR OTHER VIRAL DISEASES: ICD-10-CM

## 2022-03-07 PROBLEM — G89.29 CHRONIC LEFT SHOULDER PAIN: Status: RESOLVED | Noted: 2021-12-31 | Resolved: 2022-03-07

## 2022-03-07 PROBLEM — M25.512 CHRONIC LEFT SHOULDER PAIN: Status: RESOLVED | Noted: 2021-12-31 | Resolved: 2022-03-07

## 2022-03-07 PROCEDURE — U0003 INFECTIOUS AGENT DETECTION BY NUCLEIC ACID (DNA OR RNA); SEVERE ACUTE RESPIRATORY SYNDROME CORONAVIRUS 2 (SARS-COV-2) (CORONAVIRUS DISEASE [COVID-19]), AMPLIFIED PROBE TECHNIQUE, MAKING USE OF HIGH THROUGHPUT TECHNOLOGIES AS DESCRIBED BY CMS-2020-01-R: HCPCS

## 2022-03-07 PROCEDURE — U0005 INFEC AGEN DETEC AMPLI PROBE: HCPCS

## 2022-03-07 NOTE — PROGRESS NOTES
DISCHARGE SUMMARY    Julianna Dickey was seen 4 times for evaluation and treatment.  Patient did not return for further treatment and current status is unknown.  Due to short treatment duration, no objective or functional changes were made.  Please see goal flow sheet from episode noted date below and initial evaluation for further information.  Patient is discharged from therapy and therapy episode is resolved as of 03/07/22.      Linked Episodes   Type: Episode: Status: Noted: Resolved: Last update: Updated by:   PHYSICAL THERAPY Chronic Left Shoulder Pain Active 12/31/2021  2/3/2022  4:49 PM Adriel Gardner, PT      Comments:

## 2022-03-08 LAB — SARS-COV-2 RNA RESP QL NAA+PROBE: NEGATIVE

## 2022-03-10 ENCOUNTER — HOSPITAL ENCOUNTER (OUTPATIENT)
Facility: AMBULATORY SURGERY CENTER | Age: 47
Discharge: HOME | End: 2022-03-10
Attending: INTERNAL MEDICINE | Admitting: INTERNAL MEDICINE
Payer: COMMERCIAL

## 2022-03-10 VITALS
OXYGEN SATURATION: 100 % | HEART RATE: 75 BPM | RESPIRATION RATE: 16 BRPM | SYSTOLIC BLOOD PRESSURE: 127 MMHG | DIASTOLIC BLOOD PRESSURE: 83 MMHG | TEMPERATURE: 97.2 F

## 2022-03-10 LAB — UPPER GI ENDOSCOPY: NORMAL

## 2022-03-10 PROCEDURE — 43239 EGD BIOPSY SINGLE/MULTIPLE: CPT | Mod: XS

## 2022-03-10 PROCEDURE — 43251 EGD REMOVE LESION SNARE: CPT

## 2022-03-10 PROCEDURE — G8918 PT W/O PREOP ORDER IV AB PRO: HCPCS

## 2022-03-10 PROCEDURE — 88305 TISSUE EXAM BY PATHOLOGIST: CPT | Performed by: PATHOLOGY

## 2022-03-10 PROCEDURE — G8907 PT DOC NO EVENTS ON DISCHARG: HCPCS

## 2022-03-10 RX ORDER — ONDANSETRON 2 MG/ML
4 INJECTION INTRAMUSCULAR; INTRAVENOUS
Status: DISCONTINUED | OUTPATIENT
Start: 2022-03-10 | End: 2022-03-11 | Stop reason: HOSPADM

## 2022-03-10 RX ORDER — FLUMAZENIL 0.1 MG/ML
0.2 INJECTION, SOLUTION INTRAVENOUS
Status: ACTIVE | OUTPATIENT
Start: 2022-03-10 | End: 2022-03-10

## 2022-03-10 RX ORDER — NALOXONE HYDROCHLORIDE 0.4 MG/ML
0.4 INJECTION, SOLUTION INTRAMUSCULAR; INTRAVENOUS; SUBCUTANEOUS
Status: DISCONTINUED | OUTPATIENT
Start: 2022-03-10 | End: 2022-03-11 | Stop reason: HOSPADM

## 2022-03-10 RX ORDER — FENTANYL CITRATE 50 UG/ML
INJECTION, SOLUTION INTRAMUSCULAR; INTRAVENOUS PRN
Status: DISCONTINUED | OUTPATIENT
Start: 2022-03-10 | End: 2022-03-10 | Stop reason: HOSPADM

## 2022-03-10 RX ORDER — NALOXONE HYDROCHLORIDE 0.4 MG/ML
0.2 INJECTION, SOLUTION INTRAMUSCULAR; INTRAVENOUS; SUBCUTANEOUS
Status: DISCONTINUED | OUTPATIENT
Start: 2022-03-10 | End: 2022-03-11 | Stop reason: HOSPADM

## 2022-03-10 RX ORDER — LIDOCAINE 40 MG/G
CREAM TOPICAL
Status: DISCONTINUED | OUTPATIENT
Start: 2022-03-10 | End: 2022-03-11 | Stop reason: HOSPADM

## 2022-03-10 RX ORDER — PROCHLORPERAZINE MALEATE 10 MG
10 TABLET ORAL EVERY 6 HOURS PRN
Status: DISCONTINUED | OUTPATIENT
Start: 2022-03-10 | End: 2022-03-11 | Stop reason: HOSPADM

## 2022-03-10 RX ORDER — ONDANSETRON 2 MG/ML
4 INJECTION INTRAMUSCULAR; INTRAVENOUS EVERY 6 HOURS PRN
Status: DISCONTINUED | OUTPATIENT
Start: 2022-03-10 | End: 2022-03-11 | Stop reason: HOSPADM

## 2022-03-10 RX ORDER — ONDANSETRON 4 MG/1
4 TABLET, ORALLY DISINTEGRATING ORAL EVERY 6 HOURS PRN
Status: DISCONTINUED | OUTPATIENT
Start: 2022-03-10 | End: 2022-03-11 | Stop reason: HOSPADM

## 2022-03-13 ENCOUNTER — HEALTH MAINTENANCE LETTER (OUTPATIENT)
Age: 47
End: 2022-03-13

## 2022-03-14 LAB
PATH REPORT.COMMENTS IMP SPEC: NORMAL
PATH REPORT.COMMENTS IMP SPEC: NORMAL
PATH REPORT.FINAL DX SPEC: NORMAL
PATH REPORT.GROSS SPEC: NORMAL
PATH REPORT.MICROSCOPIC SPEC OTHER STN: NORMAL
PATH REPORT.RELEVANT HX SPEC: NORMAL
PHOTO IMAGE: NORMAL

## 2022-08-16 DIAGNOSIS — R10.13 DYSPEPSIA: ICD-10-CM

## 2022-08-17 NOTE — TELEPHONE ENCOUNTER
Prescription approved per [facility] [facility].    Dalila Beaulieu, RN, BSN

## 2022-08-25 NOTE — Clinical Note
Please fax pended letter to Yuliet at [phone] and notify patient. I changed the pushing/pullling cart limit slightly to 35#. 
Home

## 2023-01-14 ENCOUNTER — HEALTH MAINTENANCE LETTER (OUTPATIENT)
Age: 48
End: 2023-01-14

## 2023-01-27 ENCOUNTER — OFFICE VISIT (OUTPATIENT)
Dept: FAMILY MEDICINE | Facility: CLINIC | Age: 48
End: 2023-01-27
Payer: COMMERCIAL

## 2023-01-27 VITALS
WEIGHT: 188 LBS | BODY MASS INDEX: 32.1 KG/M2 | SYSTOLIC BLOOD PRESSURE: 134 MMHG | OXYGEN SATURATION: 100 % | DIASTOLIC BLOOD PRESSURE: 70 MMHG | HEART RATE: 94 BPM | HEIGHT: 64 IN | RESPIRATION RATE: 16 BRPM | TEMPERATURE: 98.1 F

## 2023-01-27 DIAGNOSIS — Z12.31 ENCOUNTER FOR SCREENING MAMMOGRAM FOR BREAST CANCER: ICD-10-CM

## 2023-01-27 DIAGNOSIS — N92.4 EXCESSIVE BLEEDING IN PREMENOPAUSAL PERIOD: ICD-10-CM

## 2023-01-27 DIAGNOSIS — D57.3 SICKLE CELL TRAIT (H): ICD-10-CM

## 2023-01-27 DIAGNOSIS — Z00.00 ROUTINE GENERAL MEDICAL EXAMINATION AT A HEALTH CARE FACILITY: Primary | ICD-10-CM

## 2023-01-27 DIAGNOSIS — R10.2 CHRONIC PELVIC PAIN IN FEMALE: ICD-10-CM

## 2023-01-27 DIAGNOSIS — G89.29 CHRONIC PELVIC PAIN IN FEMALE: ICD-10-CM

## 2023-01-27 DIAGNOSIS — Z12.11 COLON CANCER SCREENING: ICD-10-CM

## 2023-01-27 DIAGNOSIS — E55.9 VITAMIN D DEFICIENCY: ICD-10-CM

## 2023-01-27 DIAGNOSIS — D50.0 IRON DEFICIENCY ANEMIA DUE TO CHRONIC BLOOD LOSS: ICD-10-CM

## 2023-01-27 DIAGNOSIS — Z11.3 SCREEN FOR STD (SEXUALLY TRANSMITTED DISEASE): ICD-10-CM

## 2023-01-27 LAB
ALBUMIN SERPL-MCNC: 4 G/DL (ref 3.4–5)
ALP SERPL-CCNC: 64 U/L (ref 40–150)
ALT SERPL W P-5'-P-CCNC: 27 U/L (ref 0–50)
ANION GAP SERPL CALCULATED.3IONS-SCNC: 3 MMOL/L (ref 3–14)
AST SERPL W P-5'-P-CCNC: 15 U/L (ref 0–45)
BASOPHILS # BLD AUTO: 0.1 10E3/UL (ref 0–0.2)
BASOPHILS NFR BLD AUTO: 1 %
BILIRUB SERPL-MCNC: 0.4 MG/DL (ref 0.2–1.3)
BUN SERPL-MCNC: 13 MG/DL (ref 7–30)
CALCIUM SERPL-MCNC: 9.4 MG/DL (ref 8.5–10.1)
CHLORIDE BLD-SCNC: 103 MMOL/L (ref 94–109)
CHOLEST SERPL-MCNC: 159 MG/DL
CO2 SERPL-SCNC: 30 MMOL/L (ref 20–32)
CREAT SERPL-MCNC: 1.07 MG/DL (ref 0.52–1.04)
EOSINOPHIL # BLD AUTO: 0.2 10E3/UL (ref 0–0.7)
EOSINOPHIL NFR BLD AUTO: 4 %
ERYTHROCYTE [DISTWIDTH] IN BLOOD BY AUTOMATED COUNT: 19.8 % (ref 10–15)
FASTING STATUS PATIENT QL REPORTED: NO
FERRITIN SERPL-MCNC: 4 NG/ML (ref 8–252)
GFR SERPL CREATININE-BSD FRML MDRD: 65 ML/MIN/1.73M2
GLUCOSE BLD-MCNC: 101 MG/DL (ref 70–99)
HBA1C MFR BLD: 5.8 % (ref 0–5.6)
HCT VFR BLD AUTO: 30.5 % (ref 35–47)
HDLC SERPL-MCNC: 56 MG/DL
HGB BLD-MCNC: 9.6 G/DL (ref 11.7–15.7)
LDLC SERPL CALC-MCNC: 71 MG/DL
LYMPHOCYTES # BLD AUTO: 2.4 10E3/UL (ref 0.8–5.3)
LYMPHOCYTES NFR BLD AUTO: 43 %
MCH RBC QN AUTO: 17.6 PG (ref 26.5–33)
MCHC RBC AUTO-ENTMCNC: 31.5 G/DL (ref 31.5–36.5)
MCV RBC AUTO: 56 FL (ref 78–100)
MONOCYTES # BLD AUTO: 0.7 10E3/UL (ref 0–1.3)
MONOCYTES NFR BLD AUTO: 13 %
NEUTROPHILS # BLD AUTO: 2.3 10E3/UL (ref 1.6–8.3)
NEUTROPHILS NFR BLD AUTO: 40 %
NONHDLC SERPL-MCNC: 103 MG/DL
PLATELET # BLD AUTO: 422 10E3/UL (ref 150–450)
POTASSIUM BLD-SCNC: 4.1 MMOL/L (ref 3.4–5.3)
PROT SERPL-MCNC: 8.1 G/DL (ref 6.8–8.8)
RBC # BLD AUTO: 5.45 10E6/UL (ref 3.8–5.2)
SODIUM SERPL-SCNC: 136 MMOL/L (ref 133–144)
TRIGL SERPL-MCNC: 158 MG/DL
WBC # BLD AUTO: 5.7 10E3/UL (ref 4–11)

## 2023-01-27 PROCEDURE — 87591 N.GONORRHOEAE DNA AMP PROB: CPT | Performed by: FAMILY MEDICINE

## 2023-01-27 PROCEDURE — 80061 LIPID PANEL: CPT | Performed by: FAMILY MEDICINE

## 2023-01-27 PROCEDURE — 87491 CHLMYD TRACH DNA AMP PROBE: CPT | Performed by: FAMILY MEDICINE

## 2023-01-27 PROCEDURE — 90677 PCV20 VACCINE IM: CPT | Performed by: FAMILY MEDICINE

## 2023-01-27 PROCEDURE — 82306 VITAMIN D 25 HYDROXY: CPT | Performed by: FAMILY MEDICINE

## 2023-01-27 PROCEDURE — 80053 COMPREHEN METABOLIC PANEL: CPT | Performed by: FAMILY MEDICINE

## 2023-01-27 PROCEDURE — 0124A COVID-19 VACCINE BIVALENT BOOSTER 12+ (PFIZER): CPT | Performed by: FAMILY MEDICINE

## 2023-01-27 PROCEDURE — 85025 COMPLETE CBC W/AUTO DIFF WBC: CPT | Performed by: FAMILY MEDICINE

## 2023-01-27 PROCEDURE — 90472 IMMUNIZATION ADMIN EACH ADD: CPT | Performed by: FAMILY MEDICINE

## 2023-01-27 PROCEDURE — 91312 COVID-19 VACCINE BIVALENT BOOSTER 12+ (PFIZER): CPT | Performed by: FAMILY MEDICINE

## 2023-01-27 PROCEDURE — 86780 TREPONEMA PALLIDUM: CPT | Performed by: FAMILY MEDICINE

## 2023-01-27 PROCEDURE — 90715 TDAP VACCINE 7 YRS/> IM: CPT | Performed by: FAMILY MEDICINE

## 2023-01-27 PROCEDURE — 83036 HEMOGLOBIN GLYCOSYLATED A1C: CPT | Performed by: FAMILY MEDICINE

## 2023-01-27 PROCEDURE — 99214 OFFICE O/P EST MOD 30 MIN: CPT | Mod: 25 | Performed by: FAMILY MEDICINE

## 2023-01-27 PROCEDURE — 87389 HIV-1 AG W/HIV-1&-2 AB AG IA: CPT | Performed by: FAMILY MEDICINE

## 2023-01-27 PROCEDURE — 82728 ASSAY OF FERRITIN: CPT | Performed by: FAMILY MEDICINE

## 2023-01-27 PROCEDURE — 36415 COLL VENOUS BLD VENIPUNCTURE: CPT | Performed by: FAMILY MEDICINE

## 2023-01-27 PROCEDURE — 99396 PREV VISIT EST AGE 40-64: CPT | Mod: 25 | Performed by: FAMILY MEDICINE

## 2023-01-27 PROCEDURE — 90471 IMMUNIZATION ADMIN: CPT | Performed by: FAMILY MEDICINE

## 2023-01-27 RX ORDER — FERROUS SULFATE 325(65) MG
325 TABLET ORAL
Qty: 270 TABLET | Refills: 1 | Status: SHIPPED | OUTPATIENT
Start: 2023-01-27 | End: 2024-01-05

## 2023-01-27 ASSESSMENT — ENCOUNTER SYMPTOMS
NERVOUS/ANXIOUS: 0
HEADACHES: 1
MYALGIAS: 1
JOINT SWELLING: 0
WEAKNESS: 0
FEVER: 0
FREQUENCY: 0
CONSTIPATION: 0
SHORTNESS OF BREATH: 0
PARESTHESIAS: 0
ABDOMINAL PAIN: 0
HEMATURIA: 0
HEARTBURN: 1
DYSURIA: 0
BREAST MASS: 0
NAUSEA: 0
DIZZINESS: 1
HEMATOCHEZIA: 0
EYE PAIN: 0
PALPITATIONS: 0
SORE THROAT: 0
DIARRHEA: 0
ARTHRALGIAS: 0
COUGH: 0
CHILLS: 0

## 2023-01-27 ASSESSMENT — PAIN SCALES - GENERAL: PAINLEVEL: NO PAIN (0)

## 2023-01-27 NOTE — PROGRESS NOTES
SUBJECTIVE:   CC: Julianna is an 47 year old who presents for preventive health visit.   Patient has been advised of split billing requirements and indicates understanding: Yes  Healthy Habits:     Getting at least 3 servings of Calcium per day:  NO    Bi-annual eye exam:  Yes    Dental care twice a year:  Yes    Sleep apnea or symptoms of sleep apnea:  None    Diet:  Regular (no restrictions)    Frequency of exercise:  1 day/week    Duration of exercise:  Less than 15 minutes    Taking medications regularly:  Yes    Medication side effects:  None    PHQ-2 Total Score: 0    Additional concerns today:  Yes      Preventive -     Immunization History   Administered Date(s) Administered     COVID-19 Vaccine 12+ (Pfizer) 04/23/2021, 05/14/2021, 02/25/2022     HepB-Adult 10/27/2010     Influenza (IIV3) PF 10/27/2010, 01/24/2013     Influenza Vaccine >6 months (Alfuria,Fluzone) 10/30/2014, 09/26/2017, 09/30/2019     Influenza, Intradermal, Quad, Pf 10/20/2015     TDAP Vaccine (Adacel) 05/26/2009       -Mammogram:   07/02/2021  Findings: The breasts are heterogeneously dense, which may obscure small masses. There is no radiographic evidence of malignancy. This study was evaluated with the assistance of Computer-Aided Detection.  Breast Tomosynthesis was used in interpretation.     ACR BI-RADS Category 1: Negative    -Contraception: no     -PAP:   Had PAP 10/03/2019 at [facility]  HPV negative   Pap Smear Report      Collection Date:          10/3/2019 11:29 CDT      Ordering Physician:       COUNCILMAN, ROBIN  Received Date:            10/3/2019 11:37 CDT      Accession Number:         [id number]                                                 Pap Test Report  Final Diagnosis:  NEGATIVE FOR INTRAEPITHELIAL LESION OR MALIGNANCY    High-risk HPV co-testing will be performed and separately reported by the [facility].    No results found for: PAP  Next PAP 10/2024     - Colon CA screen: Colonoscopy,  age 45-75 every 10 years or FIT every year or Cologuard every 3 years     Referred   -lipids screen:ordered     Diabetes screen: ordered         Today's PHQ-2 Score:   PHQ-2 ( 1999 Pfizer) 1/27/2023   Q1: Little interest or pleasure in doing things 0   Q2: Feeling down, depressed or hopeless 0   PHQ-2 Score 0   PHQ-2 Total Score (12-17 Years)- Positive if 3 or more points; Administer PHQ-A if positive -   Q1: Little interest or pleasure in doing things Not at all   Q2: Feeling down, depressed or hopeless Not at all   PHQ-2 Score 0     SH:    Marital status: single   Kids: 2  Employment: [employer]   Exercise: yes walking   Tobacco: no   Etoh: occasionally   Recreational drugs: no   Caffeine: no   [place] 9 ,  Have you ever done Advance Care Planning? (For example, a Health Directive, POLST, or a discussion with a medical provider or your loved ones about your wishes): No, advance care planning information given to patient to review.  Patient declined advance care planning discussion at this time.    Social History     Tobacco Use     Smoking status: Never     Smokeless tobacco: Never   Substance Use Topics     Alcohol use: Never     Comment: social, occasionally     If you drink alcohol do you typically have >3 drinks per day or >7 drinks per week? No    Alcohol Use 1/27/2023   Prescreen: >3 drinks/day or >7 drinks/week? No   Prescreen: >3 drinks/day or >7 drinks/week? -   No flowsheet data found.    Reviewed orders with patient.  Reviewed health maintenance and updated orders accordingly - Yes  Lab work is in process  Labs reviewed in EPIC  BP Readings from Last 3 Encounters:   01/27/23 134/70   03/10/22 127/83   02/22/22 136/82    Wt Readings from Last 3 Encounters:   01/27/23 85.3 kg (188 lb)   02/22/22 76.2 kg (168 lb)   01/18/22 78.6 kg (173 lb 3.2 oz)                  Patient Active Problem List   Diagnosis     Sickle cell trait (H)     Chronic pelvic pain in  female     Dyspepsia     Gastroesophageal reflux disease with esophagitis     Obesity (BMI 30.0-34.9)     Keloid scar     Past Surgical History:   Procedure Laterality Date     COMBINED ESOPHAGOSCOPY, GASTROSCOPY, DUODENOSCOPY (EGD) WITH CO2 INSUFFLATION N/A 3/10/2022    Procedure: ESOPHAGOGASTRODUODENOSCOPY, WITH CO2 INSUFFLATION;  Surgeon: Prem Muñoz MD;  Location: [facility] OR     ESOPHAGOSCOPY, GASTROSCOPY, DUODENOSCOPY (EGD), COMBINED N/A 3/10/2022    Procedure: ESOPHAGOGASTRODUODENOSCOPY, WITH BIOPSY;  Surgeon: Prem Muñoz MD;  Location: [facility] OR     HC INSERTION INTRAUTERINE DEVICE  2013    Mirena     HC REMOVE INTRAUTERINE DEVICE  2014     MYOMECTOMY UTERUS  2017    benign fibroid       Social History     Tobacco Use     Smoking status: Never     Smokeless tobacco: Never   Substance Use Topics     Alcohol use: Never     Comment: social, occasionally     Family History   Problem Relation Age of Onset     Hypertension Mother      Cerebrovascular Disease Father          Current Outpatient Medications   Medication Sig Dispense Refill     acetaminophen (TYLENOL) 500 MG tablet Take 1-2 tablets (500-1,000 mg) by mouth every 6 hours as needed for mild pain 100 tablet 1     diclofenac (VOLTAREN) 1 % topical gel Apply 4 g topically 4 times daily 100 g 4     famotidine (PEPCID) 20 MG tablet TAKE 1 TABLET(20 MG) BY MOUTH TWICE DAILY 60 tablet 10     methocarbamol (ROBAXIN) 750 MG tablet Take 1 tablet (750 mg) by mouth 4 times daily as needed for muscle spasms 40 tablet 1     omeprazole (PRILOSEC) 20 MG DR capsule TAKE 1 CAPSULE(20 MG) BY MOUTH TWICE DAILY 180 capsule 1     topiramate (TOPAMAX) 25 MG tablet Take 1 tablet (25 mg) by mouth 2 times daily 60 tablet 1     meclizine (ANTIVERT) 25 MG tablet Take 25 mg by mouth (Patient not taking: Reported on 1/27/2023)       vitamin D3 (CHOLECALCIFEROL) 1.25 MG (22412 UT) capsule Take 1 capsule (50,000 Units) by mouth every 7 days (Patient not taking:  Reported on 2023) 12 capsule 0     No Known Allergies  Recent Labs   Lab Test 21  1847 18  1729 17  1623 17  0906   A1C  --   --  5.3  --    *  --   --  96   HDL 58  --   --  56   TRIG 154*  --   --  125   ALT 23 26  --   --    CR 1.09* 1.25*  --   --    GFRESTIMATED 62 47*  --   --    GFRESTBLACK 72 57*  --   --    POTASSIUM 4.1 3.8  --   --    TSH 1.59  --  2.27  --         Breast Cancer Screening:  Any new diagnosis of family breast, ovarian, or bowel cancer? No    FHS-7: No flowsheet data found.    Mammogram Screening: Recommended annual mammography  Pertinent mammograms are reviewed under the imaging tab.    History of abnormal Pap smear: NO - age 30-65 PAP every 5 years with negative HPV co-testing recommended     Reviewed and updated as needed this visit by clinical staff   Tobacco  Allergies  Meds              Reviewed and updated as needed this visit by Provider                 Past Medical History:   Diagnosis Date     Acid reflux      Fibroid 2017     Sickle cell trait (H)       Past Surgical History:   Procedure Laterality Date     COMBINED ESOPHAGOSCOPY, GASTROSCOPY, DUODENOSCOPY (EGD) WITH CO2 INSUFFLATION N/A 3/10/2022    Procedure: ESOPHAGOGASTRODUODENOSCOPY, WITH CO2 INSUFFLATION;  Surgeon: Prem Muñoz MD;  Location: [facility]     ESOPHAGOSCOPY, GASTROSCOPY, DUODENOSCOPY (EGD), COMBINED N/A 3/10/2022    Procedure: ESOPHAGOGASTRODUODENOSCOPY, WITH BIOPSY;  Surgeon: Prem Muñoz MD;  Location: [facility]     HC INSERTION INTRAUTERINE DEVICE      Mirena     HC REMOVE INTRAUTERINE DEVICE  2014     MYOMECTOMY UTERUS  2017    benign fibroid     OB History    Para Term  AB Living   2 2 2 0 0 2   SAB IAB Ectopic Multiple Live Births   0 0 0 0 2      # Outcome Date GA Lbr Caleb/2nd Weight Sex Delivery Anes PTL Lv   2 Term 2009    M    MADIE   1 Term     M    MADIE       Review of Systems   Constitutional: Negative for chills  "and fever.   HENT: Negative for congestion, ear pain, hearing loss and sore throat.    Eyes: Negative for pain and visual disturbance.   Respiratory: Negative for cough and shortness of breath.    Cardiovascular: Negative for chest pain, palpitations and peripheral edema.   Gastrointestinal: Positive for heartburn. Negative for abdominal pain, constipation, diarrhea, hematochezia and nausea.   Breasts:  Positive for tenderness. Negative for breast mass and discharge.   Genitourinary: Positive for pelvic pain and vaginal discharge. Negative for dysuria, frequency, genital sores, hematuria, urgency and vaginal bleeding.   Musculoskeletal: Positive for myalgias. Negative for arthralgias and joint swelling.   Skin: Negative for rash.   Neurological: Positive for dizziness and headaches. Negative for weakness and paresthesias.   Psychiatric/Behavioral: Negative for mood changes. The patient is not nervous/anxious.           OBJECTIVE:   BP (!) 142/83   Pulse 94   Temp 98.1  F (36.7  C) (Tympanic)   Resp 16   Ht 1.626 m (5' 4\")   Wt 85.3 kg (188 lb)   LMP 01/13/2023 (Approximate)   SpO2 100%   BMI 32.27 kg/m    Physical Exam  GENERAL: healthy, alert and no distress  NECK: no adenopathy, no asymmetry, masses, or scars and thyroid normal to palpation  RESP: lungs clear to auscultation - no rales, rhonchi or wheezes  CV: regular rate and rhythm, normal S1 S2, no S3 or S4, no murmur, click or rub, no peripheral edema and peripheral pulses strong  ABDOMEN: soft, nontender, no hepatosplenomegaly, no masses and bowel sounds normal  MS: no gross musculoskeletal defects noted, no edema        ASSESSMENT/PLAN:   (Z00.00) Routine general medical examination at a health care facility  (primary encounter diagnosis)  Comment: Preventive care reviewed and updated.    Plan: Comprehensive metabolic panel (BMP + Alb, Alk         Phos, ALT, AST, Total. Bili, TP), Hemoglobin         A1c, Lipid panel reflex to direct LDL         " "Non-fasting, CBC with platelets and         differential      (D57.3) Sickle cell trait (H)  Comment:   No current symptoms     Plan: Ferritin       Check cbc and  ferritin   Continue to monitor     (R10.2,  G89.29) Chronic pelvic pain in female  Comment: RT lower quadrant pain she has hx of fibroid   Plan: US Pelvic Complete with Transvaginal    (Z12.11) Colon cancer screening    Plan: Colonoscopy Screening  Referral        (Z12.31) Encounter for screening mammogram for breast cancer    Plan: MA SCREENING DIGITAL BILAT - Future  (s+30)      (Z11.3) Screen for STD (sexually transmitted disease)  She is sexually active with one partner   Plan: HIV Antigen Antibody Combo, Treponema Abs w         Reflex to RPR and Titer, Chlamydia trachomatis         PCR, Neisseria gonorrhoeae PCR      (E55.9) Vitamin D deficiency    Plan: Vitamin D Deficiency    Patient has been advised of split billing requirements and indicates understanding: Yes      COUNSELING:  Reviewed preventive health counseling, as reflected in patient instructions       Regular exercise       Healthy diet/nutrition       Immunizations    Vaccinated for: MMR and TDAP  , covid           BMI:   Estimated body mass index is 32.27 kg/m  as calculated from the following:    Height as of this encounter: 1.626 m (5' 4\").    Weight as of this encounter: 85.3 kg (188 lb).   Weight management plan: Discussed healthy diet and exercise guidelines      She reports that she has never smoked. She has never used smokeless tobacco.      Jason Hagen MD  [facility]  "

## 2023-01-27 NOTE — NURSING NOTE
Prior to immunization administration, verified patients identity using patient s name and date of birth. Please see Immunization Activity for additional information.     Screening Questionnaire for Adult Immunization    Are you sick today?   No   Do you have allergies to medications, food, a vaccine component or latex?   No   Have you ever had a serious reaction after receiving a vaccination?   No   Do you have a long-term health problem with heart, lung, kidney, or metabolic disease (e.g., diabetes), asthma, a blood disorder, no spleen, complement component deficiency, a cochlear implant, or a spinal fluid leak?  Are you on long-term aspirin therapy?   No   Do you have cancer, leukemia, HIV/AIDS, or any other immune system problem?   No   Do you have a parent, brother, or sister with an immune system problem?   No   In the past 3 months, have you taken medications that affect  your immune system, such as prednisone, other steroids, or anticancer drugs; drugs for the treatment of rheumatoid arthritis, Crohn s disease, or psoriasis; or have you had radiation treatments?   No   Have you had a seizure, or a brain or other nervous system problem?   No   During the past year, have you received a transfusion of blood or blood    products, or been given immune (gamma) globulin or antiviral drug?   No   For women: Are you pregnant or is there a chance you could become       pregnant during the next month?   No   Have you received any vaccinations in the past 4 weeks?   No     Immunization questionnaire answers were all negative.        Per orders of Dr. Hagen, injection of tdap, pneumo 20 & covid booster given by Yola Dixon CMA. Patient instructed to remain in clinic for 15 minutes afterwards, and to report any adverse reaction to me immediately.       Screening performed by Yola Dixon CMA on 1/27/2023 at 4:50 PM.

## 2023-01-28 LAB
C TRACH DNA SPEC QL NAA+PROBE: NEGATIVE
N GONORRHOEA DNA SPEC QL NAA+PROBE: NEGATIVE
T PALLIDUM AB SER QL: NONREACTIVE

## 2023-01-30 ENCOUNTER — TELEPHONE (OUTPATIENT)
Dept: FAMILY MEDICINE | Facility: CLINIC | Age: 48
End: 2023-01-30
Payer: COMMERCIAL

## 2023-01-30 LAB
DEPRECATED CALCIDIOL+CALCIFEROL SERPL-MC: 42 UG/L (ref 20–75)
HIV 1+2 AB+HIV1 P24 AG SERPL QL IA: NONREACTIVE

## 2023-01-30 NOTE — TELEPHONE ENCOUNTER
Called patient and left voicemail regarding test results.     RN: if patient calls back, please relay Dr. Hagen's result note with patient.      Hi Julianna,  Your hematoglobin or blood count is low which means you have anemia likely form heavy period   I recommend iron replacement then repeat labs in 4-6 weeks   I sent a prescription for iron , you should take 3 times daily   You also should follow up with obgyn for definitve treatment of uterine fibroid  to avoid future heavy period   Referral placed      Please continue with the plan as we discussed in the clinic.  Feel free to contact the clinic with any questions or concerns. Thank you for allowing us to participate in your care.     Jason Hagen MD.    Tamara Marrero RN

## 2023-01-30 NOTE — TELEPHONE ENCOUNTER
Patient returned call and writer relayed provider's message below. Writer informed iron prescription was sent to [facility] in [place] [facility]. Patient verbalized understanding and states will  medication.     ALISHA Figueroa  [facility]

## 2023-06-15 ENCOUNTER — OFFICE VISIT (OUTPATIENT)
Dept: URGENT CARE | Facility: URGENT CARE | Age: 48
End: 2023-06-15
Payer: COMMERCIAL

## 2023-06-15 ENCOUNTER — ANCILLARY PROCEDURE (OUTPATIENT)
Dept: GENERAL RADIOLOGY | Facility: CLINIC | Age: 48
End: 2023-06-15
Attending: NURSE PRACTITIONER
Payer: COMMERCIAL

## 2023-06-15 VITALS
TEMPERATURE: 97.5 F | BODY MASS INDEX: 32.79 KG/M2 | RESPIRATION RATE: 18 BRPM | WEIGHT: 191 LBS | OXYGEN SATURATION: 100 % | DIASTOLIC BLOOD PRESSURE: 90 MMHG | HEART RATE: 80 BPM | SYSTOLIC BLOOD PRESSURE: 163 MMHG

## 2023-06-15 DIAGNOSIS — R10.9 ABDOMINAL DISCOMFORT: ICD-10-CM

## 2023-06-15 DIAGNOSIS — N76.0 BACTERIAL VAGINOSIS: Primary | ICD-10-CM

## 2023-06-15 DIAGNOSIS — K59.00 CONSTIPATION, UNSPECIFIED CONSTIPATION TYPE: ICD-10-CM

## 2023-06-15 DIAGNOSIS — B96.89 BACTERIAL VAGINOSIS: Primary | ICD-10-CM

## 2023-06-15 DIAGNOSIS — Z72.51 UNPROTECTED SEXUAL INTERCOURSE: ICD-10-CM

## 2023-06-15 LAB
ALBUMIN UR-MCNC: NEGATIVE MG/DL
APPEARANCE UR: CLEAR
BACTERIA #/AREA URNS HPF: ABNORMAL /HPF
BILIRUB UR QL STRIP: NEGATIVE
CLUE CELLS: PRESENT
COLOR UR AUTO: YELLOW
ERYTHROCYTE [DISTWIDTH] IN BLOOD BY AUTOMATED COUNT: 14.2 % (ref 10–15)
GLUCOSE UR STRIP-MCNC: NEGATIVE MG/DL
HCG UR QL: NEGATIVE
HCT VFR BLD AUTO: 34.8 % (ref 35–47)
HGB BLD-MCNC: 11.8 G/DL (ref 11.7–15.7)
HGB UR QL STRIP: ABNORMAL
KETONES UR STRIP-MCNC: NEGATIVE MG/DL
LEUKOCYTE ESTERASE UR QL STRIP: NEGATIVE
MCH RBC QN AUTO: 25.9 PG (ref 26.5–33)
MCHC RBC AUTO-ENTMCNC: 33.9 G/DL (ref 31.5–36.5)
MCV RBC AUTO: 77 FL (ref 78–100)
NITRATE UR QL: NEGATIVE
PH UR STRIP: 6.5 [PH] (ref 5–7)
PLATELET # BLD AUTO: 243 10E3/UL (ref 150–450)
RBC # BLD AUTO: 4.55 10E6/UL (ref 3.8–5.2)
RBC #/AREA URNS AUTO: ABNORMAL /HPF
SP GR UR STRIP: 1.01 (ref 1–1.03)
SQUAMOUS #/AREA URNS AUTO: ABNORMAL /LPF
TRICHOMONAS, WET PREP: ABNORMAL
UROBILINOGEN UR STRIP-ACNC: 0.2 E.U./DL
WBC # BLD AUTO: 5.5 10E3/UL (ref 4–11)
WBC #/AREA URNS AUTO: ABNORMAL /HPF
WBC'S/HIGH POWER FIELD, WET PREP: ABNORMAL
YEAST, WET PREP: ABNORMAL

## 2023-06-15 PROCEDURE — 87591 N.GONORRHOEAE DNA AMP PROB: CPT | Performed by: NURSE PRACTITIONER

## 2023-06-15 PROCEDURE — 81025 URINE PREGNANCY TEST: CPT | Performed by: NURSE PRACTITIONER

## 2023-06-15 PROCEDURE — 81001 URINALYSIS AUTO W/SCOPE: CPT | Performed by: NURSE PRACTITIONER

## 2023-06-15 PROCEDURE — 87210 SMEAR WET MOUNT SALINE/INK: CPT | Performed by: NURSE PRACTITIONER

## 2023-06-15 PROCEDURE — 36415 COLL VENOUS BLD VENIPUNCTURE: CPT | Performed by: NURSE PRACTITIONER

## 2023-06-15 PROCEDURE — 86780 TREPONEMA PALLIDUM: CPT | Performed by: NURSE PRACTITIONER

## 2023-06-15 PROCEDURE — 87389 HIV-1 AG W/HIV-1&-2 AB AG IA: CPT | Performed by: NURSE PRACTITIONER

## 2023-06-15 PROCEDURE — 99214 OFFICE O/P EST MOD 30 MIN: CPT | Performed by: NURSE PRACTITIONER

## 2023-06-15 PROCEDURE — 85027 COMPLETE CBC AUTOMATED: CPT | Performed by: NURSE PRACTITIONER

## 2023-06-15 PROCEDURE — 74019 RADEX ABDOMEN 2 VIEWS: CPT | Mod: TC | Performed by: RADIOLOGY

## 2023-06-15 PROCEDURE — 87491 CHLMYD TRACH DNA AMP PROBE: CPT | Performed by: NURSE PRACTITIONER

## 2023-06-15 RX ORDER — METRONIDAZOLE 500 MG/1
500 TABLET ORAL 2 TIMES DAILY
Qty: 14 TABLET | Refills: 0 | Status: SHIPPED | OUTPATIENT
Start: 2023-06-15 | End: 2023-06-22

## 2023-06-15 NOTE — PROGRESS NOTES
Assessment & Plan     Bacterial vaginosis  - metroNIDAZOLE (FLAGYL) 500 MG tablet  Dispense: 14 tablet; Refill: 0    Constipation, unspecified constipation type    Abdominal discomfort  - UA Macroscopic with reflex to Microscopic and Culture  - Wet preparation  - HCG qualitative urine  - UA Macroscopic with reflex to Microscopic and Culture  - Wet preparation  - HCG qualitative urine  - UA Microscopic with Reflex to Culture  - CBC with platelets  - XR Abdomen 2 Views  - CBC with platelets    Unprotected sexual intercourse  - Chlamydia & Gonorrhea by PCR, [facility] - Clinic Collect  - HIV Antigen Antibody Combo  - Treponema Abs w Reflex to RPR and Titer  - Chlamydia & Gonorrhea by PCR, [facility] - Clinic Collect  - HIV Antigen Antibody Combo  - Treponema Abs w Reflex to RPR and Titer           Reviewed negative pregnancy test during visit. Reviewed UA showing no UTI or kidney infection. Reviewed wet prep showing no yeast infection or trich, but does show BV. Prescription sent to pharmacy for metronidazole twice daily for 7 days, no not drink alcohol while taking or 3 days afterwards. Reviewed CBC showing no sign of appendicitis or systemic infection. Reviewed abdominal xray images and results showing constipation. STD testing in process, will send results on [employer], consistent condom use recommended. Abdomen not acute currently. Recommend increasing fluids, fiber, physical activity, add Miralax 1 capful daily for constipation. COVID testing declined.     Follow-up with PCP if symptoms persist for 3 days, and sooner if symptoms worsen or new symptoms develop.     Discussed red flag symptoms which warrant immediate visit in emergency room    All questions were answered and patient verbalized understanding. AVS reviewed with patient.     Tina Jones, YING, APRN, CNP 6/15/2023 7:41 PM  [facility]          Leonard Levine is a 47 year old female who presents to clinic today for the  following health issues:  Chief Complaint   Patient presents with     Urgent Care     Abdominal Pain     Per patient symptoms started three days ago abdominal pain, pain is sharp, throbbing, does radiate to the back, and  headache.        Abdominal Pain    Location: upper and lower   Radiation: right flank    Pain character: sharp and throbbing   Severity: 7 on a scale of 1-10.    Duration: 3 day(s)   Course of Illness: fluctuating.  Exacerbated by: nothing  Relieved by: nothing.  Associated Symptoms: headache, nausea, breast tenderness  Surgical History: myomectomy for fibroids in 2017  She is on day 4 of her menstrual cycle  No history of STD. Had unprotected intercourse 4 weeks ago and would like STD testing. Is sexually active about 2 times per year.   Tylenol hasn't been helping, last had last night  Last BM yesterday was normal  Denies fever, chills, dysuria, urgency, frequency, vaginal discharge, itching, sores, diarrhea, constipation, emesis, cough, runny nose  She drinks about 4-5 bottles of water daily  She rarely drinks caffeine  No bubble baths  No hisotry of kidney stone or kidney infection. She has a history of elevated creatinine and is advised not to use NSAIDs. Last Creatinine 1.07 on 1/27/23.   She would like a pregnancy test today.      Problem list, Medication list, Allergies, and Medical history reviewed in EPIC.    ROS:  Review of systems negative except for noted above        Objective    BP (!) 163/90   Pulse 80   Temp 97.5  F (36.4  C) (Tympanic)   Resp 18   Wt 86.6 kg (191 lb)   LMP 06/12/2023 (Exact Date)   SpO2 100%   BMI 32.79 kg/m    Physical Exam  Constitutional:       General: She is not in acute distress.     Appearance: She is not toxic-appearing or diaphoretic.   Abdominal:      General: Bowel sounds are normal. There is no distension.      Palpations: Abdomen is soft.      Tenderness: There is generalized abdominal tenderness and tenderness in the right lower quadrant and  left upper quadrant. There is right CVA tenderness. There is no left CVA tenderness, guarding or rebound.   Lymphadenopathy:      Cervical: No cervical adenopathy.   Neurological:      Mental Status: She is alert.          X-ray abdomen was performed and reviewed independently by myself showing constipation  Labs and Radiologist impression:     Results for orders placed or performed in visit on 06/15/23   XR Abdomen 2 Views     Status: None    Narrative    EXAM: XR ABDOMEN 2 VIEWS  LOCATION: [facility] [place]  DATE: 6/15/2023    INDICATION: Generalized abdominal pain.  COMPARISON: None.      Impression    IMPRESSION: Nonobstructive bowel gas pattern. A moderate to large amount of stool is present throughout the ascending, transverse, and proximal descending colon, with a small amount of stool within the rectosigmoid colon. No pneumatosis or free   intraperitoneal air. No definite radiopaque urinary calculi are visualized, although evaluation is limited by overlying bowel contents.   Results for orders placed or performed in visit on 06/15/23   UA Macroscopic with reflex to Microscopic and Culture     Status: Abnormal    Specimen: Urine, Clean Catch   Result Value Ref Range    Color Urine Yellow Colorless, Straw, Light Yellow, Yellow    Appearance Urine Clear Clear    Glucose Urine Negative Negative mg/dL    Bilirubin Urine Negative Negative    Ketones Urine Negative Negative mg/dL    Specific Gravity Urine 1.010 1.003 - 1.035    Blood Urine Trace (A) Negative    pH Urine 6.5 5.0 - 7.0    Protein Albumin Urine Negative Negative mg/dL    Urobilinogen Urine 0.2 0.2, 1.0 E.U./dL    Nitrite Urine Negative Negative    Leukocyte Esterase Urine Negative Negative   HCG qualitative urine     Status: Normal   Result Value Ref Range    hCG Urine Qualitative Negative Negative   UA Microscopic with Reflex to Culture     Status: Abnormal   Result Value Ref Range    Bacteria Urine Few (A) None Seen /HPF    RBC Urine  0-2 0-2 /HPF /HPF    WBC Urine 0-5 0-5 /HPF /HPF    Squamous Epithelials Urine Few (A) None Seen /LPF    Narrative    Urine Culture not indicated   CBC with platelets     Status: Abnormal   Result Value Ref Range    WBC Count 5.5 4.0 - 11.0 10e3/uL    RBC Count 4.55 3.80 - 5.20 10e6/uL    Hemoglobin 11.8 11.7 - 15.7 g/dL    Hematocrit 34.8 (L) 35.0 - 47.0 %    MCV 77 (L) 78 - 100 fL    MCH 25.9 (L) 26.5 - 33.0 pg    MCHC 33.9 31.5 - 36.5 g/dL    RDW 14.2 10.0 - 15.0 %    Platelet Count 243 150 - 450 10e3/uL   Wet preparation     Status: Abnormal    Specimen: Vagina; Swab   Result Value Ref Range    Trichomonas Absent Absent    Yeast Absent Absent    Clue Cells Present (A) Absent    WBCs/high power field 1+ (A) None

## 2023-06-16 LAB
C TRACH DNA SPEC QL PROBE+SIG AMP: NEGATIVE
HIV 1+2 AB+HIV1 P24 AG SERPL QL IA: NONREACTIVE
N GONORRHOEA DNA SPEC QL NAA+PROBE: NEGATIVE
T PALLIDUM AB SER QL: NONREACTIVE

## 2023-08-02 ENCOUNTER — OFFICE VISIT (OUTPATIENT)
Dept: URGENT CARE | Facility: URGENT CARE | Age: 48
End: 2023-08-02
Payer: COMMERCIAL

## 2023-08-02 VITALS
HEART RATE: 88 BPM | DIASTOLIC BLOOD PRESSURE: 90 MMHG | OXYGEN SATURATION: 100 % | SYSTOLIC BLOOD PRESSURE: 144 MMHG | RESPIRATION RATE: 18 BRPM | BODY MASS INDEX: 32.27 KG/M2 | TEMPERATURE: 98.4 F | WEIGHT: 188 LBS

## 2023-08-02 DIAGNOSIS — J06.9 VIRAL URI WITH COUGH: Primary | ICD-10-CM

## 2023-08-02 DIAGNOSIS — R07.0 THROAT PAIN: ICD-10-CM

## 2023-08-02 DIAGNOSIS — R05.1 ACUTE COUGH: ICD-10-CM

## 2023-08-02 LAB — DEPRECATED S PYO AG THROAT QL EIA: NEGATIVE

## 2023-08-02 PROCEDURE — 99213 OFFICE O/P EST LOW 20 MIN: CPT | Performed by: FAMILY MEDICINE

## 2023-08-02 PROCEDURE — 87651 STREP A DNA AMP PROBE: CPT | Performed by: FAMILY MEDICINE

## 2023-08-02 RX ORDER — BENZONATATE 200 MG/1
200 CAPSULE ORAL 3 TIMES DAILY PRN
Qty: 21 CAPSULE | Refills: 0 | Status: SHIPPED | OUTPATIENT
Start: 2023-08-02 | End: 2024-01-05

## 2023-08-02 NOTE — PROGRESS NOTES
URGENT CARE VISIT:    ASSESSMENT AND PLAN:      ICD-10-CM    1. Viral URI with cough  J06.9 benzonatate (TESSALON) 200 MG capsule      2. Throat pain  R07.0 Streptococcus A Rapid Screen w/Reflex to PCR - Clinic Collect     Group A Streptococcus PCR Throat Swab      3. Acute cough  R05.1 benzonatate (TESSALON) 200 MG capsule          Differentials include but not limited to COVID-19, Bronchitis-viral, Peritonsillar abscess, Pneumonia, Strep pharyngitis, Tonsilitis, Viral pharyngitis, and Viral upper respiratory illness.  RST is negative today and will await PCR culture for antibiotics if needed.  Vital signs and physical examination are unremarkable today and suspect viral illness.  Tessalon Perles prescribed to aid with cough suppressant.  Supportive measures outlined in AVS.      Red flag symptoms for urgent evaluation via ED shared.      Follow up with primary care provider with any problems, questions or concerns or if symptoms worsen or fail to improve. Patient verbalized understanding and is agreeable to plan. The patient was discharged ambulatory and in stable condition.    SUBJECTIVE:   Julianna Dickey is a 48 year old female presenting with  for chief complaint of cough - productive and sore throat.  Onset was 3 day(s) ago.   She denies the following symptoms: fever, chills, wheezing, shortness of breath, vomiting, and diarrhea  Course of illness is same.    Treatment measures tried include Decongestants with some relief of symptoms.  Predisposing factors include None.      Home COVID testing negative    PMH:   Past Medical History:   Diagnosis Date    Acid reflux     Fibroid 2017    Sickle cell trait (H)      Allergies: Patient has no known allergies.   Medications:   Current Outpatient Medications   Medication Sig Dispense Refill    acetaminophen (TYLENOL) 500 MG tablet Take 1-2 tablets (500-1,000 mg) by mouth every 6 hours as needed for mild pain 100 tablet 1    benzonatate (TESSALON) 200 MG capsule Take 1  capsule (200 mg) by mouth 3 times daily as needed for cough 21 capsule 0    diclofenac (VOLTAREN) 1 % topical gel Apply 4 g topically 4 times daily 100 g 4    famotidine (PEPCID) 20 MG tablet TAKE 1 TABLET(20 MG) BY MOUTH TWICE DAILY 90 tablet 0    ferrous sulfate (FEROSUL) 325 (65 Fe) MG tablet Take 1 tablet (325 mg) by mouth 3 times daily (with meals) 270 tablet 1    methocarbamol (ROBAXIN) 750 MG tablet Take 1 tablet (750 mg) by mouth 4 times daily as needed for muscle spasms 40 tablet 1    omeprazole (PRILOSEC) 20 MG DR capsule TAKE 1 CAPSULE(20 MG) BY MOUTH TWICE DAILY 180 capsule 1    topiramate (TOPAMAX) 25 MG tablet Take 1 tablet (25 mg) by mouth 2 times daily 60 tablet 1    meclizine (ANTIVERT) 25 MG tablet Take 25 mg by mouth (Patient not taking: Reported on 1/27/2023)      vitamin D3 (CHOLECALCIFEROL) 1.25 MG (22764 UT) capsule Take 1 capsule (50,000 Units) by mouth every 7 days (Patient not taking: Reported on 1/27/2023) 12 capsule 0     Social History:   Social History     Tobacco Use    Smoking status: Never    Smokeless tobacco: Never   Substance Use Topics    Alcohol use: Never     Comment: social, occasionally       ROS:  Review of systems negative except as stated above.    OBJECTIVE:  BP (!) 144/90   Pulse 88   Temp 98.4  F (36.9  C) (Tympanic)   Resp 18   Wt 85.3 kg (188 lb)   LMP 06/12/2023 (Exact Date)   SpO2 100%   BMI 32.27 kg/m      GENERAL APPEARANCE: healthy, alert and no distress  EYES: EOMI,  PERRL, conjunctiva clear  HENT: ear canals and TM's normal.  Nose and mouth without ulcers, erythema or lesions  NECK: supple, nontender, no lymphadenopathy  RESP: lungs clear to auscultation - no rales, rhonchi or wheezes  CV: regular rates and rhythm, normal S1 S2, no murmur noted  SKIN: no suspicious lesions or rashes    Labs:      Results for orders placed or performed in visit on 08/02/23   Streptococcus A Rapid Screen w/Reflex to PCR - Clinic Collect     Status: Normal    Specimen:  Throat; Swab   Result Value Ref Range    Group A Strep antigen Negative Negative

## 2023-08-02 NOTE — PATIENT INSTRUCTIONS
Colds are caused by viruses. They can t be cured with antibiotics. However, you can relieve symptoms and support your body s efforts to heal itself. No matter which symptoms you have, be sure to drink plenty of fluids (water or clear soup); stop smoking and drinking alcohol; and get plenty of rest.      Understand a fever  Relax, lie down. Go to bed if you want. Just get off your feet and rest. Also, drink plenty of fluids to avoid dehydration.  Take acetaminophen or a nonsteroidal anti-inflammatory agent (NSAID), such as ibuprofen.  A fever is a normal reaction of your body to an illness. The temperature itself often isn t harmful. It actually helps your body fight infections. You don t need to treat a fever unless you feel very uncomfortable.   If the fever doesn t get better within 1 hour after you take acetaminophen, take ibuprofen. If this works, keep taking the ibuprofen every 6 to 8 hours.   If either medicine alone doesn t keep the fever down, you may switch off between the 2 medicines every 3 to 4 hours. For example, take ibuprofen. Wait 3 hours. Then take acetaminophen. Wait 3 hours. Take ibuprofen, and so on.  Treat a troubled nose kindly  Breathe steam or heated humidified air to open blocked nasal passages.  a hot shower or use a vaporizer. Be careful not to get burned by the steam.  Saline nasal sprays and decongestant tablets help open a stuffy nose. Antihistamines (Claritin, Zyrtec, etc.) can also help, but they can cause side effects such as drowsiness and drying of the eyes, nose, and mouth.  Nasal rinses such as Netti pot will help with the sinus congestion and nasal drainage.   Soothe a sore throat and cough  Gargle every 2 hours with 1/4 teaspoon of salt dissolved in 1/2 cup of warm water. Suck on throat lozenges and cough drops to moisten your throat.  Gargling with Chloraseptic spray   Cough medicines are available but it is unclear how effective they actually are.  Manuka Honey tbs  for cough suppressant   Take acetaminophen or an NSAID, such as ibuprofen to ease throat pain  Humidifier in room where you are sleeping.   Ease digestive problems  Put fluid back into your body. Take frequent sips of clear liquids such as water or broth. Do not drink beverages with a lot of sugar in them, such as juices and sodas. These can make diarrhea worse. Older children and adults can drink sports drinks.  Patient will need to drink at least 1.5-2 liters of fluids daily for adults and 1-1.5 liters for children. If vomiting and not tolerating liquids for more than 24 hrs, please go to your nearest emergency department for IV fluids and further treatment.   Maintain hydration by drinking small amounts of clear fluids frequently, then soft diet, and then advance diet as tolerated. May use any prescribed imodium if desired for any diarrhea. Call if symptoms worsen, high fever, severe weakness or fainting, increased abdominal pain, blood in stool or vomit, or failure to improve in 2-3 days.   As your appetite returns, you can resume your normal diet. Ask your doctor whether there are any foods you should avoid.  When to seek medical care  When you first notice symptoms, ask your health care provider if antiviral medications are appropriate. Antibiotics should not be taken for colds or flu. Also, call your doctor if you have any of the following symptoms or if you aren t feeling better after 7 days:  Shortness of breath  Pain or pressure in the chest or abdomen  Worsening symptoms, especially after a period of improvement  Fever that doesn t go down with medication  Sudden dizziness or confusion  Severe or continued vomiting  Signs of dehydration, including extreme thirst, dark urine, infrequent urination, dry mouth  Spotted, red, or very sore throat

## 2023-08-03 LAB — GROUP A STREP BY PCR: NOT DETECTED

## 2023-11-09 DIAGNOSIS — R10.13 DYSPEPSIA: ICD-10-CM

## 2023-12-29 NOTE — PROGRESS NOTES
SUBJECTIVE:   Julianna is a 48 year old, presenting for the following:  Physical        1/5/2024     8:21 AM   Additional Questions   Roomed by Nia Kramer MA   Accompanied by Self     Routine general medical examination at a health care facility  Completed    Gastroesophageal reflux disease with esophagitis without hemorrhage  Somewhat controlled   - Basic metabolic panel  (Ca, Cl, CO2, Creat, Gluc, K, Na, BUN); Future  - famotidine (PEPCID) 20 MG tablet; Take 1 tablet (20 mg) by mouth 2 times daily  - omeprazole (PRILOSEC) 20 MG DR capsule; Take 1 capsule (20 mg) by mouth 2 times daily  Refilled medications. Discussed in detail foods to avoid as triggers.      Iron deficiency anemia due to chronic blood loss  Recurrent   - CBC with platelets and differential; Future  - Ferritin; Future  - ferrous sulfate (FEROSUL) 325 (65 Fe) MG tablet; Take 1 tablet (325 mg) by mouth 2 times daily (with meals)  Was not taking iron supplementation. Refill at BID dosing for moderate microcytic hypochromic anemia.   Ferritin in process. Will plan recheck 6 weeks  Does have colonoscopy ordered today. No bloody stools    Sickle cell trait (H24)  No current symptoms    Lipid screening  Ordered  - Lipid panel reflex to direct LDL Fasting; Future  - Lipid panel reflex to direct LDL Fasting    Screen for colon cancer  Ordered   - Colonoscopy Screening  Referral; Future          Healthy Habits:     Getting at least 3 servings of Calcium per day:  NO    Bi-annual eye exam:  Yes    Dental care twice a year:  NO    Sleep apnea or symptoms of sleep apnea:  None    Diet:  Regular (no restrictions)    Frequency of exercise:  1 day/week    Duration of exercise:  15-30 minutes    Taking medications regularly:  Yes    Medication side effects:  None    Additional concerns today:  No          Social History     Tobacco Use    Smoking status: Never    Smokeless tobacco: Never   Substance Use Topics    Alcohol use: Never     Comment: social,  occasionally                 2024     8:23 AM   Alcohol Use   Prescreen: >3 drinks/day or >7 drinks/week? No          No data to display              Reviewed orders with patient.  Reviewed health maintenance and updated orders accordingly - Yes    Lab work is in process  Labs reviewed in EPIC    Breast Cancer Screenin/27/2023     4:04 PM   Breast CA Risk Assessment (FHS-7)   Do you have a family history of breast, colon, or ovarian cancer? No / Unknown         Mammogram Screening: Recommended annual mammography  Pertinent mammograms are reviewed under the imaging tab.    History of abnormal Pap smear: NO - age 30-65 PAP every 5 years with negative HPV co-testing recommended      10/30/2015    12:00 AM   PAP / HPV   PAP-ABSTRACT See Scanned Document           This result is from an external source.     Reviewed and updated as needed this visit by clinical staff   Tobacco  Allergies  Meds              Reviewed and updated as needed this visit by Provider                 Past Medical History:   Diagnosis Date    Acid reflux     Fibroid     Sickle cell trait (H24)       Past Surgical History:   Procedure Laterality Date    COMBINED ESOPHAGOSCOPY, GASTROSCOPY, DUODENOSCOPY (EGD) WITH CO2 INSUFFLATION N/A 3/10/2022    Procedure: ESOPHAGOGASTRODUODENOSCOPY, WITH CO2 INSUFFLATION;  Surgeon: Prem Muñoz MD;  Location: [facility] OR    ESOPHAGOSCOPY, GASTROSCOPY, DUODENOSCOPY (EGD), COMBINED N/A 3/10/2022    Procedure: ESOPHAGOGASTRODUODENOSCOPY, WITH BIOPSY;  Surgeon: Prem Muñoz MD;  Location: [facility] OR    HC INSERTION INTRAUTERINE DEVICE      Mirena    HC REMOVE INTRAUTERINE DEVICE  2014    MYOMECTOMY UTERUS  2017    benign fibroid     OB History    Para Term  AB Living   2 2 2 0 0 2   SAB IAB Ectopic Multiple Live Births   0 0 0 0 2      # Outcome Date GA Lbr Caleb/2nd Weight Sex Delivery Anes PTL Lv   2 Term     M    MADIE   1 Term     M    MADIE  "      Review of Systems   Constitutional:  Negative for chills and fever.   HENT:  Negative for congestion, ear pain, hearing loss and sore throat.    Eyes:  Positive for visual disturbance. Negative for pain.   Respiratory:  Negative for cough and shortness of breath.    Cardiovascular:  Negative for chest pain, palpitations and peripheral edema.   Gastrointestinal:  Positive for heartburn. Negative for abdominal pain, constipation, diarrhea, hematochezia and nausea.   Breasts:  Positive for tenderness. Negative for breast mass and discharge.   Genitourinary:  Negative for dysuria, frequency, genital sores, hematuria, pelvic pain, urgency, vaginal bleeding and vaginal discharge.   Musculoskeletal:  Negative for arthralgias, joint swelling and myalgias.   Skin:  Negative for rash.   Neurological:  Negative for dizziness, weakness, headaches and paresthesias.   Psychiatric/Behavioral:  Negative for mood changes. The patient is not nervous/anxious.           OBJECTIVE:   /86   Pulse 84   Temp 97.9  F (36.6  C) (Tympanic)   Resp 16   Ht 1.676 m (5' 6\")   Wt 87.5 kg (193 lb)   LMP 12/17/2023 (Approximate)   SpO2 100%   Breastfeeding No   BMI 31.15 kg/m      Physical Exam  GENERAL: healthy, alert and no distress  EYES: Eyes grossly normal to inspection, PERRL and conjunctivae and sclerae normal  HENT: ear canals and TM's normal, nose and mouth without ulcers or lesions  NECK: no adenopathy, no asymmetry, masses, or scars and thyroid normal to palpation  RESP: lungs clear to auscultation - no rales, rhonchi or wheezes  CV: regular rate and rhythm, normal S1 S2, no S3 or S4, no murmur, click or rub, no peripheral edema and peripheral pulses strong  ABDOMEN: soft, nontender, no hepatosplenomegaly, no masses and bowel sounds normal  MS: no gross musculoskeletal defects noted, no edema  SKIN: no suspicious lesions or rashes  PSYCH: mentation appears normal, affect normal/bright    Diagnostic Test Results:  Labs " "reviewed in [facility]      Patient has been advised of split billing requirements and indicates understanding: Yes      COUNSELING:  Reviewed preventive health counseling, as reflected in patient instructions       Regular exercise       Healthy diet/nutrition       Alcohol Use       Colorectal Cancer Screening      BMI:   Estimated body mass index is 31.15 kg/m  as calculated from the following:    Height as of this encounter: 1.676 m (5' 6\").    Weight as of this encounter: 87.5 kg (193 lb).   Weight management plan: Discussed healthy diet and exercise guidelines      She reports that she has never smoked. She has never used smokeless tobacco.          JAYDA MARTINEZ PA-C  [facility]  "

## 2024-01-05 ENCOUNTER — OFFICE VISIT (OUTPATIENT)
Dept: FAMILY MEDICINE | Facility: CLINIC | Age: 49
End: 2024-01-05
Payer: COMMERCIAL

## 2024-01-05 VITALS
HEIGHT: 66 IN | TEMPERATURE: 97.9 F | SYSTOLIC BLOOD PRESSURE: 137 MMHG | BODY MASS INDEX: 31.02 KG/M2 | DIASTOLIC BLOOD PRESSURE: 86 MMHG | WEIGHT: 193 LBS | HEART RATE: 84 BPM | OXYGEN SATURATION: 100 % | RESPIRATION RATE: 16 BRPM

## 2024-01-05 DIAGNOSIS — Z12.11 SCREEN FOR COLON CANCER: ICD-10-CM

## 2024-01-05 DIAGNOSIS — D50.0 IRON DEFICIENCY ANEMIA DUE TO CHRONIC BLOOD LOSS: ICD-10-CM

## 2024-01-05 DIAGNOSIS — Z00.00 ROUTINE GENERAL MEDICAL EXAMINATION AT A HEALTH CARE FACILITY: Primary | ICD-10-CM

## 2024-01-05 DIAGNOSIS — K21.00 GASTROESOPHAGEAL REFLUX DISEASE WITH ESOPHAGITIS WITHOUT HEMORRHAGE: ICD-10-CM

## 2024-01-05 DIAGNOSIS — D57.3 SICKLE CELL TRAIT (H): ICD-10-CM

## 2024-01-05 DIAGNOSIS — Z13.220 LIPID SCREENING: ICD-10-CM

## 2024-01-05 DIAGNOSIS — R10.13 DYSPEPSIA: ICD-10-CM

## 2024-01-05 LAB
ANION GAP SERPL CALCULATED.3IONS-SCNC: 9 MMOL/L (ref 7–15)
BASOPHILS # BLD AUTO: 0.1 10E3/UL (ref 0–0.2)
BASOPHILS NFR BLD AUTO: 1 %
BUN SERPL-MCNC: 11 MG/DL (ref 6–20)
CALCIUM SERPL-MCNC: 9.2 MG/DL (ref 8.6–10)
CHLORIDE SERPL-SCNC: 105 MMOL/L (ref 98–107)
CHOLEST SERPL-MCNC: 194 MG/DL
CREAT SERPL-MCNC: 1.21 MG/DL (ref 0.51–0.95)
DEPRECATED HCO3 PLAS-SCNC: 27 MMOL/L (ref 22–29)
EGFRCR SERPLBLD CKD-EPI 2021: 56 ML/MIN/1.73M2
EOSINOPHIL # BLD AUTO: 0.2 10E3/UL (ref 0–0.7)
EOSINOPHIL NFR BLD AUTO: 4 %
ERYTHROCYTE [DISTWIDTH] IN BLOOD BY AUTOMATED COUNT: 17.8 % (ref 10–15)
FASTING STATUS PATIENT QL REPORTED: YES
FERRITIN SERPL-MCNC: 9 NG/ML (ref 6–175)
GLUCOSE SERPL-MCNC: 97 MG/DL (ref 70–99)
HCT VFR BLD AUTO: 32 % (ref 35–47)
HDLC SERPL-MCNC: 52 MG/DL
HGB BLD-MCNC: 9.8 G/DL (ref 11.7–15.7)
IMM GRANULOCYTES # BLD: 0 10E3/UL
IMM GRANULOCYTES NFR BLD: 0 %
LDLC SERPL CALC-MCNC: 114 MG/DL
LYMPHOCYTES # BLD AUTO: 2.3 10E3/UL (ref 0.8–5.3)
LYMPHOCYTES NFR BLD AUTO: 42 %
MCH RBC QN AUTO: 18.6 PG (ref 26.5–33)
MCHC RBC AUTO-ENTMCNC: 30.6 G/DL (ref 31.5–36.5)
MCV RBC AUTO: 61 FL (ref 78–100)
MONOCYTES # BLD AUTO: 0.6 10E3/UL (ref 0–1.3)
MONOCYTES NFR BLD AUTO: 10 %
NEUTROPHILS # BLD AUTO: 2.3 10E3/UL (ref 1.6–8.3)
NEUTROPHILS NFR BLD AUTO: 42 %
NONHDLC SERPL-MCNC: 142 MG/DL
PLATELET # BLD AUTO: 362 10E3/UL (ref 150–450)
POTASSIUM SERPL-SCNC: 4.7 MMOL/L (ref 3.4–5.3)
RBC # BLD AUTO: 5.27 10E6/UL (ref 3.8–5.2)
SODIUM SERPL-SCNC: 141 MMOL/L (ref 135–145)
TRIGL SERPL-MCNC: 138 MG/DL
WBC # BLD AUTO: 5.4 10E3/UL (ref 4–11)

## 2024-01-05 PROCEDURE — 82728 ASSAY OF FERRITIN: CPT | Performed by: PHYSICIAN ASSISTANT

## 2024-01-05 PROCEDURE — 36415 COLL VENOUS BLD VENIPUNCTURE: CPT | Performed by: PHYSICIAN ASSISTANT

## 2024-01-05 PROCEDURE — 80048 BASIC METABOLIC PNL TOTAL CA: CPT | Performed by: PHYSICIAN ASSISTANT

## 2024-01-05 PROCEDURE — 80061 LIPID PANEL: CPT | Performed by: PHYSICIAN ASSISTANT

## 2024-01-05 PROCEDURE — 99396 PREV VISIT EST AGE 40-64: CPT | Performed by: PHYSICIAN ASSISTANT

## 2024-01-05 PROCEDURE — 85025 COMPLETE CBC W/AUTO DIFF WBC: CPT | Performed by: PHYSICIAN ASSISTANT

## 2024-01-05 PROCEDURE — 99214 OFFICE O/P EST MOD 30 MIN: CPT | Mod: 25 | Performed by: PHYSICIAN ASSISTANT

## 2024-01-05 RX ORDER — FERROUS SULFATE 325(65) MG
325 TABLET ORAL 2 TIMES DAILY WITH MEALS
Qty: 180 TABLET | Refills: 1 | Status: SHIPPED | OUTPATIENT
Start: 2024-01-05

## 2024-01-05 RX ORDER — FAMOTIDINE 20 MG/1
20 TABLET, FILM COATED ORAL 2 TIMES DAILY
Qty: 180 TABLET | Refills: 3 | Status: SHIPPED | OUTPATIENT
Start: 2024-01-05

## 2024-01-05 ASSESSMENT — ENCOUNTER SYMPTOMS
WEAKNESS: 0
SHORTNESS OF BREATH: 0
PALPITATIONS: 0
NAUSEA: 0
ABDOMINAL PAIN: 0
HEMATOCHEZIA: 0
SORE THROAT: 0
HEMATURIA: 0
DYSURIA: 0
COUGH: 0
NERVOUS/ANXIOUS: 0
EYE PAIN: 0
FREQUENCY: 0
CONSTIPATION: 0
CHILLS: 0
PARESTHESIAS: 0
FEVER: 0
DIZZINESS: 0
MYALGIAS: 0
BREAST MASS: 0
ARTHRALGIAS: 0
JOINT SWELLING: 0
HEARTBURN: 1
DIARRHEA: 0
HEADACHES: 0

## 2024-01-05 ASSESSMENT — PAIN SCALES - GENERAL: PAINLEVEL: NO PAIN (0)

## 2024-01-05 NOTE — COMMUNITY RESOURCES LIST (ENGLISH)
01/05/2024   [facility]  N/A  For questions about this resource list or additional care needs, please contact your primary care clinic or care manager.  Phone: [phone]   Email: N/A   Address: [address] [place] [address]   Hours: N/A        Financial Stability       Rent and mortgage payment assistance  1  [facility] - Family Homeless Prevention Assistance Project (FHPA) Distance: 5.15 miles      Phone/Virtual   [address] [facility] [address] [place] [address]  Language: English  Hours: Mon - Fri 8:30 AM - 12:00 PM , Mon - Fri 1:00 PM - 4:00 PM  Fees: Free   Phone: (795) 342-5582 Email: jason@[facility].ELIKE.nkf-pharma Website: https://www.[employer].org/usn/     2  [employer] Distance: 6.4 miles      Phone/Virtual   [address] [place] [address]  Language: English  Hours: Mon - Fri 9:00 AM - 5:00 PM  Fees: Free   Phone: (241) 624-4042 Email: uazhajwaw8itkxfsr@[employer] Website: https://kvhdncvte6tummnra.org/     Utility payment assistance  3  [facility] Distance: 5.15 miles      Phone/Virtual   [address] [address]  Language: English  Hours: Mon - Fri 8:30 AM - 12:00 PM , Mon - Fri 1:00 PM - 4:00 PM  Fees: Free   Phone: (334) 607-2891 Email: jason@[facility].ELIKE.nkf-pharma Website: https://www.[employer].org/usn/     4  [facility] Community [employer] (ACCAP) - Energy Assistance Program Distance: 5.16 miles      In-Person, Phone/Virtual   [address] [place] [address]  Language: English  Hours: Mon - Fri 8:00 AM - 4:30 PM  Fees: Free   Phone: (247) 622-8456 Email: accap@accap.org Website: http://www.accap.org          Food and Nutrition       Food pantry  5  [facility] - Family Table Meals - [facility] Food Shelf Distance: 0.87 miles      [place] [id number] [place] [address]  Language: English  Hours: Wed 12:00 PM - 2:00 PM  Fees: Free   Phone: (161) 285-3814 Email: AdoTube@[employer] Website: http://www.[facility].Cortica     6  [facility] Distance: 1.3 miles      Pickup   [address] [place] [address]  Language: English  Hours: Wed 2:30 PM - 4:30 PM  Fees: Free   Phone: (835) 508-2089 Email: office@[employer] Website: http://www.[employer]Bank.Cortica     SNAP application assistance  7  [facility] Economic Assistance Department Distance: 5.16 miles      Phone/Virtual   [address] [address]  Language: English  Hours: Mon - Fri 8:15 AM - 4:00 PM  Fees: Free   Phone: (883) 521-7812 Email: paperwork@[facility]. Website: http://www.[facility]./193/Economic-Assistance     8  [facility] -  [employer] (AIFW) Distance: 7.9 miles      In-Person, Phone/Virtual   [address] [place] [address]  Language: Thai, Italian, English, Gujarati, Taqueria, Albanian, French, Yoruba, Estonian, Persian  Hours: Mon - Wed 9:00 AM - 5:00 PM , Thu 12:00 PM - 6:00 PM , Fri 9:00 AM - 5:00 PM , Sun 10:30 AM - 2:00 PM Appt. Only  Fees: Free   Phone: (730) 471-2133 Email: info@sewa-aifw.org Website: https://www.sewa-aifw.org/     Soup kitchen or free meals  9  [facility] - Family Table Meals - Family Table Meal Distance: 0.87 miles      Pickup   [address] [place] [address]  Language: English  Hours: Thu 5:00 PM - 6:30 PM  Fees: Free   Phone: (502) 903-8682 Email: AdoTube@[facility].org Website: http://www.[facility].org     10  [facility] - Family Table Meal Distance: 1.3 miles      In-Person, Pickup   [address] [place] [address]  Language: English  Hours: Thu 5:30 PM - 6:30 PM  Fees: Free   Phone: (294) 603-5289 Email: office@[facility].[employer]ort[employer] Website: http://www.[facility].org          Transportation       Free  or low-cost transportation  11  [employer] Distance: 14.34 miles      In-Person   [address] [place] [address]  Language: English  Hours: Mon - Fri 6:00 AM - 6:00 PM  Fees: Insurance, Self Pay   Phone: (934) 135-6200 Email: info@[employer] Website: http://www.[employer].Seevibes     12  [facility] Distance: 15.03 miles      In-Person   [address] [place] [address]  Language: English  Hours: Mon - Wed 9:30 AM - 12:00 PM , Mon - Wed 1:00 PM - 2:00 PM Appt. Only  Fees: Free   Phone: (198) 124-6762 Email: info@saintolaf.org Website: http://www.saintolaf.org/     Transportation to medical appointments  13  [facility]   Family [facility] (AIF) Distance: 7.9 miles      In-Person   [address] [place] [address]  Language: Latvian, Malay, English, Gujarati, Taqueria, Czech, Estonian, Romanian, Lithuanian, Faroese  Hours: Mon - Wed 9:00 AM - 5:00 PM , Thu 12:00 PM - 6:00 PM , Fri 9:00 AM - 5:00 PM , Sun 10:30 AM - 2:00 PM Appt. Only  Fees: Free   Phone: (946) 817-2755 Email: info@[facility].org Website: https://www.[facility].org/     14  [place] Transportation Distance: 10.11 miles      In-Person   [address] [facility] [address] [place] [address]  Language: English  Hours: Mon - Sat 4:00 AM - 6:00 PM  Fees: Insurance, Self Pay   Phone: (257) 807-4447 Email: delighttransportation1@[employer].Seev[employer]es Website: https://helpmeconnect.web.[facility].us/HelpMeConnect/Providers/Delight_Transportation/Transportation/2?returnUrl=%2FHelpMeConnect%2FSearch%2FBasicNeeds%2FTransportation%2FTransportationServices%3Fstart%3D40          Important Numbers & Websites       Emergency Services   911  [facility] Services   311  Poison Control   (923) 171-9335  Suicide Prevention Lifeline   (718) 167-1438 (TALK)  Child Abuse Hotline   (493) 287-8835 (4-A-Child)  Sexual Assault Hotline   (193) 763-3100 (HOPE)  National Runaway Safeline   (219) 380-6402 (RUNAWAY)  All-Options  Talkline   (974) 759-9107  Substance Abuse Referral   (449) 727-8076 (HELP)

## 2024-02-09 ENCOUNTER — ANCILLARY ORDERS (OUTPATIENT)
Dept: FAMILY MEDICINE | Facility: CLINIC | Age: 49
End: 2024-02-09

## 2024-02-09 DIAGNOSIS — Z12.31 VISIT FOR SCREENING MAMMOGRAM: Primary | ICD-10-CM

## 2024-02-11 ENCOUNTER — HEALTH MAINTENANCE LETTER (OUTPATIENT)
Age: 49
End: 2024-02-11

## 2024-03-07 ENCOUNTER — ANCILLARY PROCEDURE (OUTPATIENT)
Dept: MAMMOGRAPHY | Facility: CLINIC | Age: 49
End: 2024-03-07
Payer: COMMERCIAL

## 2024-03-07 DIAGNOSIS — Z12.31 VISIT FOR SCREENING MAMMOGRAM: ICD-10-CM

## 2024-03-07 PROCEDURE — 77067 SCR MAMMO BI INCL CAD: CPT | Mod: TC | Performed by: RADIOLOGY

## 2024-03-07 PROCEDURE — 77063 BREAST TOMOSYNTHESIS BI: CPT | Mod: TC | Performed by: RADIOLOGY

## 2024-10-28 ENCOUNTER — TELEPHONE (OUTPATIENT)
Dept: FAMILY MEDICINE | Facility: CLINIC | Age: 49
End: 2024-10-28
Payer: COMMERCIAL

## 2024-10-28 NOTE — TELEPHONE ENCOUNTER
Patient Quality Outreach    Patient is due for the following:   Colon Cancer Screening    Next Steps:   Schedule a office visit for colonoscopy    Type of outreach:    Sent Intrakr message.    Next Steps:  Reach out within 90 days via Intrakr.    Max number of attempts reached: No. Will try again in 90 days if patient still on fail list.    Questions for provider review:    None           Nia Kramer MA

## 2025-02-08 ENCOUNTER — HEALTH MAINTENANCE LETTER (OUTPATIENT)
Age: 50
End: 2025-02-08

## 2025-02-25 ENCOUNTER — OFFICE VISIT (OUTPATIENT)
Dept: FAMILY MEDICINE | Facility: CLINIC | Age: 50
End: 2025-02-25

## 2025-02-25 VITALS
DIASTOLIC BLOOD PRESSURE: 85 MMHG | SYSTOLIC BLOOD PRESSURE: 153 MMHG | RESPIRATION RATE: 16 BRPM | WEIGHT: 198.2 LBS | TEMPERATURE: 97.6 F | BODY MASS INDEX: 31.85 KG/M2 | HEIGHT: 66 IN | HEART RATE: 86 BPM | OXYGEN SATURATION: 100 %

## 2025-02-25 DIAGNOSIS — N18.2 CKD (CHRONIC KIDNEY DISEASE) STAGE 2, GFR 60-89 ML/MIN: ICD-10-CM

## 2025-02-25 DIAGNOSIS — E55.9 VITAMIN D DEFICIENCY: ICD-10-CM

## 2025-02-25 DIAGNOSIS — G89.29 CHRONIC MIDLINE LOW BACK PAIN WITH LEFT-SIDED SCIATICA: ICD-10-CM

## 2025-02-25 DIAGNOSIS — R03.0 ELEVATED BP WITHOUT DIAGNOSIS OF HYPERTENSION: ICD-10-CM

## 2025-02-25 DIAGNOSIS — D50.0 IRON DEFICIENCY ANEMIA DUE TO CHRONIC BLOOD LOSS: ICD-10-CM

## 2025-02-25 DIAGNOSIS — M54.42 CHRONIC MIDLINE LOW BACK PAIN WITH LEFT-SIDED SCIATICA: ICD-10-CM

## 2025-02-25 DIAGNOSIS — Z11.3 SCREEN FOR STD (SEXUALLY TRANSMITTED DISEASE): ICD-10-CM
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
## 2025-02-25 DIAGNOSIS — M54.6 ACUTE BILATERAL THORACIC BACK PAIN: ICD-10-CM

## 2025-02-25 PROCEDURE — 99214 OFFICE O/P EST MOD 30 MIN: CPT | Performed by: FAMILY MEDICINE

## 2025-02-25 PROCEDURE — 3079F DIAST BP 80-89 MM HG: CPT | Performed by: FAMILY MEDICINE

## 2025-02-25 PROCEDURE — G2211 COMPLEX E/M VISIT ADD ON: HCPCS | Performed by: FAMILY MEDICINE

## 2025-02-25 PROCEDURE — 3077F SYST BP >= 140 MM HG: CPT | Performed by: FAMILY MEDICINE

## 2025-02-25 PROCEDURE — 1125F AMNT PAIN NOTED PAIN PRSNT: CPT | Performed by: FAMILY MEDICINE

## 2025-02-25 RX ORDER — CYCLOBENZAPRINE HCL 5 MG
5 TABLET ORAL 3 TIMES DAILY PRN
Qty: 30 TABLET | Refills: 1 | Status: SHIPPED | OUTPATIENT
Start: 2025-02-25

## 2025-02-25 ASSESSMENT — PAIN SCALES - GENERAL: PAINLEVEL_OUTOF10: SEVERE PAIN (10)
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
## 2025-02-25 NOTE — PATIENT INSTRUCTIONS
At [facility], we strive to deliver an exceptional experience to you, every time we see you. If you receive a survey, please let us know what we are doing well and/or what we could improve upon, as we do value your feedback.  If you have MyChart, you can expect to receive results automatically within 24 hours of their completion.  Your provider will send a note interpreting your results as well.   If you do not have MyChart, you should receive your results in about a week by mail.    Your care team:                            Family Medicine Internal Medicine   MD Deepak Brown, MD Sahara Flowers, MD Hemant Mcneill, MD Tonie Thayer, PAAlexC    Placido Nunn, MD Pediatrics   Lisa Noguera, MD Shaina Merrill, MD Ekaterina Chapman, APRN CNP Aditi Garcia APRN CNP   MD Dayna Roper, MD Amalia Orozco, CNP     Darinel Mcbride, CNP Same-Day Provider (No follow-up visits)   STEPHANIA Tay, DNP Ira Rocha, STEPHANIA Sandy, FNP, BC LIN SonC     Clinic hours: Monday - Thursday 7 am-6 pm; Fridays 7 am-5 pm.   Urgent care: Monday - Friday 10 am- 8 pm; Saturday and Sunday 9 am-5 pm.    Clinic: [phone]       [facility]: Monday - Thursday 8 am - 7 pm; Friday 8 am - 6 pm  [facility]: [phone]

## 2025-03-01 ENCOUNTER — THERAPY VISIT (OUTPATIENT)
Dept: PHYSICAL THERAPY | Facility: CLINIC | Age: 50
End: 2025-03-01
Payer: COMMERCIAL

## 2025-03-01 DIAGNOSIS — M54.50 BILATERAL LOW BACK PAIN WITHOUT SCIATICA, UNSPECIFIED CHRONICITY: Primary | ICD-10-CM

## 2025-03-01 PROCEDURE — 97161 PT EVAL LOW COMPLEX 20 MIN: CPT | Mod: GP | Performed by: PHYSICAL THERAPIST

## 2025-03-01 PROCEDURE — 97110 THERAPEUTIC EXERCISES: CPT | Mod: GP | Performed by: PHYSICAL THERAPIST

## 2025-03-01 PROCEDURE — 97014 ELECTRIC STIMULATION THERAPY: CPT | Mod: GP | Performed by: PHYSICAL THERAPIST
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
## 2025-03-08 ENCOUNTER — LAB (OUTPATIENT)
Dept: LAB | Facility: CLINIC | Age: 50
End: 2025-03-08

## 2025-03-08 DIAGNOSIS — N18.2 CKD (CHRONIC KIDNEY DISEASE) STAGE 2, GFR 60-89 ML/MIN: ICD-10-CM

## 2025-03-08 DIAGNOSIS — D50.0 IRON DEFICIENCY ANEMIA DUE TO CHRONIC BLOOD LOSS: ICD-10-CM

## 2025-03-08 DIAGNOSIS — E55.9 VITAMIN D DEFICIENCY: ICD-10-CM

## 2025-03-08 DIAGNOSIS — Z11.3 SCREEN FOR STD (SEXUALLY TRANSMITTED DISEASE): ICD-10-CM

## 2025-03-08 LAB
ALBUMIN SERPL BCG-MCNC: 4.6 G/DL (ref 3.5–5.2)
ALP SERPL-CCNC: 50 U/L (ref 40–150)
ALT SERPL W P-5'-P-CCNC: 11 U/L (ref 0–50)
ANION GAP SERPL CALCULATED.3IONS-SCNC: 11 MMOL/L (ref 7–15)
AST SERPL W P-5'-P-CCNC: 24 U/L (ref 0–45)
BILIRUB SERPL-MCNC: 0.7 MG/DL
BUN SERPL-MCNC: 10.2 MG/DL (ref 6–20)
CALCIUM SERPL-MCNC: 9.8 MG/DL (ref 8.8–10.4)
CHLORIDE SERPL-SCNC: 104 MMOL/L (ref 98–107)
CHOLEST SERPL-MCNC: 200 MG/DL
CREAT SERPL-MCNC: 1.23 MG/DL (ref 0.51–0.95)
EGFRCR SERPLBLD CKD-EPI 2021: 54 ML/MIN/1.73M2
FASTING STATUS PATIENT QL REPORTED: YES
FASTING STATUS PATIENT QL REPORTED: YES
FERRITIN SERPL-MCNC: 12 NG/ML (ref 6–175)
GLUCOSE SERPL-MCNC: 92 MG/DL (ref 70–99)
HBV SURFACE AG SERPL QL IA: NONREACTIVE
HCG SERPL QL: NEGATIVE
HCO3 SERPL-SCNC: 25 MMOL/L (ref 22–29)
HCV AB SERPL QL IA: NONREACTIVE
HDLC SERPL-MCNC: 45 MG/DL
HIV 1+2 AB+HIV1 P24 AG SERPL QL IA: NONREACTIVE
LDLC SERPL CALC-MCNC: 136 MG/DL
MAGNESIUM SERPL-MCNC: 2.2 MG/DL (ref 1.7–2.3)
NONHDLC SERPL-MCNC: 155 MG/DL
POTASSIUM SERPL-SCNC: 4.1 MMOL/L (ref 3.4–5.3)
PROT SERPL-MCNC: 7.8 G/DL (ref 6.4–8.3)
SODIUM SERPL-SCNC: 140 MMOL/L (ref 135–145)
TRIGL SERPL-MCNC: 96 MG/DL
VIT D+METAB SERPL-MCNC: 18 NG/ML (ref 20–50)

## 2025-03-08 PROCEDURE — 86803 HEPATITIS C AB TEST: CPT

## 2025-03-08 PROCEDURE — 87340 HEPATITIS B SURFACE AG IA: CPT

## 2025-03-08 PROCEDURE — 87389 HIV-1 AG W/HIV-1&-2 AB AG IA: CPT

## 2025-03-08 PROCEDURE — 83735 ASSAY OF MAGNESIUM: CPT

## 2025-03-08 PROCEDURE — 36415 COLL VENOUS BLD VENIPUNCTURE: CPT

## 2025-03-08 PROCEDURE — 82728 ASSAY OF FERRITIN: CPT

## 2025-03-08 PROCEDURE — 80061 LIPID PANEL: CPT

## 2025-03-08 PROCEDURE — 82306 VITAMIN D 25 HYDROXY: CPT

## 2025-03-08 PROCEDURE — 86780 TREPONEMA PALLIDUM: CPT

## 2025-03-08 PROCEDURE — 84703 CHORIONIC GONADOTROPIN ASSAY: CPT

## 2025-03-08 PROCEDURE — 80053 COMPREHEN METABOLIC PANEL: CPT

## 2025-03-09 LAB
C TRACH DNA SPEC QL PROBE+SIG AMP: NEGATIVE
N GONORRHOEA DNA SPEC QL NAA+PROBE: NEGATIVE
SPECIMEN TYPE: NORMAL
T PALLIDUM AB SER QL: NONREACTIVE

## 2025-03-10 ENCOUNTER — TELEPHONE (OUTPATIENT)
Dept: FAMILY MEDICINE | Facility: CLINIC | Age: 50
End: 2025-03-10

## 2025-03-10 DIAGNOSIS — E55.9 VITAMIN D DEFICIENCY: Primary | ICD-10-CM

## 2025-03-10 DIAGNOSIS — D50.0 IRON DEFICIENCY ANEMIA DUE TO CHRONIC BLOOD LOSS: ICD-10-CM

## 2025-03-10 LAB
ELLIPTOCYTES BLD QL SMEAR: ABNORMAL
ERYTHROCYTE [DISTWIDTH] IN BLOOD BY AUTOMATED COUNT: 20.8 % (ref 10–15)
GIANT PLATELETS BLD QL SMEAR: SLIGHT
HCT VFR BLD AUTO: 33.6 % (ref 35–47)
HGB BLD-MCNC: 9.9 G/DL (ref 11.7–15.7)
MCH RBC QN AUTO: 17.2 PG (ref 26.5–33)
MCHC RBC AUTO-ENTMCNC: 29.5 G/DL (ref 31.5–36.5)
MCV RBC AUTO: 58 FL (ref 78–100)
PATH REV: ABNORMAL
PLAT MORPH BLD: ABNORMAL
PLATELET # BLD AUTO: 362 10E3/UL (ref 150–450)
POLYCHROMASIA BLD QL SMEAR: SLIGHT
RBC # BLD AUTO: 5.76 10E6/UL (ref 3.8–5.2)
RBC MORPH BLD: ABNORMAL
TARGETS BLD QL SMEAR: SLIGHT
WBC # BLD AUTO: 4.2 10E3/UL (ref 4–11)

## 2025-03-10 NOTE — TELEPHONE ENCOUNTER
Patient Quality Outreach    Patient is due for the following:   Cervical Cancer Screening - PAP Needed    Action(s) Taken:   Patient has upcoming appointment, these items will be addressed at that time.    Type of outreach:    Chart review performed, no outreach needed.    Questions for provider review:    None           Nia Kramer MA

## 2025-03-13 ENCOUNTER — OFFICE VISIT (OUTPATIENT)
Dept: FAMILY MEDICINE | Facility: CLINIC | Age: 50
End: 2025-03-13

## 2025-03-13 VITALS
OXYGEN SATURATION: 100 % | HEART RATE: 91 BPM | SYSTOLIC BLOOD PRESSURE: 119 MMHG | RESPIRATION RATE: 16 BRPM | HEIGHT: 66 IN | BODY MASS INDEX: 30.28 KG/M2 | DIASTOLIC BLOOD PRESSURE: 73 MMHG | WEIGHT: 188.4 LBS | TEMPERATURE: 97.5 F

## 2025-03-13 DIAGNOSIS — Z12.11 SCREEN FOR COLON CANCER: ICD-10-CM

## 2025-03-13 DIAGNOSIS — Z12.4 CERVICAL CANCER SCREENING: ICD-10-CM

## 2025-03-13 DIAGNOSIS — R79.89 ELEVATED SERUM CREATININE: ICD-10-CM

## 2025-03-13 DIAGNOSIS — E55.9 VITAMIN D DEFICIENCY: ICD-10-CM

## 2025-03-13 DIAGNOSIS — D25.9 UTERINE LEIOMYOMA, UNSPECIFIED LOCATION: ICD-10-CM
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
## 2025-03-13 DIAGNOSIS — Z30.011 ENCOUNTER FOR INITIAL PRESCRIPTION OF CONTRACEPTIVE PILLS: ICD-10-CM

## 2025-03-13 DIAGNOSIS — D50.0 IRON DEFICIENCY ANEMIA DUE TO CHRONIC BLOOD LOSS: Primary | ICD-10-CM

## 2025-03-13 RX ORDER — DESOGESTREL AND ETHINYL ESTRADIOL 0.15-0.03
1 KIT ORAL DAILY
Qty: 30 TABLET | Refills: 11 | Status: SHIPPED | OUTPATIENT
Start: 2025-03-13

## 2025-03-13 RX ORDER — CHOLECALCIFEROL (VITAMIN D3) 50 MCG
1 TABLET ORAL DAILY
Qty: 30 TABLET | Refills: 2 | Status: SHIPPED | OUTPATIENT
Start: 2025-03-13

## 2025-03-13 ASSESSMENT — PAIN SCALES - GENERAL: PAINLEVEL_OUTOF10: SEVERE PAIN (7)
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
## 2025-03-13 NOTE — LETTER
3/13/2025    Julianna Dickey   1975        To Whom it May Concern;    Julianna Dickey can return to work starting 3/17/2025 with the following restrictions.    -She needs to avoid carrying heavy weights more than 10 pounds  -She needs to avoid frequent bending, twisting and pushing heavy carts.    All above restrictions for the next 1 month till 2025.    Sincerely,  Brittany Tyler MD

## 2025-03-15 ENCOUNTER — THERAPY VISIT (OUTPATIENT)
Dept: PHYSICAL THERAPY | Facility: CLINIC | Age: 50
End: 2025-03-15
Attending: FAMILY MEDICINE
Payer: COMMERCIAL

## 2025-03-15 DIAGNOSIS — M54.42 CHRONIC MIDLINE LOW BACK PAIN WITH LEFT-SIDED SCIATICA: ICD-10-CM

## 2025-03-15 DIAGNOSIS — M54.50 BILATERAL LOW BACK PAIN WITHOUT SCIATICA, UNSPECIFIED CHRONICITY: Primary | ICD-10-CM

## 2025-03-15 DIAGNOSIS — G89.29 CHRONIC MIDLINE LOW BACK PAIN WITH LEFT-SIDED SCIATICA: ICD-10-CM
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
## 2025-03-15 DIAGNOSIS — M54.6 ACUTE BILATERAL THORACIC BACK PAIN: ICD-10-CM

## 2025-03-15 PROCEDURE — 97110 THERAPEUTIC EXERCISES: CPT | Mod: GP

## 2025-03-15 PROCEDURE — 97140 MANUAL THERAPY 1/> REGIONS: CPT | Mod: GP

## 2025-05-13 ENCOUNTER — OFFICE VISIT (OUTPATIENT)
Dept: FAMILY MEDICINE | Facility: CLINIC | Age: 50
End: 2025-05-13
Attending: FAMILY MEDICINE

## 2025-05-13 VITALS
SYSTOLIC BLOOD PRESSURE: 165 MMHG | WEIGHT: 177.6 LBS | HEART RATE: 72 BPM | BODY MASS INDEX: 28.54 KG/M2 | HEIGHT: 66 IN | DIASTOLIC BLOOD PRESSURE: 87 MMHG | RESPIRATION RATE: 16 BRPM | OXYGEN SATURATION: 100 % | TEMPERATURE: 97.3 F

## 2025-05-13 DIAGNOSIS — D57.3 SICKLE CELL TRAIT: ICD-10-CM

## 2025-05-13 DIAGNOSIS — E55.9 VITAMIN D DEFICIENCY: ICD-10-CM

## 2025-05-13 DIAGNOSIS — L91.8 SKIN TAG: ICD-10-CM

## 2025-05-13 DIAGNOSIS — Z00.00 ROUTINE GENERAL MEDICAL EXAMINATION AT A HEALTH CARE FACILITY: Primary | ICD-10-CM

## 2025-05-13 DIAGNOSIS — Z12.11 SCREEN FOR COLON CANCER: ICD-10-CM

## 2025-05-13 DIAGNOSIS — Z12.4 CERVICAL CANCER SCREENING: ICD-10-CM

## 2025-05-13 DIAGNOSIS — D50.0 IRON DEFICIENCY ANEMIA DUE TO CHRONIC BLOOD LOSS: ICD-10-CM

## 2025-05-13 DIAGNOSIS — R03.0 ELEVATED BP WITHOUT DIAGNOSIS OF HYPERTENSION: ICD-10-CM

## 2025-05-13 DIAGNOSIS — N18.31 STAGE 3A CHRONIC KIDNEY DISEASE (H): ICD-10-CM

## 2025-05-13 LAB
ERYTHROCYTE [DISTWIDTH] IN BLOOD BY AUTOMATED COUNT: 24.4 % (ref 10–15)
FOLATE SERPL-MCNC: 12.1 NG/ML (ref 4.6–34.8)
HCT VFR BLD AUTO: 36.5 % (ref 35–47)
HGB BLD-MCNC: 12 G/DL (ref 11.7–15.7)
MCH RBC QN AUTO: 22.3 PG (ref 26.5–33)
MCHC RBC AUTO-ENTMCNC: 32.9 G/DL (ref 31.5–36.5)
MCV RBC AUTO: 68 FL (ref 78–100)
PLATELET # BLD AUTO: 305 10E3/UL (ref 150–450)
RBC # BLD AUTO: 5.37 10E6/UL (ref 3.8–5.2)
WBC # BLD AUTO: 4.2 10E3/UL (ref 4–11)

## 2025-05-13 PROCEDURE — 36415 COLL VENOUS BLD VENIPUNCTURE: CPT | Performed by: FAMILY MEDICINE

## 2025-05-13 PROCEDURE — 83540 ASSAY OF IRON: CPT | Performed by: FAMILY MEDICINE

## 2025-05-13 PROCEDURE — 82728 ASSAY OF FERRITIN: CPT | Performed by: FAMILY MEDICINE

## 2025-05-13 PROCEDURE — 80048 BASIC METABOLIC PNL TOTAL CA: CPT | Performed by: FAMILY MEDICINE

## 2025-05-13 PROCEDURE — 99396 PREV VISIT EST AGE 40-64: CPT | Performed by: FAMILY MEDICINE

## 2025-05-13 PROCEDURE — 83550 IRON BINDING TEST: CPT | Performed by: FAMILY MEDICINE

## 2025-05-13 PROCEDURE — 87624 HPV HI-RISK TYP POOLED RSLT: CPT | Performed by: FAMILY MEDICINE

## 2025-05-13 PROCEDURE — 85027 COMPLETE CBC AUTOMATED: CPT | Performed by: FAMILY MEDICINE

## 2025-05-13 PROCEDURE — 82306 VITAMIN D 25 HYDROXY: CPT | Performed by: FAMILY MEDICINE

## 2025-05-13 PROCEDURE — 82746 ASSAY OF FOLIC ACID SERUM: CPT | Performed by: FAMILY MEDICINE

## 2025-05-13 SDOH — HEALTH STABILITY: PHYSICAL HEALTH: ON AVERAGE, HOW MANY MINUTES DO YOU ENGAGE IN EXERCISE AT THIS LEVEL?: 20 MIN

## 2025-05-13 SDOH — HEALTH STABILITY: PHYSICAL HEALTH: ON AVERAGE, HOW MANY DAYS PER WEEK DO YOU ENGAGE IN MODERATE TO STRENUOUS EXERCISE (LIKE A BRISK WALK)?: 5 DAYS

## 2025-05-13 ASSESSMENT — SOCIAL DETERMINANTS OF HEALTH (SDOH): HOW OFTEN DO YOU GET TOGETHER WITH FRIENDS OR RELATIVES?: TWICE A WEEK

## 2025-05-13 NOTE — PROGRESS NOTES
"Preventive Care Visit  [facility]  Brittany Tyler MD, Family Medicine  May 13, 2025      Assessment & Plan     Routine general medical examination at a health care facility      Elevated BP without diagnosis of hypertension  - Blood pressure has been normal in visits before.  - Recommended to check home blood pressure readings and send me through [employer].  If consistent elevated more than 140/90 she will need help with medications.      Skin tag  -Big skin tag noted in right axillary region.  - Recommended to schedule procedure visit to get it removed.    Iron deficiency anemia due to chronic blood loss  -History of sickle cell trait, heavy menstrual bleed due to fibroids.  -Ultrasound pelvis yet to be done.    - CBC with platelets  - **Ferritin FUTURE 2mo  - Iron and iron binding capacity  - Basic metabolic panel  (Ca, Cl, CO2, Creat, Gluc, K, Na, BUN)    Sickle cell trait    - Folate; Future  - Folate    Cervical cancer screening  -Pap done today.  - HPV and Gynecologic Cytology Panel - Recommended Age 30 - 65 Years    Vitamin D deficiency  -On 2000 IU vitamin D supplements.  - Vitamin D Deficiency    Stage 3a chronic kidney disease (H)  -Aware to avoid NSAIDs.    Screen for colon cancer    - Colonoscopy Screening  Referral; Future        Patient has been advised of split billing requirements and indicates understanding: Yes        BMI  Estimated body mass index is 28.68 kg/m  as calculated from the following:    Height as of this encounter: 1.676 m (5' 5.98\").    Weight as of this encounter: 80.6 kg (177 lb 9.6 oz).       Counseling  Appropriate preventive services were addressed with this patient via screening, questionnaire, or discussion as appropriate for fall prevention, nutrition, physical activity, Tobacco-use cessation, social engagement, weight loss and cognition.  Checklist reviewing preventive services available has been given to the " patient.  Reviewed patient's diet, addressing concerns and/or questions.   The patient was instructed to see the dentist every 6 months.       Follow-up    Follow-up Visit   Expected date:  May 27, 2025 (Approximate)      Follow Up Appointment Details:     Follow-up with whom?: Me    Follow-Up for what?: Other (Office Visit)    Additional Details: skin tag removal    How?: In Person             Follow-up Visit   Expected date:  Nov 13, 2025 (Approximate)      Follow Up Appointment Details:     Follow-up with whom?: Me    Follow-Up for what?: Chronic Disease f/u    Chronic Disease f/u: General (Other)    How?: In Person                 Subjective   Julianna is a 49 year old, presenting for the following:  Physical        5/13/2025     2:36 PM   Additional Questions   Roomed by Frank   Accompanied by self         5/13/2025     2:36 PM   Patient Reported Additional Medications   Patient reports taking the following new medications no          HPI           Advance Care Planning            5/13/2025   General Health   How would you rate your overall physical health? Good   Feel stress (tense, anxious, or unable to sleep) Not at all         5/13/2025   Nutrition   Three or more servings of calcium each day? Yes   Diet: Low salt   How many servings of fruit and vegetables per day? (!) 2-3   How many sweetened beverages each day? 0-1         5/13/2025   Exercise   Days per week of moderate/strenous exercise 5 days   Average minutes spent exercising at this level 20 min         5/13/2025   Social Factors   Frequency of gathering with friends or relatives Twice a week   Worry food won't last until get money to buy more Yes   Food not last or not have enough money for food? Yes   Do you have housing? (Housing is defined as stable permanent housing and does not include staying outside in a car, in a tent, in an abandoned building, in an overnight shelter, or couch-surfing.) Yes   Are you worried about losing your housing? Patient  declined   Lack of transportation? No   Unable to get utilities (heat,electricity)? No   (!) FOOD SECURITY CONCERN PRESENT      5/13/2025   Dental   Dentist two times every year? (!) NO               5/13/2025   Substance Use   Alcohol more than 3/day or more than 7/wk No   Do you use any other substances recreationally? No     Social History     Tobacco Use    Smoking status: Never    Smokeless tobacco: Never   Vaping Use    Vaping status: Never Used   Substance Use Topics    Alcohol use: Never     Comment: social, occasionally    Drug use: No           3/7/2024   LAST FHS-7 RESULTS   1st degree relative breast or ovarian cancer No   Any relative bilateral breast cancer No   Any male have breast cancer No   Any ONE woman have BOTH breast AND ovarian cancer No   Any woman with breast cancer before 50yrs No   2 or more relatives with breast AND/OR ovarian cancer No   2 or more relatives with breast AND/OR bowel cancer No                5/13/2025   STI Screening   New sexual partner(s) since last STI/HIV test? No     History of abnormal Pap smear:         10/30/2015    12:00 AM   PAP / HPV   PAP-ABSTRACT See Scanned Document      ASCVD Risk   The 10-year ASCVD risk score (Muriel ROMAN, et al., 2019) is: 5.7%    Values used to calculate the score:      Age: 49 years      Sex: Female      Is Non- : Yes      Diabetic: No      Tobacco smoker: No      Systolic Blood Pressure: 165 mmHg      Is BP treated: No      HDL Cholesterol: 45 mg/dL      Total Cholesterol: 200 mg/dL        5/13/2025   Contraception/Family Planning   Questions about contraception or family planning No        Reviewed and updated as needed this visit by Provider                          Review of Systems  Constitutional, HEENT, cardiovascular, pulmonary, gi and gu systems are negative, except as otherwise noted.     Objective    Exam  BP (!) 165/87 (BP Location: Left arm, Patient Position: Sitting, Cuff Size: Adult Regular)  "  Pulse 72   Temp 97.3  F (36.3  C) (Temporal)   Resp 16   Ht 1.676 m (5' 5.98\")   Wt 80.6 kg (177 lb 9.6 oz)   LMP 04/12/2025 (Approximate)   SpO2 100%   BMI 28.68 kg/m     Estimated body mass index is 28.68 kg/m  as calculated from the following:    Height as of this encounter: 1.676 m (5' 5.98\").    Weight as of this encounter: 80.6 kg (177 lb 9.6 oz).    Physical Exam  GENERAL: alert and no distress  NECK: no adenopathy, no asymmetry, masses, or scars  RESP: lungs clear to auscultation - no rales, rhonchi or wheezes  CV: regular rate and rhythm, normal S1 S2, no S3 or S4, no murmur, click or rub, no peripheral edema  ABDOMEN: soft, nontender, no hepatosplenomegaly, no masses and bowel sounds normal  MS: no gross musculoskeletal defects noted, no edema        Signed Electronically by: Brittany Tyler MD    "

## 2025-05-14 ENCOUNTER — RESULTS FOLLOW-UP (OUTPATIENT)
Dept: OBGYN | Facility: CLINIC | Age: 50
End: 2025-05-14

## 2025-05-14 DIAGNOSIS — Z12.4 SCREENING FOR MALIGNANT NEOPLASM OF CERVIX: Primary | ICD-10-CM

## 2025-05-14 LAB
ANION GAP SERPL CALCULATED.3IONS-SCNC: 9 MMOL/L (ref 7–15)
BUN SERPL-MCNC: 11.7 MG/DL (ref 6–20)
CALCIUM SERPL-MCNC: 9.5 MG/DL (ref 8.8–10.4)
CHLORIDE SERPL-SCNC: 102 MMOL/L (ref 98–107)
CREAT SERPL-MCNC: 1.22 MG/DL (ref 0.51–0.95)
EGFRCR SERPLBLD CKD-EPI 2021: 55 ML/MIN/1.73M2
FERRITIN SERPL-MCNC: 48 NG/ML (ref 6–175)
GLUCOSE SERPL-MCNC: 82 MG/DL (ref 70–99)
HCO3 SERPL-SCNC: 28 MMOL/L (ref 22–29)
HPV HR 12 DNA CVX QL NAA+PROBE: NEGATIVE
HPV16 DNA CVX QL NAA+PROBE: NEGATIVE
HPV18 DNA CVX QL NAA+PROBE: NEGATIVE
HUMAN PAPILLOMA VIRUS FINAL DIAGNOSIS: NORMAL
IRON BINDING CAPACITY (ROCHE): 294 UG/DL (ref 240–430)
IRON SATN MFR SERPL: 26 % (ref 15–46)
IRON SERPL-MCNC: 76 UG/DL (ref 37–145)
POTASSIUM SERPL-SCNC: 4.4 MMOL/L (ref 3.4–5.3)
SODIUM SERPL-SCNC: 139 MMOL/L (ref 135–145)
VIT D+METAB SERPL-MCNC: 35 NG/ML (ref 20–50)

## 2025-09-02 ENCOUNTER — OFFICE VISIT (OUTPATIENT)
Dept: URGENT CARE | Facility: URGENT CARE | Age: 50
End: 2025-09-02

## 2025-09-02 VITALS
WEIGHT: 176 LBS | DIASTOLIC BLOOD PRESSURE: 100 MMHG | SYSTOLIC BLOOD PRESSURE: 159 MMHG | OXYGEN SATURATION: 100 % | HEART RATE: 64 BPM | TEMPERATURE: 97.3 F | RESPIRATION RATE: 16 BRPM | HEIGHT: 66 IN | BODY MASS INDEX: 28.28 KG/M2

## 2025-09-02 DIAGNOSIS — R42 DIZZINESS: ICD-10-CM

## 2025-09-02 DIAGNOSIS — R03.0 ELEVATED BLOOD PRESSURE READING WITHOUT DIAGNOSIS OF HYPERTENSION: Primary | ICD-10-CM

## 2025-09-02 DIAGNOSIS — M54.2 NECK PAIN: ICD-10-CM

## 2025-09-02 PROCEDURE — 99215 OFFICE O/P EST HI 40 MIN: CPT | Performed by: NURSE PRACTITIONER

## 2025-09-02 ASSESSMENT — PAIN SCALES - GENERAL: PAINLEVEL_OUTOF10: SEVERE PAIN (8)

## 2025-09-03 ENCOUNTER — E-VISIT (OUTPATIENT)
Dept: FAMILY MEDICINE | Facility: CLINIC | Age: 50
End: 2025-09-03

## 2025-09-03 DIAGNOSIS — I10 PRIMARY HYPERTENSION: Primary | ICD-10-CM

## 2025-09-04 RX ORDER — AMLODIPINE BESYLATE 2.5 MG/1
2.5 TABLET ORAL DAILY
Qty: 30 TABLET | Refills: 2 | Status: SHIPPED | OUTPATIENT
Start: 2025-09-04

## (undated) RX ORDER — FENTANYL CITRATE 50 UG/ML
INJECTION, SOLUTION INTRAMUSCULAR; INTRAVENOUS
Status: DISPENSED
Start: 2022-03-10